# Patient Record
Sex: MALE | Race: WHITE | Employment: FULL TIME | URBAN - NONMETROPOLITAN AREA
[De-identification: names, ages, dates, MRNs, and addresses within clinical notes are randomized per-mention and may not be internally consistent; named-entity substitution may affect disease eponyms.]

---

## 2017-03-04 ENCOUNTER — OFFICE VISIT (OUTPATIENT)
Dept: FAMILY MEDICINE CLINIC | Age: 42
End: 2017-03-04
Payer: COMMERCIAL

## 2017-03-04 VITALS
OXYGEN SATURATION: 97 % | SYSTOLIC BLOOD PRESSURE: 118 MMHG | HEART RATE: 75 BPM | BODY MASS INDEX: 35.75 KG/M2 | DIASTOLIC BLOOD PRESSURE: 80 MMHG | WEIGHT: 286 LBS | TEMPERATURE: 98.2 F

## 2017-03-04 DIAGNOSIS — F41.9 ANXIETY: ICD-10-CM

## 2017-03-04 DIAGNOSIS — G47.19 EXCESSIVE DAYTIME SLEEPINESS: Primary | ICD-10-CM

## 2017-03-04 DIAGNOSIS — E55.9 VITAMIN D DEFICIENCY: ICD-10-CM

## 2017-03-04 DIAGNOSIS — I10 ESSENTIAL HYPERTENSION: ICD-10-CM

## 2017-03-04 DIAGNOSIS — E78.2 MIXED HYPERLIPIDEMIA: ICD-10-CM

## 2017-03-04 DIAGNOSIS — K21.9 GASTROESOPHAGEAL REFLUX DISEASE WITHOUT ESOPHAGITIS: ICD-10-CM

## 2017-03-04 DIAGNOSIS — E03.8 OTHER SPECIFIED HYPOTHYROIDISM: ICD-10-CM

## 2017-03-04 DIAGNOSIS — E66.3 OVERWEIGHT: ICD-10-CM

## 2017-03-04 DIAGNOSIS — Z86.39 HISTORY OF HYPOGLYCEMIA: ICD-10-CM

## 2017-03-04 DIAGNOSIS — R06.83 SNORING: ICD-10-CM

## 2017-03-04 PROCEDURE — 99214 OFFICE O/P EST MOD 30 MIN: CPT | Performed by: NURSE PRACTITIONER

## 2017-03-04 PROCEDURE — 82962 GLUCOSE BLOOD TEST: CPT | Performed by: NURSE PRACTITIONER

## 2017-03-04 RX ORDER — PANTOPRAZOLE SODIUM 40 MG/1
40 TABLET, DELAYED RELEASE ORAL
Qty: 30 TABLET | Refills: 5 | Status: SHIPPED | OUTPATIENT
Start: 2017-03-04 | End: 2017-05-08 | Stop reason: SDUPTHER

## 2017-03-04 RX ORDER — FLUOXETINE 10 MG/1
10 CAPSULE ORAL DAILY
Qty: 30 CAPSULE | Refills: 5 | Status: SHIPPED | OUTPATIENT
Start: 2017-03-04 | End: 2017-05-08 | Stop reason: SDUPTHER

## 2017-03-04 RX ORDER — AMLODIPINE BESYLATE 10 MG/1
10 TABLET ORAL DAILY
Qty: 30 TABLET | Refills: 5 | Status: SHIPPED | OUTPATIENT
Start: 2017-03-04 | End: 2017-05-08 | Stop reason: SDUPTHER

## 2017-03-04 RX ORDER — IRBESARTAN 150 MG/1
150 TABLET ORAL DAILY
Qty: 30 TABLET | Refills: 5 | Status: SHIPPED | OUTPATIENT
Start: 2017-03-04 | End: 2017-05-08 | Stop reason: SDUPTHER

## 2017-03-04 RX ORDER — ATORVASTATIN CALCIUM 40 MG/1
40 TABLET, FILM COATED ORAL NIGHTLY
Qty: 30 TABLET | Refills: 5 | Status: SHIPPED | OUTPATIENT
Start: 2017-03-04 | End: 2017-05-08 | Stop reason: SDUPTHER

## 2017-03-04 RX ORDER — LEVOTHYROXINE SODIUM 0.2 MG/1
200 TABLET ORAL DAILY
Qty: 30 TABLET | Refills: 5 | Status: SHIPPED | OUTPATIENT
Start: 2017-03-04 | End: 2017-05-08 | Stop reason: SDUPTHER

## 2017-03-04 ASSESSMENT — ENCOUNTER SYMPTOMS
VOMITING: 0
COUGH: 0
SORE THROAT: 0
NAUSEA: 0
SHORTNESS OF BREATH: 0
ABDOMINAL PAIN: 0

## 2017-05-08 DIAGNOSIS — I10 ESSENTIAL HYPERTENSION: ICD-10-CM

## 2017-05-08 DIAGNOSIS — E78.2 MIXED HYPERLIPIDEMIA: ICD-10-CM

## 2017-05-08 DIAGNOSIS — E03.8 OTHER SPECIFIED HYPOTHYROIDISM: ICD-10-CM

## 2017-05-08 DIAGNOSIS — F41.9 ANXIETY: ICD-10-CM

## 2017-05-08 DIAGNOSIS — K21.9 GASTROESOPHAGEAL REFLUX DISEASE WITHOUT ESOPHAGITIS: ICD-10-CM

## 2017-05-08 RX ORDER — FLUOXETINE 10 MG/1
10 CAPSULE ORAL DAILY
Qty: 90 CAPSULE | Refills: 0 | Status: SHIPPED | OUTPATIENT
Start: 2017-05-08 | End: 2017-07-20 | Stop reason: SDUPTHER

## 2017-05-08 RX ORDER — ATORVASTATIN CALCIUM 40 MG/1
40 TABLET, FILM COATED ORAL NIGHTLY
Qty: 90 TABLET | Refills: 0 | Status: SHIPPED | OUTPATIENT
Start: 2017-05-08 | End: 2017-07-20 | Stop reason: SDUPTHER

## 2017-05-08 RX ORDER — LEVOTHYROXINE SODIUM 0.2 MG/1
200 TABLET ORAL DAILY
Qty: 90 TABLET | Refills: 0 | Status: SHIPPED | OUTPATIENT
Start: 2017-05-08 | End: 2017-07-20 | Stop reason: SDUPTHER

## 2017-05-08 RX ORDER — IRBESARTAN 150 MG/1
150 TABLET ORAL DAILY
Qty: 90 TABLET | Refills: 0 | Status: SHIPPED | OUTPATIENT
Start: 2017-05-08 | End: 2017-06-04

## 2017-05-08 RX ORDER — PANTOPRAZOLE SODIUM 40 MG/1
40 TABLET, DELAYED RELEASE ORAL
Qty: 90 TABLET | Refills: 0 | Status: SHIPPED | OUTPATIENT
Start: 2017-05-08 | End: 2017-06-09 | Stop reason: SDUPTHER

## 2017-05-08 RX ORDER — AMLODIPINE BESYLATE 10 MG/1
10 TABLET ORAL DAILY
Qty: 90 TABLET | Refills: 0 | Status: SHIPPED | OUTPATIENT
Start: 2017-05-08 | End: 2017-07-20 | Stop reason: SDUPTHER

## 2017-06-04 ENCOUNTER — APPOINTMENT (OUTPATIENT)
Dept: GENERAL RADIOLOGY | Age: 42
End: 2017-06-04
Payer: COMMERCIAL

## 2017-06-04 ENCOUNTER — HOSPITAL ENCOUNTER (EMERGENCY)
Age: 42
Discharge: HOME | End: 2017-06-04
Attending: EMERGENCY MEDICINE
Payer: COMMERCIAL

## 2017-06-04 VITALS
DIASTOLIC BLOOD PRESSURE: 78 MMHG | WEIGHT: 280 LBS | HEIGHT: 75 IN | HEART RATE: 74 BPM | TEMPERATURE: 98.6 F | SYSTOLIC BLOOD PRESSURE: 124 MMHG | BODY MASS INDEX: 34.82 KG/M2 | OXYGEN SATURATION: 98 % | RESPIRATION RATE: 18 BRPM

## 2017-06-04 DIAGNOSIS — R07.9 CHEST PAIN, UNSPECIFIED TYPE: Primary | ICD-10-CM

## 2017-06-04 LAB
ALBUMIN SERPL-MCNC: 4.6 G/DL (ref 3.5–5.2)
ALP BLD-CCNC: 81 U/L (ref 40–130)
ALT SERPL-CCNC: 51 U/L (ref 5–41)
ANION GAP SERPL CALCULATED.3IONS-SCNC: 12 MMOL/L (ref 7–19)
AST SERPL-CCNC: 23 U/L (ref 5–40)
BASOPHILS ABSOLUTE: 0.1 K/UL (ref 0–0.2)
BASOPHILS RELATIVE PERCENT: 1.2 % (ref 0–1)
BILIRUB SERPL-MCNC: 0.8 MG/DL (ref 0.2–1.2)
BUN BLDV-MCNC: 9 MG/DL (ref 6–20)
CALCIUM SERPL-MCNC: 9.3 MG/DL (ref 8.6–10)
CHLORIDE BLD-SCNC: 101 MMOL/L (ref 98–111)
CO2: 25 MMOL/L (ref 22–29)
CREAT SERPL-MCNC: 0.8 MG/DL (ref 0.5–1.2)
EOSINOPHILS ABSOLUTE: 0.3 K/UL (ref 0–0.6)
EOSINOPHILS RELATIVE PERCENT: 3.1 % (ref 0–5)
GFR NON-AFRICAN AMERICAN: >60
GLUCOSE BLD-MCNC: 101 MG/DL (ref 74–109)
HCT VFR BLD CALC: 45.5 % (ref 42–52)
HEMOGLOBIN: 15.6 G/DL (ref 14–18)
INR BLD: 0.93 (ref 0.88–1.18)
LIPASE: 19 U/L (ref 13–60)
LYMPHOCYTES ABSOLUTE: 2.7 K/UL (ref 1.1–4.5)
LYMPHOCYTES RELATIVE PERCENT: 32.1 % (ref 20–40)
MCH RBC QN AUTO: 30.8 PG (ref 27–31)
MCHC RBC AUTO-ENTMCNC: 34.3 G/DL (ref 33–37)
MCV RBC AUTO: 89.7 FL (ref 80–94)
MONOCYTES ABSOLUTE: 1.2 K/UL (ref 0–0.9)
MONOCYTES RELATIVE PERCENT: 14.3 % (ref 0–10)
NEUTROPHILS ABSOLUTE: 4 K/UL (ref 1.5–7.5)
NEUTROPHILS RELATIVE PERCENT: 48.9 % (ref 50–65)
PDW BLD-RTO: 12.9 % (ref 11.5–14.5)
PERFORMED ON: NORMAL
PERFORMED ON: NORMAL
PLATELET # BLD: 357 K/UL (ref 130–400)
PMV BLD AUTO: 9.4 FL (ref 9.4–12.4)
POC TROPONIN I: 0 NG/ML (ref 0–0.08)
POC TROPONIN I: 0 NG/ML (ref 0–0.08)
POTASSIUM SERPL-SCNC: 4 MMOL/L (ref 3.5–5)
PRO-BNP: 50 PG/ML (ref 0–450)
PROTHROMBIN TIME: 12.4 SEC (ref 12–14.6)
RBC # BLD: 5.07 M/UL (ref 4.7–6.1)
SODIUM BLD-SCNC: 138 MMOL/L (ref 136–145)
TOTAL PROTEIN: 7.5 G/DL (ref 6.6–8.7)
WBC # BLD: 8.3 K/UL (ref 4.8–10.8)

## 2017-06-04 PROCEDURE — 99285 EMERGENCY DEPT VISIT HI MDM: CPT

## 2017-06-04 PROCEDURE — 6370000000 HC RX 637 (ALT 250 FOR IP): Performed by: EMERGENCY MEDICINE

## 2017-06-04 PROCEDURE — 99283 EMERGENCY DEPT VISIT LOW MDM: CPT | Performed by: EMERGENCY MEDICINE

## 2017-06-04 PROCEDURE — 85610 PROTHROMBIN TIME: CPT

## 2017-06-04 PROCEDURE — 84484 ASSAY OF TROPONIN QUANT: CPT

## 2017-06-04 PROCEDURE — 80053 COMPREHEN METABOLIC PANEL: CPT

## 2017-06-04 PROCEDURE — 83690 ASSAY OF LIPASE: CPT

## 2017-06-04 PROCEDURE — 71020 XR CHEST STANDARD TWO VW: CPT

## 2017-06-04 PROCEDURE — 85025 COMPLETE CBC W/AUTO DIFF WBC: CPT

## 2017-06-04 PROCEDURE — 93005 ELECTROCARDIOGRAM TRACING: CPT

## 2017-06-04 PROCEDURE — 83880 ASSAY OF NATRIURETIC PEPTIDE: CPT

## 2017-06-04 PROCEDURE — 36415 COLL VENOUS BLD VENIPUNCTURE: CPT

## 2017-06-04 RX ORDER — LISINOPRIL 10 MG/1
10 TABLET ORAL DAILY
COMMUNITY
End: 2017-11-28 | Stop reason: ALTCHOICE

## 2017-06-04 RX ADMIN — ASPIRIN 325 MG: 325 TABLET, DELAYED RELEASE ORAL at 12:25

## 2017-06-04 ASSESSMENT — PAIN DESCRIPTION - LOCATION: LOCATION: CHEST

## 2017-06-04 ASSESSMENT — ENCOUNTER SYMPTOMS
ABDOMINAL PAIN: 0
NAUSEA: 1
BACK PAIN: 0
SHORTNESS OF BREATH: 0
VOMITING: 0

## 2017-06-04 ASSESSMENT — PAIN DESCRIPTION - DESCRIPTORS: DESCRIPTORS: PRESSURE

## 2017-06-04 ASSESSMENT — PAIN SCALES - GENERAL: PAINLEVEL_OUTOF10: 5

## 2017-06-04 ASSESSMENT — PAIN DESCRIPTION - FREQUENCY: FREQUENCY: INTERMITTENT

## 2017-06-04 ASSESSMENT — PAIN DESCRIPTION - PAIN TYPE: TYPE: ACUTE PAIN

## 2017-06-05 ENCOUNTER — TELEPHONE (OUTPATIENT)
Dept: FAMILY MEDICINE CLINIC | Age: 42
End: 2017-06-05

## 2017-06-06 LAB
EKG P AXIS: 15 DEGREES
EKG P AXIS: 50 DEGREES
EKG P-R INTERVAL: 124 MS
EKG P-R INTERVAL: 144 MS
EKG Q-T INTERVAL: 356 MS
EKG Q-T INTERVAL: 396 MS
EKG QRS DURATION: 108 MS
EKG QRS DURATION: 110 MS
EKG QTC CALCULATION (BAZETT): 391 MS
EKG QTC CALCULATION (BAZETT): 403 MS
EKG T AXIS: 10 DEGREES
EKG T AXIS: 16 DEGREES

## 2017-06-09 ENCOUNTER — OFFICE VISIT (OUTPATIENT)
Dept: FAMILY MEDICINE CLINIC | Age: 42
End: 2017-06-09
Payer: COMMERCIAL

## 2017-06-09 VITALS
TEMPERATURE: 99 F | WEIGHT: 288 LBS | DIASTOLIC BLOOD PRESSURE: 68 MMHG | BODY MASS INDEX: 36 KG/M2 | SYSTOLIC BLOOD PRESSURE: 110 MMHG | OXYGEN SATURATION: 98 % | HEART RATE: 81 BPM | RESPIRATION RATE: 16 BRPM

## 2017-06-09 DIAGNOSIS — R07.9 CHEST PAIN, UNSPECIFIED TYPE: Primary | ICD-10-CM

## 2017-06-09 DIAGNOSIS — K21.9 GASTROESOPHAGEAL REFLUX DISEASE WITHOUT ESOPHAGITIS: ICD-10-CM

## 2017-06-09 PROCEDURE — 99214 OFFICE O/P EST MOD 30 MIN: CPT | Performed by: NURSE PRACTITIONER

## 2017-06-09 RX ORDER — PANTOPRAZOLE SODIUM 40 MG/1
TABLET, DELAYED RELEASE ORAL
Qty: 180 TABLET | Refills: 0
Start: 2017-06-09 | End: 2018-09-21

## 2017-06-09 ASSESSMENT — ENCOUNTER SYMPTOMS
SHORTNESS OF BREATH: 0
COUGH: 0

## 2017-06-14 ENCOUNTER — HOSPITAL ENCOUNTER (OUTPATIENT)
Dept: NON INVASIVE DIAGNOSTICS | Age: 42
Discharge: HOME | End: 2017-06-14
Payer: COMMERCIAL

## 2017-06-20 ENCOUNTER — HOSPITAL ENCOUNTER (OUTPATIENT)
Dept: NON INVASIVE DIAGNOSTICS | Age: 42
Discharge: HOME | End: 2017-06-20
Payer: COMMERCIAL

## 2017-06-20 DIAGNOSIS — R07.9 CHEST PAIN, UNSPECIFIED TYPE: ICD-10-CM

## 2017-06-20 LAB
LV EF: 50 %
LVEF MODALITY: NORMAL

## 2017-06-20 PROCEDURE — 93350 STRESS TTE ONLY: CPT

## 2017-07-21 DIAGNOSIS — E03.8 OTHER SPECIFIED HYPOTHYROIDISM: ICD-10-CM

## 2017-07-21 DIAGNOSIS — I10 ESSENTIAL HYPERTENSION: ICD-10-CM

## 2017-07-21 DIAGNOSIS — K21.9 GASTROESOPHAGEAL REFLUX DISEASE WITHOUT ESOPHAGITIS: ICD-10-CM

## 2017-07-21 DIAGNOSIS — E78.2 MIXED HYPERLIPIDEMIA: ICD-10-CM

## 2017-07-21 DIAGNOSIS — F41.9 ANXIETY: ICD-10-CM

## 2017-07-23 RX ORDER — LEVOTHYROXINE SODIUM 0.2 MG/1
TABLET ORAL
Qty: 90 TABLET | Refills: 0 | Status: SHIPPED | OUTPATIENT
Start: 2017-07-23 | End: 2017-11-28 | Stop reason: SDUPTHER

## 2017-07-23 RX ORDER — ATORVASTATIN CALCIUM 40 MG/1
TABLET, FILM COATED ORAL
Qty: 90 TABLET | Refills: 0 | Status: SHIPPED | OUTPATIENT
Start: 2017-07-23 | End: 2017-11-28 | Stop reason: SDUPTHER

## 2017-07-23 RX ORDER — IRBESARTAN 150 MG/1
TABLET ORAL
Qty: 90 TABLET | Refills: 0 | Status: SHIPPED | OUTPATIENT
Start: 2017-07-23 | End: 2017-11-28 | Stop reason: SDUPTHER

## 2017-07-23 RX ORDER — AMLODIPINE BESYLATE 10 MG/1
TABLET ORAL
Qty: 90 TABLET | Refills: 0 | Status: SHIPPED | OUTPATIENT
Start: 2017-07-23 | End: 2017-11-28 | Stop reason: SDUPTHER

## 2017-07-23 RX ORDER — FLUOXETINE 10 MG/1
CAPSULE ORAL
Qty: 90 CAPSULE | Refills: 0 | Status: SHIPPED | OUTPATIENT
Start: 2017-07-23 | End: 2017-11-28 | Stop reason: SDUPTHER

## 2017-07-23 RX ORDER — PANTOPRAZOLE SODIUM 40 MG/1
TABLET, DELAYED RELEASE ORAL
Qty: 90 TABLET | Refills: 0 | Status: SHIPPED | OUTPATIENT
Start: 2017-07-23 | End: 2017-11-28 | Stop reason: SDUPTHER

## 2017-11-28 DIAGNOSIS — F41.9 ANXIETY: ICD-10-CM

## 2017-11-28 DIAGNOSIS — I10 ESSENTIAL HYPERTENSION: ICD-10-CM

## 2017-11-28 DIAGNOSIS — E03.8 OTHER SPECIFIED HYPOTHYROIDISM: ICD-10-CM

## 2017-11-28 DIAGNOSIS — E78.2 MIXED HYPERLIPIDEMIA: ICD-10-CM

## 2017-11-28 DIAGNOSIS — K21.9 GASTROESOPHAGEAL REFLUX DISEASE WITHOUT ESOPHAGITIS: ICD-10-CM

## 2017-11-28 RX ORDER — AMLODIPINE BESYLATE 10 MG/1
10 TABLET ORAL DAILY
Qty: 30 TABLET | Refills: 0 | Status: SHIPPED | OUTPATIENT
Start: 2017-11-28 | End: 2017-12-27 | Stop reason: SDUPTHER

## 2017-11-28 RX ORDER — IRBESARTAN 150 MG/1
150 TABLET ORAL DAILY
Qty: 30 TABLET | Refills: 0 | Status: SHIPPED | OUTPATIENT
Start: 2017-11-28 | End: 2017-12-27 | Stop reason: SDUPTHER

## 2017-11-28 RX ORDER — ATORVASTATIN CALCIUM 40 MG/1
40 TABLET, FILM COATED ORAL NIGHTLY
Qty: 30 TABLET | Refills: 0 | Status: SHIPPED | OUTPATIENT
Start: 2017-11-28 | End: 2017-12-27 | Stop reason: SDUPTHER

## 2017-11-28 RX ORDER — PANTOPRAZOLE SODIUM 40 MG/1
40 TABLET, DELAYED RELEASE ORAL DAILY
Qty: 30 TABLET | Refills: 0 | Status: SHIPPED | OUTPATIENT
Start: 2017-11-28 | End: 2017-12-27 | Stop reason: SDUPTHER

## 2017-11-28 RX ORDER — LEVOTHYROXINE SODIUM 0.2 MG/1
200 TABLET ORAL DAILY
Qty: 30 TABLET | Refills: 0 | Status: SHIPPED | OUTPATIENT
Start: 2017-11-28 | End: 2017-12-27 | Stop reason: SDUPTHER

## 2017-11-28 RX ORDER — FLUOXETINE 10 MG/1
10 CAPSULE ORAL DAILY
Qty: 30 CAPSULE | Refills: 0 | Status: SHIPPED | OUTPATIENT
Start: 2017-11-28 | End: 2017-12-27 | Stop reason: SDUPTHER

## 2017-12-12 RX ORDER — ERGOCALCIFEROL (VITAMIN D2) 1250 MCG
50000 CAPSULE ORAL WEEKLY
Qty: 4 CAPSULE | Refills: 1 | Status: SHIPPED | OUTPATIENT
Start: 2017-12-12 | End: 2018-09-21

## 2017-12-27 ENCOUNTER — OFFICE VISIT (OUTPATIENT)
Dept: FAMILY MEDICINE CLINIC | Age: 42
End: 2017-12-27
Payer: COMMERCIAL

## 2017-12-27 VITALS
OXYGEN SATURATION: 97 % | SYSTOLIC BLOOD PRESSURE: 136 MMHG | BODY MASS INDEX: 37.5 KG/M2 | HEART RATE: 76 BPM | RESPIRATION RATE: 18 BRPM | WEIGHT: 300 LBS | TEMPERATURE: 97.8 F | DIASTOLIC BLOOD PRESSURE: 84 MMHG

## 2017-12-27 DIAGNOSIS — E78.2 MIXED HYPERLIPIDEMIA: ICD-10-CM

## 2017-12-27 DIAGNOSIS — K21.9 GASTROESOPHAGEAL REFLUX DISEASE WITHOUT ESOPHAGITIS: Primary | ICD-10-CM

## 2017-12-27 DIAGNOSIS — E03.8 OTHER SPECIFIED HYPOTHYROIDISM: ICD-10-CM

## 2017-12-27 DIAGNOSIS — F41.9 ANXIETY: ICD-10-CM

## 2017-12-27 DIAGNOSIS — E55.9 VITAMIN D DEFICIENCY: ICD-10-CM

## 2017-12-27 DIAGNOSIS — I10 ESSENTIAL HYPERTENSION: ICD-10-CM

## 2017-12-27 PROCEDURE — 99213 OFFICE O/P EST LOW 20 MIN: CPT | Performed by: NURSE PRACTITIONER

## 2017-12-27 RX ORDER — ESOMEPRAZOLE MAGNESIUM 40 MG/1
40 CAPSULE, DELAYED RELEASE ORAL
Qty: 30 CAPSULE | Refills: 2 | Status: SHIPPED | OUTPATIENT
Start: 2017-12-27 | End: 2018-01-08 | Stop reason: SDUPTHER

## 2017-12-27 RX ORDER — AMLODIPINE BESYLATE 10 MG/1
10 TABLET ORAL DAILY
Qty: 90 TABLET | Refills: 1 | Status: SHIPPED | OUTPATIENT
Start: 2017-12-27 | End: 2018-06-24 | Stop reason: SDUPTHER

## 2017-12-27 RX ORDER — IRBESARTAN 150 MG/1
150 TABLET ORAL DAILY
Qty: 90 TABLET | Refills: 1 | Status: SHIPPED | OUTPATIENT
Start: 2017-12-27 | End: 2018-06-24 | Stop reason: SDUPTHER

## 2017-12-27 RX ORDER — FLUOXETINE 10 MG/1
10 CAPSULE ORAL DAILY
Qty: 90 CAPSULE | Refills: 1 | Status: SHIPPED | OUTPATIENT
Start: 2017-12-27 | End: 2018-06-24 | Stop reason: SDUPTHER

## 2017-12-27 RX ORDER — LEVOTHYROXINE SODIUM 0.2 MG/1
200 TABLET ORAL DAILY
Qty: 90 TABLET | Refills: 1 | Status: SHIPPED | OUTPATIENT
Start: 2017-12-27 | End: 2018-06-24 | Stop reason: SDUPTHER

## 2017-12-27 RX ORDER — ATORVASTATIN CALCIUM 40 MG/1
40 TABLET, FILM COATED ORAL NIGHTLY
Qty: 90 TABLET | Refills: 1 | Status: SHIPPED | OUTPATIENT
Start: 2017-12-27 | End: 2018-06-24 | Stop reason: SDUPTHER

## 2017-12-27 RX ORDER — PANTOPRAZOLE SODIUM 40 MG/1
40 TABLET, DELAYED RELEASE ORAL DAILY
Qty: 90 TABLET | Refills: 1 | Status: CANCELLED | OUTPATIENT
Start: 2017-12-27

## 2017-12-27 NOTE — PROGRESS NOTES
Allergies    Objective:   Physical Exam   Constitutional: He is oriented to person, place, and time. He appears well-developed and well-nourished. No distress. overweight   HENT:   Head: Normocephalic and atraumatic. Right Ear: External ear normal.   Left Ear: External ear normal.   Nose: Nose normal.   Mouth/Throat: Oropharynx is clear and moist. No oropharyngeal exudate. Eyes: Conjunctivae and EOM are normal. Pupils are equal, round, and reactive to light. Neck: Normal range of motion. Neck supple. No tracheal deviation present. No thyromegaly present. Cardiovascular: Normal rate, regular rhythm and normal heart sounds. Pulmonary/Chest: Effort normal and breath sounds normal. No respiratory distress. Abdominal: Soft. Bowel sounds are normal. There is no tenderness. Musculoskeletal: He exhibits no edema. Lymphadenopathy:     He has no cervical adenopathy. Neurological: He is alert and oriented to person, place, and time. Skin: Skin is warm and dry. He is not diaphoretic. Psychiatric: He has a normal mood and affect. His behavior is normal.   Nursing note and vitals reviewed. /84 (Site: Right Arm, Position: Sitting, Cuff Size: Small Adult)   Pulse 76   Temp 97.8 °F (36.6 °C) (Temporal)   Resp 18   Wt 300 lb (136.1 kg)   SpO2 97%   BMI 37.50 kg/m²     Assessment:      1. Gastroesophageal reflux disease without esophagitis  External Referral To Gastroenterology   2. Other specified hypothyroidism  levothyroxine (SYNTHROID) 200 MCG tablet   3. Mixed hyperlipidemia  atorvastatin (LIPITOR) 40 MG tablet   4. Anxiety  FLUoxetine (PROZAC) 10 MG capsule   5. Essential hypertension  amLODIPine (NORVASC) 10 MG tablet    irbesartan (AVAPRO) 150 MG tablet   6. Vitamin D deficiency  Vitamin D 25 Hydroxy           Plan:    Lab from early December discussed/addressed. Change of PPI as above.     Referral to GI as discussed and ordered  Plan as above

## 2017-12-30 ASSESSMENT — ENCOUNTER SYMPTOMS
SHORTNESS OF BREATH: 0
COUGH: 0
ABDOMINAL PAIN: 0
TROUBLE SWALLOWING: 0

## 2018-01-05 RX ORDER — LEVOTHYROXINE SODIUM 112 UG/1
112 TABLET ORAL DAILY
COMMUNITY

## 2018-01-05 RX ORDER — PANTOPRAZOLE SODIUM 40 MG/1
40 TABLET, DELAYED RELEASE ORAL DAILY
COMMUNITY

## 2018-01-05 RX ORDER — ATORVASTATIN CALCIUM 10 MG/1
10 TABLET, FILM COATED ORAL DAILY
COMMUNITY

## 2018-01-08 RX ORDER — ESOMEPRAZOLE MAGNESIUM 40 MG/1
40 CAPSULE, DELAYED RELEASE ORAL
Qty: 30 CAPSULE | Refills: 2 | Status: SHIPPED | OUTPATIENT
Start: 2018-01-08 | End: 2021-06-14 | Stop reason: SDUPTHER

## 2018-01-25 ENCOUNTER — OFFICE VISIT (OUTPATIENT)
Dept: GASTROENTEROLOGY | Facility: CLINIC | Age: 43
End: 2018-01-25

## 2018-01-25 VITALS
DIASTOLIC BLOOD PRESSURE: 84 MMHG | TEMPERATURE: 97.1 F | OXYGEN SATURATION: 98 % | HEART RATE: 82 BPM | HEIGHT: 75 IN | WEIGHT: 294 LBS | SYSTOLIC BLOOD PRESSURE: 144 MMHG | BODY MASS INDEX: 36.56 KG/M2

## 2018-01-25 DIAGNOSIS — K21.9 GASTROESOPHAGEAL REFLUX DISEASE, ESOPHAGITIS PRESENCE NOT SPECIFIED: Primary | ICD-10-CM

## 2018-01-25 DIAGNOSIS — R09.89 GLOBUS SENSATION: ICD-10-CM

## 2018-01-25 PROCEDURE — 99214 OFFICE O/P EST MOD 30 MIN: CPT | Performed by: NURSE PRACTITIONER

## 2018-01-25 RX ORDER — IRBESARTAN 150 MG/1
150 TABLET ORAL NIGHTLY
COMMUNITY

## 2018-01-25 RX ORDER — FLUOXETINE 10 MG/1
10 CAPSULE ORAL DAILY
COMMUNITY

## 2018-01-25 RX ORDER — AMLODIPINE BESYLATE 10 MG/1
10 TABLET ORAL DAILY
COMMUNITY

## 2018-01-25 NOTE — PROGRESS NOTES
"Chief Complaint   Patient presents with   • GI Problem     was having acid reflux was changed to nexium doing better had endo 1-2-13       Subjective     HPI     Increased acid reflux despite use of daily Protonix.  Acid reflux worse in afternoon.  No nausea or dysphagia.  No abdominal pain. Protonix changed to Nexium OTC (takes 40 mg) in Dec 2017.  He reports improvement in symptoms with change of PPI over past 2 weeks.   He continues to have frequent clearing of throat due to globus sensation.  No changes in bowels, no BRBPR or melan stools.   Consumes \"alot\" of caffeine daily.    Endoscopy (Dr Horvath) 2013 esophagitis  CScope (Dr Horvath) 2010 for eval of diarrhea, procedure normal      Past Medical History:   Diagnosis Date   • Anxiety    • Hyperlipidemia    • Hypertension        Past Surgical History:   Procedure Laterality Date   • APPENDECTOMY     • CHOLECYSTECTOMY     • COLONOSCOPY  08/06/2010    normal   • ENDOSCOPY  01/02/2013    mild gastritis/esophagitis       Outpatient Prescriptions Marked as Taking for the 1/25/18 encounter (Office Visit) with JOSEPH Gtz   Medication Sig Dispense Refill   • amLODIPine (NORVASC) 10 MG tablet Take 10 mg by mouth Daily.     • atorvastatin (LIPITOR) 10 MG tablet Take 10 mg by mouth Daily.     • esomeprazole (nexIUM) 20 MG capsule Take 20 mg by mouth 2 (Two) Times a Day.     • FLUoxetine (PROzac) 10 MG capsule Take 10 mg by mouth Daily.     • irbesartan (AVAPRO) 150 MG tablet Take 150 mg by mouth Every Night.     • levothyroxine (SYNTHROID, LEVOTHROID) 112 MCG tablet Take 112 mcg by mouth Daily.         No Known Allergies    Social History     Social History   • Marital status:      Spouse name: N/A   • Number of children: N/A   • Years of education: N/A     Occupational History   • Not on file.     Social History Main Topics   • Smoking status: Former Smoker   • Smokeless tobacco: Never Used   • Alcohol use Yes      Comment: rare   • Drug use: No   • " "Sexual activity: Not on file     Other Topics Concern   • Not on file     Social History Narrative       Family History   Problem Relation Age of Onset   • Elizabeth's esophagus Mother        Review of Systems   Constitutional: Negative for fatigue, fever and unexpected weight change.   HENT: Negative for hearing loss, sore throat and voice change.    Eyes: Negative for visual disturbance.   Respiratory: Negative for cough, shortness of breath and wheezing.    Cardiovascular: Negative for chest pain and palpitations.   Gastrointestinal: Negative for abdominal pain, blood in stool and vomiting.   Endocrine: Negative for polydipsia and polyuria.   Genitourinary: Negative for difficulty urinating, dysuria, hematuria and urgency.   Musculoskeletal: Negative for joint swelling and myalgias.   Skin: Negative for color change, rash and wound.   Neurological: Negative for dizziness, tremors, seizures and syncope.   Hematological: Does not bruise/bleed easily.   Psychiatric/Behavioral: Negative for agitation and confusion. The patient is not nervous/anxious.        Objective     Vitals:    01/25/18 0922   BP: 144/84   Pulse: 82   Temp: 97.1 °F (36.2 °C)   SpO2: 98%   Weight: 133 kg (294 lb)   Height: 190.5 cm (75\")     Body mass index is 36.75 kg/(m^2).    Physical Exam   Constitutional: He is oriented to person, place, and time. He appears well-developed and well-nourished.   HENT:   Head: Normocephalic and atraumatic.   Eyes:   Pink, Nonicteric   Neck:   Global Assessment- supple. No JVD or lymphadenopathy   Cardiovascular: Normal rate, regular rhythm and normal heart sounds.  Exam reveals no gallop and no friction rub.    No murmur heard.  Pulmonary/Chest: Effort normal and breath sounds normal. No respiratory distress. He has no wheezes. He has no rales.   Inspection: Movements-Symmetrical   Abdominal: Soft. Bowel sounds are normal. He exhibits no distension and no mass. There is no tenderness. There is no rebound and no " guarding.   Neurological: He is alert and oriented to person, place, and time.   General Exam-Deemed a reliable historian, able to converse without difficulty and Able to move all extremities without difficulty       Imaging Results (most recent)     None          Assessment/Plan     Be was seen today for gi problem.    Diagnoses and all orders for this visit:    Gastroesophageal reflux disease, esophagitis presence not specified  -     Case Request; Standing  -     Implement Anesthesia Orders Day of Procedure; Standing  -     Obtain Informed Consent; Standing  -     Case Request    Globus sensation      ESOPHAGOGASTRODUODENOSCOPY WITH ANESTHESIA (N/A)    Decrease caffeine intake  Cont OTC Nexium 2 in the morning 30 min prior to breakfast    Patient is to continue all blood pressure and cardiac medications prior to procedure.     Patient's BMI is above normal parameters. Follow-up plan includes:  no follow-up required     The risk of the endoscopy were discussed in detail.  We discussed the risk of perforation (one out of 5363-8167, riskier with dilation), bleeding (one out of 500), and the rare risks of infection, adverse reaction to anesthesia, respiratory failure, cardiac failure including MI and adverse reaction to medications, etc.  We discussed consequences that could occur if a risk were to develop such as the need for hospitalization, blood transfusion, surgical intervention, medications, pain and disability and death.  Alternatives include not doing anything, or pursuing an UGI series which only offers a diagnosis with potential less accuracy compared to egd.  The patient verbalizes understanding and agrees to proceed.      .    Patient Instructions   Gastroesophageal Reflux Disease, Adult    Gastroesophageal reflux disease (GERD) happens when acid from your stomach flows up into the esophagus. When acid comes in contact with the esophagus, the acid causes soreness (inflammation) in the esophagus. Over  time, GERD may create small holes (ulcers) in the lining of the esophagus.  CAUSES    · Increased body weight. This puts pressure on the stomach, making acid rise from the stomach into the esophagus.  · Smoking. This increases acid production in the stomach.  · Drinking alcohol. This causes decreased pressure in the lower esophageal sphincter (valve or ring of muscle between the esophagus and stomach), allowing acid from the stomach into the esophagus.  · Late evening meals and a full stomach. This increases pressure and acid production in the stomach.  · A malformed lower esophageal sphincter.  Sometimes, no cause is found.  SYMPTOMS    · Burning pain in the lower part of the mid-chest behind the breastbone and in the mid-stomach area. This may occur twice a week or more often.  · Trouble swallowing.  · Sore throat.  · Dry cough.  · Asthma-like symptoms including chest tightness, shortness of breath, or wheezing.  DIAGNOSIS    Your caregiver may be able to diagnose GERD based on your symptoms. In some cases, X-rays and other tests may be done to check for complications or to check the condition of your stomach and esophagus.  TREATMENT    Your caregiver may recommend over-the-counter or prescription medicines to help decrease acid production. Ask your caregiver before starting or adding any new medicines.    HOME CARE INSTRUCTIONS    · Change the factors that you can control. Ask your caregiver for guidance concerning weight loss, quitting smoking, and alcohol consumption.  · Avoid foods and drinks that make your symptoms worse, such as:  ¨ Caffeine or alcoholic drinks.  ¨ Chocolate.  ¨ Peppermint or mint flavorings.  ¨ Garlic and onions.  ¨ Spicy foods.  ¨ Citrus fruits, such as oranges, constantino, or limes.  ¨ Tomato-based foods such as sauce, chili, salsa, and pizza.  ¨ Fried and fatty foods.  · Avoid lying down for the 3 hours prior to your bedtime or prior to taking a nap.  · Eat small, frequent meals instead of  large meals.  · Wear loose-fitting clothing. Do not wear anything tight around your waist that causes pressure on your stomach.  · Raise the head of your bed 6 to 8 inches with wood blocks to help you sleep. Extra pillows will not help.  · Only take over-the-counter or prescription medicines for pain, discomfort, or fever as directed by your caregiver.  · Do not take aspirin, ibuprofen, or other nonsteroidal anti-inflammatory drugs (NSAIDs).  SEEK IMMEDIATE MEDICAL CARE IF:    · You have pain in your arms, neck, jaw, teeth, or back.  · Your pain increases or changes in intensity or duration.  · You develop nausea, vomiting, or sweating (diaphoresis).  · You develop shortness of breath, or you faint.  · Your vomit is green, yellow, black, or looks like coffee grounds or blood.  · Your stool is red, bloody, or black.  These symptoms could be signs of other problems, such as heart disease, gastric bleeding, or esophageal bleeding.  MAKE SURE YOU:    · Understand these instructions.  · Will watch your condition.  · Will get help right away if you are not doing well or get worse.     This information is not intended to replace advice given to you by your health care provider. Make sure you discuss any questions you have with your health care provider.     Document Released: 09/27/2006 Document Revised: 01/08/2016 Document Reviewed: 04/13/2016  [employer] Interactive Patient Education ©2016 [employer]

## 2018-01-25 NOTE — PATIENT INSTRUCTIONS
Gastroesophageal Reflux Disease, Adult    Gastroesophageal reflux disease (GERD) happens when acid from your stomach flows up into the esophagus. When acid comes in contact with the esophagus, the acid causes soreness (inflammation) in the esophagus. Over time, GERD may create small holes (ulcers) in the lining of the esophagus.  CAUSES    · Increased body weight. This puts pressure on the stomach, making acid rise from the stomach into the esophagus.  · Smoking. This increases acid production in the stomach.  · Drinking alcohol. This causes decreased pressure in the lower esophageal sphincter (valve or ring of muscle between the esophagus and stomach), allowing acid from the stomach into the esophagus.  · Late evening meals and a full stomach. This increases pressure and acid production in the stomach.  · A malformed lower esophageal sphincter.  Sometimes, no cause is found.  SYMPTOMS    · Burning pain in the lower part of the mid-chest behind the breastbone and in the mid-stomach area. This may occur twice a week or more often.  · Trouble swallowing.  · Sore throat.  · Dry cough.  · Asthma-like symptoms including chest tightness, shortness of breath, or wheezing.  DIAGNOSIS    Your caregiver may be able to diagnose GERD based on your symptoms. In some cases, X-rays and other tests may be done to check for complications or to check the condition of your stomach and esophagus.  TREATMENT    Your caregiver may recommend over-the-counter or prescription medicines to help decrease acid production. Ask your caregiver before starting or adding any new medicines.    HOME CARE INSTRUCTIONS    · Change the factors that you can control. Ask your caregiver for guidance concerning weight loss, quitting smoking, and alcohol consumption.  · Avoid foods and drinks that make your symptoms worse, such as:  ¨ Caffeine or alcoholic drinks.  ¨ Chocolate.  ¨ Peppermint or mint flavorings.  ¨ Garlic and onions.  ¨ Spicy foods.  ¨ Citrus  fruits, such as oranges, constantino, or limes.  ¨ Tomato-based foods such as sauce, chili, salsa, and pizza.  ¨ Fried and fatty foods.  · Avoid lying down for the 3 hours prior to your bedtime or prior to taking a nap.  · Eat small, frequent meals instead of large meals.  · Wear loose-fitting clothing. Do not wear anything tight around your waist that causes pressure on your stomach.  · Raise the head of your bed 6 to 8 inches with wood blocks to help you sleep. Extra pillows will not help.  · Only take over-the-counter or prescription medicines for pain, discomfort, or fever as directed by your caregiver.  · Do not take aspirin, ibuprofen, or other nonsteroidal anti-inflammatory drugs (NSAIDs).  SEEK IMMEDIATE MEDICAL CARE IF:    · You have pain in your arms, neck, jaw, teeth, or back.  · Your pain increases or changes in intensity or duration.  · You develop nausea, vomiting, or sweating (diaphoresis).  · You develop shortness of breath, or you faint.  · Your vomit is green, yellow, black, or looks like coffee grounds or blood.  · Your stool is red, bloody, or black.  These symptoms could be signs of other problems, such as heart disease, gastric bleeding, or esophageal bleeding.  MAKE SURE YOU:    · Understand these instructions.  · Will watch your condition.  · Will get help right away if you are not doing well or get worse.     This information is not intended to replace advice given to you by your health care provider. Make sure you discuss any questions you have with your health care provider.     Document Released: 09/27/2006 Document Revised: 01/08/2016 Document Reviewed: 04/13/2016  [employer] Interactive Patient Education ©2016 [employer]

## 2018-02-13 ENCOUNTER — ANESTHESIA EVENT (OUTPATIENT)
Dept: GASTROENTEROLOGY | Facility: HOSPITAL | Age: 43
End: 2018-02-13

## 2018-02-13 ENCOUNTER — ANESTHESIA (OUTPATIENT)
Dept: GASTROENTEROLOGY | Facility: HOSPITAL | Age: 43
End: 2018-02-13

## 2018-02-13 ENCOUNTER — HOSPITAL ENCOUNTER (OUTPATIENT)
Facility: HOSPITAL | Age: 43
Setting detail: HOSPITAL OUTPATIENT SURGERY
Discharge: HOME | End: 2018-02-13
Attending: INTERNAL MEDICINE | Admitting: INTERNAL MEDICINE

## 2018-02-13 VITALS
HEART RATE: 69 BPM | SYSTOLIC BLOOD PRESSURE: 130 MMHG | BODY MASS INDEX: 36.06 KG/M2 | HEIGHT: 75 IN | DIASTOLIC BLOOD PRESSURE: 77 MMHG | OXYGEN SATURATION: 98 % | TEMPERATURE: 98.1 F | RESPIRATION RATE: 15 BRPM | WEIGHT: 290 LBS

## 2018-02-13 DIAGNOSIS — K21.9 GASTROESOPHAGEAL REFLUX DISEASE, ESOPHAGITIS PRESENCE NOT SPECIFIED: ICD-10-CM

## 2018-02-13 PROCEDURE — 43239 EGD BIOPSY SINGLE/MULTIPLE: CPT | Performed by: INTERNAL MEDICINE

## 2018-02-13 PROCEDURE — 25010000002 PROPOFOL 10 MG/ML EMULSION: Performed by: NURSE ANESTHETIST, CERTIFIED REGISTERED

## 2018-02-13 PROCEDURE — 87081 CULTURE SCREEN ONLY: CPT | Performed by: INTERNAL MEDICINE

## 2018-02-13 RX ORDER — SODIUM CHLORIDE 0.9 % (FLUSH) 0.9 %
3 SYRINGE (ML) INJECTION AS NEEDED
Status: DISCONTINUED | OUTPATIENT
Start: 2018-02-13 | End: 2018-02-13 | Stop reason: HOSPADM

## 2018-02-13 RX ORDER — PROPOFOL 10 MG/ML
VIAL (ML) INTRAVENOUS AS NEEDED
Status: DISCONTINUED | OUTPATIENT
Start: 2018-02-13 | End: 2018-02-13 | Stop reason: SURG

## 2018-02-13 RX ORDER — SODIUM CHLORIDE 9 MG/ML
500 INJECTION, SOLUTION INTRAVENOUS CONTINUOUS PRN
Status: DISCONTINUED | OUTPATIENT
Start: 2018-02-13 | End: 2018-02-13 | Stop reason: HOSPADM

## 2018-02-13 RX ORDER — LIDOCAINE HYDROCHLORIDE 20 MG/ML
INJECTION, SOLUTION INFILTRATION; PERINEURAL AS NEEDED
Status: DISCONTINUED | OUTPATIENT
Start: 2018-02-13 | End: 2018-02-13 | Stop reason: SURG

## 2018-02-13 RX ADMIN — PROPOFOL 120 MG: 10 INJECTION, EMULSION INTRAVENOUS at 10:07

## 2018-02-13 RX ADMIN — SODIUM CHLORIDE 500 ML: 9 INJECTION, SOLUTION INTRAVENOUS at 08:42

## 2018-02-13 RX ADMIN — LIDOCAINE HYDROCHLORIDE 0.5 ML: 10 INJECTION, SOLUTION EPIDURAL; INFILTRATION; INTRACAUDAL; PERINEURAL at 08:42

## 2018-02-13 RX ADMIN — LIDOCAINE HYDROCHLORIDE 200 MG: 20 INJECTION, SOLUTION INFILTRATION; PERINEURAL at 10:07

## 2018-02-13 NOTE — ANESTHESIA PREPROCEDURE EVALUATION
Anesthesia Evaluation     Patient summary reviewed   no history of anesthetic complications:  NPO Solid Status: > 8 hours             Airway   Mallampati: II  TM distance: >3 FB  Neck ROM: full  Dental      Pulmonary    (+) sleep apnea,   Cardiovascular   Exercise tolerance: good (4-7 METS)    (+) hypertension, hyperlipidemia      Neuro/Psych- negative ROS  GI/Hepatic/Renal/Endo    (+) obesity,  hiatal hernia, GERD, hypothyroidism,     Musculoskeletal     Abdominal    Substance History      OB/GYN          Other                        Anesthesia Plan    ASA 2     general     intravenous induction   Anesthetic plan and risks discussed with patient.

## 2018-02-13 NOTE — PLAN OF CARE
Problem: Patient Care Overview (Adult)  Goal: Plan of Care Review  Outcome: Ongoing (interventions implemented as appropriate)   02/13/18 1009   Patient Care Overview   Progress improving   Outcome Evaluation   Outcome Summary/Follow up Plan pt tolerating proccedure well        Problem: GI Endoscopy (Adult)  Goal: Signs and Symptoms of Listed Potential Problems Will be Absent or Manageable (GI Endoscopy)  Outcome: Ongoing (interventions implemented as appropriate)   02/13/18 1009   GI Endoscopy   Problems Assessed (GI Endoscopy) all   Problems Present (GI Endoscopy) none

## 2018-02-13 NOTE — ANESTHESIA POSTPROCEDURE EVALUATION
Patient: Be Rutherford    Procedure Summary     Date Anesthesia Start Anesthesia Stop Room / Location    02/13/18 1003 1015  [facility] /  [facility]       Procedure Diagnosis Surgeon Provider    ESOPHAGOGASTRODUODENOSCOPY WITH ANESTHESIA (N/A Esophagus) Gastroesophageal reflux disease, esophagitis presence not specified  (Gastroesophageal reflux disease, esophagitis presence not specified [K21.9]) DO Fabian Mcknezie CRNA          Anesthesia Type: general  Last vitals  BP   143/78 (02/13/18 0831)   Temp   98.1 °F (36.7 °C) (02/13/18 0831)   Pulse   88 (02/13/18 0831)   Resp   20 (02/13/18 0831)     SpO2   98 % (02/13/18 0831)     Post Anesthesia Care and Evaluation    Patient location during evaluation: PHASE II  Patient participation: complete - patient participated  Level of consciousness: awake and alert  Pain score: 0  Pain management: adequate  Airway patency: patent  Anesthetic complications: No anesthetic complications    Cardiovascular status: acceptable  Respiratory status: acceptable  Hydration status: acceptable

## 2018-02-13 NOTE — H&P (VIEW-ONLY)
"Chief Complaint   Patient presents with   • GI Problem     was having acid reflux was changed to nexium doing better had endo 1-2-13       Subjective     HPI     Increased acid reflux despite use of daily Protonix.  Acid reflux worse in afternoon.  No nausea or dysphagia.  No abdominal pain. Protonix changed to Nexium OTC (takes 40 mg) in Dec 2017.  He reports improvement in symptoms with change of PPI over past 2 weeks.   He continues to have frequent clearing of throat due to globus sensation.  No changes in bowels, no BRBPR or melan stools.   Consumes \"alot\" of caffeine daily.    Endoscopy (Dr Horvath) 2013 esophagitis  CScope (Dr Horvath) 2010 for eval of diarrhea, procedure normal      Past Medical History:   Diagnosis Date   • Anxiety    • Hyperlipidemia    • Hypertension        Past Surgical History:   Procedure Laterality Date   • APPENDECTOMY     • CHOLECYSTECTOMY     • COLONOSCOPY  08/06/2010    normal   • ENDOSCOPY  01/02/2013    mild gastritis/esophagitis       Outpatient Prescriptions Marked as Taking for the 1/25/18 encounter (Office Visit) with JOSEPH Gtz   Medication Sig Dispense Refill   • amLODIPine (NORVASC) 10 MG tablet Take 10 mg by mouth Daily.     • atorvastatin (LIPITOR) 10 MG tablet Take 10 mg by mouth Daily.     • esomeprazole (nexIUM) 20 MG capsule Take 20 mg by mouth 2 (Two) Times a Day.     • FLUoxetine (PROzac) 10 MG capsule Take 10 mg by mouth Daily.     • irbesartan (AVAPRO) 150 MG tablet Take 150 mg by mouth Every Night.     • levothyroxine (SYNTHROID, LEVOTHROID) 112 MCG tablet Take 112 mcg by mouth Daily.         No Known Allergies    Social History     Social History   • Marital status:      Spouse name: N/A   • Number of children: N/A   • Years of education: N/A     Occupational History   • Not on file.     Social History Main Topics   • Smoking status: Former Smoker   • Smokeless tobacco: Never Used   • Alcohol use Yes      Comment: rare   • Drug use: No   • " "Sexual activity: Not on file     Other Topics Concern   • Not on file     Social History Narrative       Family History   Problem Relation Age of Onset   • Elizabeth's esophagus Mother        Review of Systems   Constitutional: Negative for fatigue, fever and unexpected weight change.   HENT: Negative for hearing loss, sore throat and voice change.    Eyes: Negative for visual disturbance.   Respiratory: Negative for cough, shortness of breath and wheezing.    Cardiovascular: Negative for chest pain and palpitations.   Gastrointestinal: Negative for abdominal pain, blood in stool and vomiting.   Endocrine: Negative for polydipsia and polyuria.   Genitourinary: Negative for difficulty urinating, dysuria, hematuria and urgency.   Musculoskeletal: Negative for joint swelling and myalgias.   Skin: Negative for color change, rash and wound.   Neurological: Negative for dizziness, tremors, seizures and syncope.   Hematological: Does not bruise/bleed easily.   Psychiatric/Behavioral: Negative for agitation and confusion. The patient is not nervous/anxious.        Objective     Vitals:    01/25/18 0922   BP: 144/84   Pulse: 82   Temp: 97.1 °F (36.2 °C)   SpO2: 98%   Weight: 133 kg (294 lb)   Height: 190.5 cm (75\")     Body mass index is 36.75 kg/(m^2).    Physical Exam   Constitutional: He is oriented to person, place, and time. He appears well-developed and well-nourished.   HENT:   Head: Normocephalic and atraumatic.   Eyes:   Pink, Nonicteric   Neck:   Global Assessment- supple. No JVD or lymphadenopathy   Cardiovascular: Normal rate, regular rhythm and normal heart sounds.  Exam reveals no gallop and no friction rub.    No murmur heard.  Pulmonary/Chest: Effort normal and breath sounds normal. No respiratory distress. He has no wheezes. He has no rales.   Inspection: Movements-Symmetrical   Abdominal: Soft. Bowel sounds are normal. He exhibits no distension and no mass. There is no tenderness. There is no rebound and no " guarding.   Neurological: He is alert and oriented to person, place, and time.   General Exam-Deemed a reliable historian, able to converse without difficulty and Able to move all extremities without difficulty       Imaging Results (most recent)     None          Assessment/Plan     Be was seen today for gi problem.    Diagnoses and all orders for this visit:    Gastroesophageal reflux disease, esophagitis presence not specified  -     Case Request; Standing  -     Implement Anesthesia Orders Day of Procedure; Standing  -     Obtain Informed Consent; Standing  -     Case Request    Globus sensation      ESOPHAGOGASTRODUODENOSCOPY WITH ANESTHESIA (N/A)    Decrease caffeine intake  Cont OTC Nexium 2 in the morning 30 min prior to breakfast    Patient is to continue all blood pressure and cardiac medications prior to procedure.     Patient's BMI is above normal parameters. Follow-up plan includes:  no follow-up required     The risk of the endoscopy were discussed in detail.  We discussed the risk of perforation (one out of 8402-0601, riskier with dilation), bleeding (one out of 500), and the rare risks of infection, adverse reaction to anesthesia, respiratory failure, cardiac failure including MI and adverse reaction to medications, etc.  We discussed consequences that could occur if a risk were to develop such as the need for hospitalization, blood transfusion, surgical intervention, medications, pain and disability and death.  Alternatives include not doing anything, or pursuing an UGI series which only offers a diagnosis with potential less accuracy compared to egd.  The patient verbalizes understanding and agrees to proceed.      .    Patient Instructions   Gastroesophageal Reflux Disease, Adult    Gastroesophageal reflux disease (GERD) happens when acid from your stomach flows up into the esophagus. When acid comes in contact with the esophagus, the acid causes soreness (inflammation) in the esophagus. Over  time, GERD may create small holes (ulcers) in the lining of the esophagus.  CAUSES    · Increased body weight. This puts pressure on the stomach, making acid rise from the stomach into the esophagus.  · Smoking. This increases acid production in the stomach.  · Drinking alcohol. This causes decreased pressure in the lower esophageal sphincter (valve or ring of muscle between the esophagus and stomach), allowing acid from the stomach into the esophagus.  · Late evening meals and a full stomach. This increases pressure and acid production in the stomach.  · A malformed lower esophageal sphincter.  Sometimes, no cause is found.  SYMPTOMS    · Burning pain in the lower part of the mid-chest behind the breastbone and in the mid-stomach area. This may occur twice a week or more often.  · Trouble swallowing.  · Sore throat.  · Dry cough.  · Asthma-like symptoms including chest tightness, shortness of breath, or wheezing.  DIAGNOSIS    Your caregiver may be able to diagnose GERD based on your symptoms. In some cases, X-rays and other tests may be done to check for complications or to check the condition of your stomach and esophagus.  TREATMENT    Your caregiver may recommend over-the-counter or prescription medicines to help decrease acid production. Ask your caregiver before starting or adding any new medicines.    HOME CARE INSTRUCTIONS    · Change the factors that you can control. Ask your caregiver for guidance concerning weight loss, quitting smoking, and alcohol consumption.  · Avoid foods and drinks that make your symptoms worse, such as:  ¨ Caffeine or alcoholic drinks.  ¨ Chocolate.  ¨ Peppermint or mint flavorings.  ¨ Garlic and onions.  ¨ Spicy foods.  ¨ Citrus fruits, such as oranges, constantino, or limes.  ¨ Tomato-based foods such as sauce, chili, salsa, and pizza.  ¨ Fried and fatty foods.  · Avoid lying down for the 3 hours prior to your bedtime or prior to taking a nap.  · Eat small, frequent meals instead of  large meals.  · Wear loose-fitting clothing. Do not wear anything tight around your waist that causes pressure on your stomach.  · Raise the head of your bed 6 to 8 inches with wood blocks to help you sleep. Extra pillows will not help.  · Only take over-the-counter or prescription medicines for pain, discomfort, or fever as directed by your caregiver.  · Do not take aspirin, ibuprofen, or other nonsteroidal anti-inflammatory drugs (NSAIDs).  SEEK IMMEDIATE MEDICAL CARE IF:    · You have pain in your arms, neck, jaw, teeth, or back.  · Your pain increases or changes in intensity or duration.  · You develop nausea, vomiting, or sweating (diaphoresis).  · You develop shortness of breath, or you faint.  · Your vomit is green, yellow, black, or looks like coffee grounds or blood.  · Your stool is red, bloody, or black.  These symptoms could be signs of other problems, such as heart disease, gastric bleeding, or esophageal bleeding.  MAKE SURE YOU:    · Understand these instructions.  · Will watch your condition.  · Will get help right away if you are not doing well or get worse.     This information is not intended to replace advice given to you by your health care provider. Make sure you discuss any questions you have with your health care provider.     Document Released: 09/27/2006 Document Revised: 01/08/2016 Document Reviewed: 04/13/2016  [employer] Interactive Patient Education ©2016 [employer]

## 2018-02-13 NOTE — PLAN OF CARE
Problem: Patient Care Overview (Adult)  Goal: Adult Individualization and Mutuality  Outcome: Ongoing (interventions implemented as appropriate)   02/13/18 0830   Individualization   Patient Specific Preferences none   Patient Specific Goals none   Patient Specific Interventions none   Mutuality/Individual Preferences   What Anxieties, Fears or Concerns Do You Have About Your Health or Care? none   What Questions Do You Have About Your Health or Care? none   What Information Would Help Us Give You More Personalized Care? none

## 2018-02-14 LAB — UREASE TISS QL: NEGATIVE

## 2018-09-11 ENCOUNTER — TELEPHONE (OUTPATIENT)
Dept: FAMILY MEDICINE CLINIC | Age: 43
End: 2018-09-11

## 2018-09-11 DIAGNOSIS — E55.9 VITAMIN D DEFICIENCY: ICD-10-CM

## 2018-09-11 DIAGNOSIS — E78.2 MIXED HYPERLIPIDEMIA: Primary | ICD-10-CM

## 2018-09-11 DIAGNOSIS — I10 ESSENTIAL HYPERTENSION: ICD-10-CM

## 2018-09-11 DIAGNOSIS — E03.9 ACQUIRED HYPOTHYROIDISM: ICD-10-CM

## 2018-09-11 NOTE — TELEPHONE ENCOUNTER
[address] [facility] Clinical Staff   Caller: Unspecified (Yesterday,  3:28 PM)             Pt would like to inquire if he will need lab's drawn for his up coming apptmt? If so please add to Epic and advise the pt please. Phone # [phone]   Can leave a message   Thank you!

## 2018-09-11 NOTE — TELEPHONE ENCOUNTER
----- Message from Britton Bello sent at 9/10/2018  3:28 PM CDT -----  Pt would like to inquire if he will need lab's drawn for his up coming apptmt? If so please add to Epic and advise the pt please. Phone # [phone]  Can leave a message  Thank you!

## 2018-09-17 DIAGNOSIS — E78.2 MIXED HYPERLIPIDEMIA: ICD-10-CM

## 2018-09-17 DIAGNOSIS — E03.9 ACQUIRED HYPOTHYROIDISM: ICD-10-CM

## 2018-09-17 DIAGNOSIS — E55.9 VITAMIN D DEFICIENCY: ICD-10-CM

## 2018-09-17 DIAGNOSIS — I10 ESSENTIAL HYPERTENSION: ICD-10-CM

## 2018-09-17 LAB
ALBUMIN SERPL-MCNC: 4.4 G/DL (ref 3.5–5.2)
ALP BLD-CCNC: 89 U/L (ref 40–130)
ALT SERPL-CCNC: 30 U/L (ref 5–41)
ANION GAP SERPL CALCULATED.3IONS-SCNC: 12 MMOL/L (ref 7–19)
AST SERPL-CCNC: 19 U/L (ref 5–40)
BASOPHILS ABSOLUTE: 0.1 K/UL (ref 0–0.2)
BASOPHILS RELATIVE PERCENT: 1 % (ref 0–1)
BILIRUB SERPL-MCNC: 0.6 MG/DL (ref 0.2–1.2)
BILIRUBIN URINE: NEGATIVE
BLOOD, URINE: NEGATIVE
BUN BLDV-MCNC: 9 MG/DL (ref 6–20)
CALCIUM SERPL-MCNC: 9.1 MG/DL (ref 8.6–10)
CHLORIDE BLD-SCNC: 103 MMOL/L (ref 98–111)
CHOLESTEROL, TOTAL: 180 MG/DL (ref 160–199)
CLARITY: CLEAR
CO2: 24 MMOL/L (ref 22–29)
COLOR: YELLOW
CREAT SERPL-MCNC: 0.8 MG/DL (ref 0.5–1.2)
EOSINOPHILS ABSOLUTE: 0.5 K/UL (ref 0–0.6)
EOSINOPHILS RELATIVE PERCENT: 4.4 % (ref 0–5)
GFR NON-AFRICAN AMERICAN: >60
GLUCOSE BLD-MCNC: 92 MG/DL (ref 74–109)
GLUCOSE URINE: NEGATIVE MG/DL
HCT VFR BLD CALC: 44.8 % (ref 42–52)
HDLC SERPL-MCNC: 48 MG/DL (ref 55–121)
HEMOGLOBIN: 14.9 G/DL (ref 14–18)
KETONES, URINE: ABNORMAL MG/DL
LDL CHOLESTEROL CALCULATED: 110 MG/DL
LEUKOCYTE ESTERASE, URINE: NEGATIVE
LYMPHOCYTES ABSOLUTE: 1.4 K/UL (ref 1.1–4.5)
LYMPHOCYTES RELATIVE PERCENT: 13.5 % (ref 20–40)
MCH RBC QN AUTO: 30.5 PG (ref 27–31)
MCHC RBC AUTO-ENTMCNC: 33.3 G/DL (ref 33–37)
MCV RBC AUTO: 91.6 FL (ref 80–94)
MONOCYTES ABSOLUTE: 1.2 K/UL (ref 0–0.9)
MONOCYTES RELATIVE PERCENT: 11.2 % (ref 0–10)
NEUTROPHILS ABSOLUTE: 7.2 K/UL (ref 1.5–7.5)
NEUTROPHILS RELATIVE PERCENT: 69.7 % (ref 50–65)
NITRITE, URINE: NEGATIVE
PDW BLD-RTO: 13.2 % (ref 11.5–14.5)
PH UA: 6
PLATELET # BLD: 333 K/UL (ref 130–400)
PMV BLD AUTO: 10.2 FL (ref 9.4–12.4)
POTASSIUM SERPL-SCNC: 4.2 MMOL/L (ref 3.5–5)
PROTEIN UA: NEGATIVE MG/DL
RBC # BLD: 4.89 M/UL (ref 4.7–6.1)
SODIUM BLD-SCNC: 139 MMOL/L (ref 136–145)
SPECIFIC GRAVITY UA: 1.02
T4 FREE: 1.4 NG/DL (ref 0.9–1.7)
TOTAL PROTEIN: 6.8 G/DL (ref 6.6–8.7)
TRIGL SERPL-MCNC: 111 MG/DL (ref 0–149)
TSH SERPL DL<=0.05 MIU/L-ACNC: 4.95 UIU/ML (ref 0.27–4.2)
URINE REFLEX TO CULTURE: ABNORMAL
UROBILINOGEN, URINE: 0.2 E.U./DL
VITAMIN D 25-HYDROXY: 22.9 NG/ML
WBC # BLD: 10.4 K/UL (ref 4.8–10.8)

## 2018-09-21 ENCOUNTER — OFFICE VISIT (OUTPATIENT)
Dept: FAMILY MEDICINE CLINIC | Age: 43
End: 2018-09-21
Payer: COMMERCIAL

## 2018-09-21 ENCOUNTER — HOSPITAL ENCOUNTER (OUTPATIENT)
Dept: GENERAL RADIOLOGY | Age: 43
Discharge: HOME | End: 2018-09-21
Payer: COMMERCIAL

## 2018-09-21 VITALS
RESPIRATION RATE: 20 BRPM | HEIGHT: 75 IN | WEIGHT: 282 LBS | DIASTOLIC BLOOD PRESSURE: 68 MMHG | BODY MASS INDEX: 35.06 KG/M2 | SYSTOLIC BLOOD PRESSURE: 118 MMHG | HEART RATE: 70 BPM | OXYGEN SATURATION: 98 % | TEMPERATURE: 97.5 F

## 2018-09-21 DIAGNOSIS — R06.02 EXERTIONAL SHORTNESS OF BREATH: ICD-10-CM

## 2018-09-21 DIAGNOSIS — I10 ESSENTIAL HYPERTENSION: ICD-10-CM

## 2018-09-21 DIAGNOSIS — E03.8 OTHER SPECIFIED HYPOTHYROIDISM: ICD-10-CM

## 2018-09-21 DIAGNOSIS — M54.50 ACUTE MIDLINE LOW BACK PAIN WITHOUT SCIATICA: Primary | ICD-10-CM

## 2018-09-21 DIAGNOSIS — E55.9 VITAMIN D DEFICIENCY: ICD-10-CM

## 2018-09-21 DIAGNOSIS — M54.50 ACUTE MIDLINE LOW BACK PAIN WITHOUT SCIATICA: ICD-10-CM

## 2018-09-21 DIAGNOSIS — Z13.31 POSITIVE DEPRESSION SCREENING: ICD-10-CM

## 2018-09-21 DIAGNOSIS — F34.1 DYSTHYMIA: ICD-10-CM

## 2018-09-21 PROCEDURE — G0444 DEPRESSION SCREEN ANNUAL: HCPCS | Performed by: NURSE PRACTITIONER

## 2018-09-21 PROCEDURE — 72100 X-RAY EXAM L-S SPINE 2/3 VWS: CPT

## 2018-09-21 PROCEDURE — 71046 X-RAY EXAM CHEST 2 VIEWS: CPT

## 2018-09-21 PROCEDURE — G8431 POS CLIN DEPRES SCRN F/U DOC: HCPCS | Performed by: NURSE PRACTITIONER

## 2018-09-21 PROCEDURE — 94010 BREATHING CAPACITY TEST: CPT | Performed by: NURSE PRACTITIONER

## 2018-09-21 PROCEDURE — 99214 OFFICE O/P EST MOD 30 MIN: CPT | Performed by: NURSE PRACTITIONER

## 2018-09-21 RX ORDER — CHOLECALCIFEROL (VITAMIN D3) 1250 MCG
CAPSULE ORAL
Qty: 12 CAPSULE | Refills: 0 | Status: SHIPPED | OUTPATIENT
Start: 2018-09-21 | End: 2020-06-15 | Stop reason: SDUPTHER

## 2018-09-21 RX ORDER — LEVOTHYROXINE SODIUM 0.2 MG/1
TABLET ORAL
Qty: 90 TABLET | Refills: 1 | Status: SHIPPED | OUTPATIENT
Start: 2018-09-21 | End: 2018-12-29 | Stop reason: SDUPTHER

## 2018-09-21 RX ORDER — IRBESARTAN 150 MG/1
TABLET ORAL
Qty: 90 TABLET | Refills: 1 | Status: SHIPPED | OUTPATIENT
Start: 2018-09-21 | End: 2018-12-29 | Stop reason: SDUPTHER

## 2018-09-21 RX ORDER — LEVOTHYROXINE SODIUM 0.03 MG/1
25 TABLET ORAL DAILY
Qty: 90 TABLET | Refills: 1 | Status: SHIPPED | OUTPATIENT
Start: 2018-09-21 | End: 2018-12-29 | Stop reason: SDUPTHER

## 2018-09-21 RX ORDER — AMLODIPINE BESYLATE 10 MG/1
TABLET ORAL
Qty: 90 TABLET | Refills: 1 | Status: SHIPPED | OUTPATIENT
Start: 2018-09-21 | End: 2018-12-29 | Stop reason: SDUPTHER

## 2018-09-21 RX ORDER — BUPROPION HYDROCHLORIDE 150 MG/1
150 TABLET ORAL EVERY MORNING
Qty: 30 TABLET | Refills: 1 | Status: SHIPPED | OUTPATIENT
Start: 2018-09-21 | End: 2018-12-29 | Stop reason: SDUPTHER

## 2018-09-21 RX ORDER — CYCLOBENZAPRINE HCL 10 MG
10 TABLET ORAL NIGHTLY
Qty: 30 TABLET | Refills: 0 | Status: SHIPPED | OUTPATIENT
Start: 2018-09-21 | End: 2018-10-01

## 2018-09-21 ASSESSMENT — PATIENT HEALTH QUESTIONNAIRE - PHQ9
7. TROUBLE CONCENTRATING ON THINGS, SUCH AS READING THE NEWSPAPER OR WATCHING TELEVISION: 2
1. LITTLE INTEREST OR PLEASURE IN DOING THINGS: 2
3. TROUBLE FALLING OR STAYING ASLEEP: 2
9. THOUGHTS THAT YOU WOULD BE BETTER OFF DEAD, OR OF HURTING YOURSELF: 0
8. MOVING OR SPEAKING SO SLOWLY THAT OTHER PEOPLE COULD HAVE NOTICED. OR THE OPPOSITE, BEING SO FIGETY OR RESTLESS THAT YOU HAVE BEEN MOVING AROUND A LOT MORE THAN USUAL: 0
SUM OF ALL RESPONSES TO PHQ QUESTIONS 1-9: 10
2. FEELING DOWN, DEPRESSED OR HOPELESS: 2
SUM OF ALL RESPONSES TO PHQ9 QUESTIONS 1 & 2: 4
4. FEELING TIRED OR HAVING LITTLE ENERGY: 2
5. POOR APPETITE OR OVEREATING: 0
10. IF YOU CHECKED OFF ANY PROBLEMS, HOW DIFFICULT HAVE THESE PROBLEMS MADE IT FOR YOU TO DO YOUR WORK, TAKE CARE OF THINGS AT HOME, OR GET ALONG WITH OTHER PEOPLE: 2
6. FEELING BAD ABOUT YOURSELF - OR THAT YOU ARE A FAILURE OR HAVE LET YOURSELF OR YOUR FAMILY DOWN: 0
SUM OF ALL RESPONSES TO PHQ QUESTIONS 1-9: 10

## 2018-09-21 ASSESSMENT — ENCOUNTER SYMPTOMS
SHORTNESS OF BREATH: 0
BACK PAIN: 1
DIARRHEA: 0
CONSTIPATION: 0
TROUBLE SWALLOWING: 0

## 2018-09-21 NOTE — PATIENT INSTRUCTIONS
Adding wellbutrin and call report in 2-3wks with report but pt is aware F/u appt is ideal  Lab in 6wks  Side effects of new treatment discussed. Pt will f/u asap if any increase in negativity. Never stop medication without consulting healthcare provider.   Spirometry today  We will hold off on stress echo as pt has had one 2017 and was within normal limits

## 2018-09-21 NOTE — PROGRESS NOTES
feel that he is battling anxiety. Patient has seen counseling in the past. She feels is a lifelong problem for him but he does feel his symptoms are worsening as far as his energy and desire to do things. No reported ideas of self-harm. While here today patient is also requesting refills on any and all medications that are needed. Patient did just have blood testing done approximately 4 days ago. Lab work is as follows and has been discussed in detail with patient:    No visits with results within 1 Day(s) from this visit.    Latest known visit with results is:   Orders Only on 09/17/2018   Component Date Value Ref Range Status    Color, UA 09/17/2018 YELLOW  Straw/Yellow Final    Clarity, UA 09/17/2018 Clear  Clear Final    Glucose, Ur 09/17/2018 Negative  Negative mg/dL Final    Bilirubin Urine 09/17/2018 Negative  Negative Final    Ketones, Urine 09/17/2018 TRACE* Negative mg/dL Final    Specific Gravity, UA 09/17/2018 1.024  1.005 - 1.030 Final    Blood, Urine 09/17/2018 Negative  Negative Final    pH, UA 09/17/2018 6.0  5.0 - 8.0 Final    Protein, UA 09/17/2018 Negative  Negative mg/dL Final    Urobilinogen, Urine 09/17/2018 0.2  <2.0 E.U./dL Final    Nitrite, Urine 09/17/2018 Negative  Negative Final    Leukocyte Esterase, Urine 09/17/2018 Negative  Negative Final    Urine Reflex to Culture 09/17/2018 Not Indicated   Final    WBC 09/17/2018 10.4  4.8 - 10.8 K/uL Final    RBC 09/17/2018 4.89  4.70 - 6.10 M/uL Final    Hemoglobin 09/17/2018 14.9  14.0 - 18.0 g/dL Final    Hematocrit 09/17/2018 44.8  42.0 - 52.0 % Final    MCV 09/17/2018 91.6  80.0 - 94.0 fL Final    MCH 09/17/2018 30.5  27.0 - 31.0 pg Final    MCHC 09/17/2018 33.3  33.0 - 37.0 g/dL Final    RDW 09/17/2018 13.2  11.5 - 14.5 % Final    Platelets 68/84/4186 333  130 - 400 K/uL Final    MPV 09/17/2018 10.2  9.4 - 12.4 fL Final    Neutrophils % 09/17/2018 69.7* 50.0 - 65.0 % Final    Lymphocytes % 09/17/2018 13.5* 20.0 - DISEASE      LDL Calculated 09/17/2018 110  <100 mg/dL Final    Comment: <100 MG/DL=OPITIMAL    100-129 MG/DL=DESIRABLE    130-159 MG/DL BORDERLINE=INCREASED RISK OF ATHEROSCLEROTIC  CARDIOVASCULAR DISEASE    > OR = 160 MG/DL=ASSOCIATED WITH AN INCREASE RISK OF  ATHEROSCLEROTIC CARDIOVASCULAR DISEASE      T4 Free 09/17/2018 1.4  0.9 - 1.7 ng/dL Final    TSH 09/17/2018 4.950* 0.270 - 4.200 uIU/mL Final    Vit D, 25-Hydroxy 09/17/2018 22.9* >=30 ng/mL Final    Comment: <=20 ng/mL. ........... [facility] Deficient  21-29 ng/mL. ......... [facility] Insufficient  >=30 ng/mL. ........ [facility] Sufficient             Past Medical History:   Diagnosis Date    Hyperlipidemia     Hypertension     Hypothyroidism     Unspecified sleep apnea      Prior to Visit Medications    Medication Sig Taking?  Authorizing Provider   irbesartan (AVAPRO) 150 MG tablet TAKE 1 TABLET DAILY Yes DENISHA Ashton   amLODIPine (NORVASC) 10 MG tablet TAKE 1 TABLET DAILY Yes DENISHA Ashton   levothyroxine (SYNTHROID) 200 MCG tablet TAKE 1 TABLET DAILY Yes DENISHA Ashton   levothyroxine (SYNTHROID) 25 MCG tablet Take 1 tablet by mouth Daily Yes DENISHA Ashton   Cholecalciferol (VITAMIN D3) 26617 units CAPS 1 po weekly x 12wks then repeat lab Yes DENISHA Ashton   buPROPion (WELLBUTRIN XL) 150 MG extended release tablet Take 1 tablet by mouth every morning Yes DENISHA Ashton   cyclobenzaprine (FLEXERIL) 10 MG tablet Take 1 tablet by mouth nightly for 10 days Yes DENISHA Ashton   mometasone-formoterol (DULERA) 100-5 MCG/ACT inhaler Inhale 2 puffs into the lungs 2 times daily Yes DENISHA Ashton   FLUoxetine (PROZAC) 10 MG capsule TAKE 1 CAPSULE DAILY Yes DENISHA Ashton   atorvastatin (LIPITOR) 40 MG tablet TAKE 1 TABLET NIGHTLY Yes DENISHA Ashton   esomeprazole (NEXIUM) 40 MG delayed release capsule Take 1 capsule by mouth every morning (before breakfast) Yes DENISHA Ashton     No Known Allergies  Past Surgical History:   Procedure Laterality Date   

## 2018-09-26 ENCOUNTER — TELEPHONE (OUTPATIENT)
Dept: FAMILY MEDICINE CLINIC | Age: 43
End: 2018-09-26

## 2018-09-26 DIAGNOSIS — R06.02 SHORTNESS OF BREATH: Primary | ICD-10-CM

## 2018-09-26 NOTE — TELEPHONE ENCOUNTER
I did give pt sample. Please let him know it has been denied for script. If sx are improved with tx, we will try an alternate.

## 2018-09-27 NOTE — TELEPHONE ENCOUNTER
Patient says that [facility] is helping some. Patient said that it was discussed in office visit the possibility of him having asthma and would like to be referred to Dr. Laverne Silva. If asthma is suspected Dulera can possibly be appealed.

## 2018-10-01 DIAGNOSIS — E78.2 MIXED HYPERLIPIDEMIA: ICD-10-CM

## 2018-10-01 DIAGNOSIS — F41.9 ANXIETY: ICD-10-CM

## 2018-10-01 RX ORDER — ATORVASTATIN CALCIUM 40 MG/1
TABLET, FILM COATED ORAL
Qty: 90 TABLET | Refills: 0 | Status: SHIPPED | OUTPATIENT
Start: 2018-10-01 | End: 2018-12-29 | Stop reason: SDUPTHER

## 2018-10-01 RX ORDER — FLUOXETINE 10 MG/1
CAPSULE ORAL
Qty: 90 CAPSULE | Refills: 0 | Status: SHIPPED | OUTPATIENT
Start: 2018-10-01 | End: 2018-12-29 | Stop reason: SDUPTHER

## 2018-10-15 ENCOUNTER — TELEPHONE (OUTPATIENT)
Dept: FAMILY MEDICINE CLINIC | Age: 43
End: 2018-10-15

## 2018-10-31 RX ORDER — FLUOXETINE 10 MG/1
TABLET, FILM COATED ORAL
Qty: 15 TABLET | Refills: 0 | Status: SHIPPED | OUTPATIENT
Start: 2018-10-31 | End: 2019-10-04 | Stop reason: SDUPTHER

## 2018-12-28 DIAGNOSIS — E03.8 OTHER SPECIFIED HYPOTHYROIDISM: ICD-10-CM

## 2018-12-28 DIAGNOSIS — E55.9 VITAMIN D DEFICIENCY: ICD-10-CM

## 2018-12-28 LAB
T4 FREE: 1.6 NG/DL (ref 0.9–1.7)
TSH SERPL DL<=0.05 MIU/L-ACNC: 3.78 UIU/ML (ref 0.27–4.2)
VITAMIN D 25-HYDROXY: 24.8 NG/ML

## 2018-12-29 ENCOUNTER — OFFICE VISIT (OUTPATIENT)
Dept: FAMILY MEDICINE CLINIC | Age: 43
End: 2018-12-29
Payer: COMMERCIAL

## 2018-12-29 VITALS
HEART RATE: 84 BPM | WEIGHT: 266 LBS | RESPIRATION RATE: 20 BRPM | DIASTOLIC BLOOD PRESSURE: 62 MMHG | OXYGEN SATURATION: 99 % | SYSTOLIC BLOOD PRESSURE: 122 MMHG | TEMPERATURE: 98.5 F | HEIGHT: 75 IN | BODY MASS INDEX: 33.07 KG/M2

## 2018-12-29 DIAGNOSIS — F41.9 ANXIETY: ICD-10-CM

## 2018-12-29 DIAGNOSIS — E78.2 MIXED HYPERLIPIDEMIA: ICD-10-CM

## 2018-12-29 DIAGNOSIS — I10 ESSENTIAL HYPERTENSION: Primary | ICD-10-CM

## 2018-12-29 DIAGNOSIS — F34.1 DYSTHYMIA: ICD-10-CM

## 2018-12-29 DIAGNOSIS — E03.8 OTHER SPECIFIED HYPOTHYROIDISM: ICD-10-CM

## 2018-12-29 PROCEDURE — 99214 OFFICE O/P EST MOD 30 MIN: CPT | Performed by: NURSE PRACTITIONER

## 2018-12-29 RX ORDER — AMLODIPINE BESYLATE 5 MG/1
TABLET ORAL
Qty: 90 TABLET | Refills: 1 | Status: SHIPPED | OUTPATIENT
Start: 2018-12-29 | End: 2019-10-08 | Stop reason: SDUPTHER

## 2018-12-29 RX ORDER — BUPROPION HYDROCHLORIDE 150 MG/1
150 TABLET ORAL EVERY MORNING
Qty: 90 TABLET | Refills: 1 | Status: SHIPPED | OUTPATIENT
Start: 2018-12-29 | End: 2019-02-12 | Stop reason: SDUPTHER

## 2018-12-29 RX ORDER — ATORVASTATIN CALCIUM 40 MG/1
TABLET, FILM COATED ORAL
Qty: 90 TABLET | Refills: 1 | Status: SHIPPED | OUTPATIENT
Start: 2018-12-29 | End: 2019-10-08 | Stop reason: SDUPTHER

## 2018-12-29 RX ORDER — FLUOXETINE 10 MG/1
TABLET, FILM COATED ORAL
Qty: 15 TABLET | Refills: 0 | Status: CANCELLED | OUTPATIENT
Start: 2018-12-29

## 2018-12-29 RX ORDER — FLUOXETINE 10 MG/1
CAPSULE ORAL
Qty: 90 CAPSULE | Refills: 1 | Status: SHIPPED | OUTPATIENT
Start: 2018-12-29 | End: 2019-10-08 | Stop reason: SDUPTHER

## 2018-12-29 RX ORDER — LEVOTHYROXINE SODIUM 0.2 MG/1
TABLET ORAL
Qty: 90 TABLET | Refills: 1 | Status: SHIPPED | OUTPATIENT
Start: 2018-12-29 | End: 2019-02-12 | Stop reason: SDUPTHER

## 2018-12-29 RX ORDER — IRBESARTAN 150 MG/1
TABLET ORAL
Qty: 90 TABLET | Refills: 1 | Status: SHIPPED | OUTPATIENT
Start: 2018-12-29 | End: 2019-02-13 | Stop reason: RX

## 2018-12-29 RX ORDER — ALBUTEROL SULFATE 90 UG/1
2 AEROSOL, METERED RESPIRATORY (INHALATION) EVERY 6 HOURS PRN
COMMUNITY
End: 2021-06-14 | Stop reason: SDUPTHER

## 2018-12-29 RX ORDER — LEVOTHYROXINE SODIUM 0.03 MG/1
25 TABLET ORAL DAILY
Qty: 90 TABLET | Refills: 1 | Status: SHIPPED | OUTPATIENT
Start: 2018-12-29 | End: 2019-02-12 | Stop reason: SDUPTHER

## 2018-12-29 ASSESSMENT — ENCOUNTER SYMPTOMS
TROUBLE SWALLOWING: 0
SHORTNESS OF BREATH: 0

## 2018-12-29 NOTE — PROGRESS NOTES
Maternal Grandmother     Coronary Art Dis Father      Social History     Social History    Marital status:      Spouse name: N/A    Number of children: N/A    Years of education: N/A     Occupational History    Not on file. Social History Main Topics    Smoking status: Former Smoker     Quit date: 6/6/1999    Smokeless tobacco: Never Used    Alcohol use No    Drug use: No    Sexual activity: Not on file     Other Topics Concern    Not on file     Social History Narrative    No narrative on file       Review of Systems   Constitutional: Negative for unexpected weight change. HENT: Negative for trouble swallowing. Respiratory: Negative for shortness of breath. Cardiovascular: Negative for chest pain. Psychiatric/Behavioral: The patient is nervous/anxious. OBJECTIVE:    Physical Exam   Constitutional: He is oriented to person, place, and time. He appears well-developed and well-nourished. HENT:   Head: Normocephalic and atraumatic. Right Ear: External ear normal.   Left Ear: External ear normal.   Mouth/Throat: Oropharynx is clear and moist. No oropharyngeal exudate. Eyes: Pupils are equal, round, and reactive to light. Conjunctivae and EOM are normal.   Neck: Neck supple. No tracheal deviation present. Cardiovascular: Normal rate, regular rhythm and normal heart sounds. Pulmonary/Chest: Effort normal and breath sounds normal.   Abdominal: Soft. Bowel sounds are normal. He exhibits no distension. Musculoskeletal: He exhibits no edema (no swelling of BLE). Neurological: He is alert and oriented to person, place, and time. Skin: Skin is warm and dry. Psychiatric: He has a normal mood and affect. His speech is normal and behavior is normal. Judgment and thought content normal. His mood appears not anxious. His affect is not angry. Cognition and memory are normal. He does not exhibit a depressed mood. Nursing note and vitals reviewed.      /62 (Site: Left

## 2019-02-12 ENCOUNTER — TELEPHONE (OUTPATIENT)
Dept: FAMILY MEDICINE CLINIC | Age: 44
End: 2019-02-12

## 2019-02-12 DIAGNOSIS — F34.1 DYSTHYMIA: ICD-10-CM

## 2019-02-12 DIAGNOSIS — E03.8 OTHER SPECIFIED HYPOTHYROIDISM: ICD-10-CM

## 2019-02-12 RX ORDER — LEVOTHYROXINE SODIUM 0.2 MG/1
TABLET ORAL
Qty: 30 TABLET | Refills: 0 | Status: SHIPPED | OUTPATIENT
Start: 2019-02-12 | End: 2019-03-07 | Stop reason: SDUPTHER

## 2019-02-12 RX ORDER — BUPROPION HYDROCHLORIDE 150 MG/1
150 TABLET ORAL EVERY MORNING
Qty: 30 TABLET | Refills: 0 | Status: SHIPPED | OUTPATIENT
Start: 2019-02-12 | End: 2019-03-07 | Stop reason: SDUPTHER

## 2019-02-12 RX ORDER — LEVOTHYROXINE SODIUM 0.03 MG/1
25 TABLET ORAL DAILY
Qty: 30 TABLET | Refills: 0 | Status: SHIPPED | OUTPATIENT
Start: 2019-02-12 | End: 2019-03-07 | Stop reason: SDUPTHER

## 2019-02-13 DIAGNOSIS — I10 HYPERTENSION, UNSPECIFIED TYPE: Primary | ICD-10-CM

## 2019-02-13 RX ORDER — OLMESARTAN MEDOXOMIL 20 MG/1
20 TABLET ORAL DAILY
Qty: 30 TABLET | Refills: 5 | OUTPATIENT
Start: 2019-02-13 | End: 2019-10-08 | Stop reason: SDUPTHER

## 2019-02-13 RX ORDER — OLMESARTAN MEDOXOMIL 20 MG/1
20 TABLET ORAL DAILY
Qty: 30 TABLET | Refills: 5 | Status: SHIPPED | OUTPATIENT
Start: 2019-02-13 | End: 2019-02-13 | Stop reason: SDUPTHER

## 2019-03-06 LAB
AVERAGE GLUCOSE: NORMAL
CHOLESTEROL, TOTAL: 152 MG/DL
CHOLESTEROL/HDL RATIO: 3.2
HBA1C MFR BLD: 5.4 %
HDLC SERPL-MCNC: 48 MG/DL (ref 35–70)
LDL CHOLESTEROL CALCULATED: 93 MG/DL (ref 0–160)
TRIGL SERPL-MCNC: 53 MG/DL
VLDLC SERPL CALC-MCNC: NORMAL MG/DL

## 2019-03-07 ENCOUNTER — NURSE ONLY (OUTPATIENT)
Dept: FAMILY MEDICINE CLINIC | Age: 44
End: 2019-03-07

## 2019-03-07 VITALS — DIASTOLIC BLOOD PRESSURE: 74 MMHG | SYSTOLIC BLOOD PRESSURE: 120 MMHG

## 2019-03-07 DIAGNOSIS — F34.1 DYSTHYMIA: ICD-10-CM

## 2019-03-07 DIAGNOSIS — I10 ESSENTIAL HYPERTENSION: Primary | ICD-10-CM

## 2019-03-07 DIAGNOSIS — E03.8 OTHER SPECIFIED HYPOTHYROIDISM: ICD-10-CM

## 2019-03-07 RX ORDER — BUPROPION HYDROCHLORIDE 150 MG/1
150 TABLET ORAL EVERY MORNING
Qty: 30 TABLET | Refills: 1 | Status: SHIPPED | OUTPATIENT
Start: 2019-03-07 | End: 2019-10-08 | Stop reason: SDUPTHER

## 2019-03-07 RX ORDER — LEVOTHYROXINE SODIUM 0.03 MG/1
25 TABLET ORAL DAILY
Qty: 30 TABLET | Refills: 1 | Status: SHIPPED | OUTPATIENT
Start: 2019-03-07 | End: 2019-10-08 | Stop reason: SDUPTHER

## 2019-03-07 RX ORDER — LEVOTHYROXINE SODIUM 0.2 MG/1
TABLET ORAL
Qty: 30 TABLET | Refills: 1 | Status: SHIPPED | OUTPATIENT
Start: 2019-03-07 | End: 2019-10-08 | Stop reason: SDUPTHER

## 2019-05-02 RX ORDER — OSELTAMIVIR PHOSPHATE 75 MG/1
75 CAPSULE ORAL DAILY
Qty: 10 CAPSULE | Refills: 0 | Status: SHIPPED | OUTPATIENT
Start: 2019-05-02 | End: 2019-05-12

## 2019-10-02 DIAGNOSIS — E78.2 MIXED HYPERLIPIDEMIA: ICD-10-CM

## 2019-10-02 LAB
ALBUMIN SERPL-MCNC: 4.2 G/DL (ref 3.5–5.2)
ALP BLD-CCNC: 64 U/L (ref 40–130)
ALT SERPL-CCNC: 19 U/L (ref 5–41)
ANION GAP SERPL CALCULATED.3IONS-SCNC: 13 MMOL/L (ref 7–19)
AST SERPL-CCNC: 21 U/L (ref 5–40)
BILIRUB SERPL-MCNC: 0.9 MG/DL (ref 0.2–1.2)
BUN BLDV-MCNC: 10 MG/DL (ref 6–20)
CALCIUM SERPL-MCNC: 9 MG/DL (ref 8.6–10)
CHLORIDE BLD-SCNC: 108 MMOL/L (ref 98–111)
CHOLESTEROL, TOTAL: 152 MG/DL (ref 160–199)
CO2: 24 MMOL/L (ref 22–29)
CREAT SERPL-MCNC: 0.8 MG/DL (ref 0.5–1.2)
GFR NON-AFRICAN AMERICAN: >60
GLUCOSE BLD-MCNC: 88 MG/DL (ref 74–109)
HDLC SERPL-MCNC: 49 MG/DL (ref 55–121)
LDL CHOLESTEROL CALCULATED: 91 MG/DL
POTASSIUM SERPL-SCNC: 4.1 MMOL/L (ref 3.5–5)
SODIUM BLD-SCNC: 145 MMOL/L (ref 136–145)
TOTAL PROTEIN: 6.7 G/DL (ref 6.6–8.7)
TRIGL SERPL-MCNC: 59 MG/DL (ref 0–149)

## 2019-10-08 DIAGNOSIS — I10 HYPERTENSION, UNSPECIFIED TYPE: ICD-10-CM

## 2019-10-08 DIAGNOSIS — E78.2 MIXED HYPERLIPIDEMIA: ICD-10-CM

## 2019-10-08 DIAGNOSIS — F41.9 ANXIETY: ICD-10-CM

## 2019-10-08 DIAGNOSIS — E03.8 OTHER SPECIFIED HYPOTHYROIDISM: ICD-10-CM

## 2019-10-08 DIAGNOSIS — F34.1 DYSTHYMIA: ICD-10-CM

## 2019-10-08 DIAGNOSIS — I10 ESSENTIAL HYPERTENSION: ICD-10-CM

## 2019-10-08 RX ORDER — ATORVASTATIN CALCIUM 40 MG/1
TABLET, FILM COATED ORAL
Qty: 90 TABLET | Refills: 0 | Status: SHIPPED | OUTPATIENT
Start: 2019-10-08 | End: 2020-03-17

## 2019-10-08 RX ORDER — OLMESARTAN MEDOXOMIL 20 MG/1
20 TABLET ORAL DAILY
Qty: 90 TABLET | Refills: 0 | Status: SHIPPED | OUTPATIENT
Start: 2019-10-08 | End: 2020-02-17

## 2019-10-08 RX ORDER — BUPROPION HYDROCHLORIDE 150 MG/1
150 TABLET ORAL EVERY MORNING
Qty: 90 TABLET | Refills: 0 | Status: SHIPPED | OUTPATIENT
Start: 2019-10-08 | End: 2020-02-17

## 2019-10-08 RX ORDER — LEVOTHYROXINE SODIUM 0.03 MG/1
25 TABLET ORAL DAILY
Qty: 90 TABLET | Refills: 0 | Status: SHIPPED | OUTPATIENT
Start: 2019-10-08 | End: 2020-02-17

## 2019-10-08 RX ORDER — AMLODIPINE BESYLATE 5 MG/1
TABLET ORAL
Qty: 90 TABLET | Refills: 0 | Status: SHIPPED | OUTPATIENT
Start: 2019-10-08 | End: 2020-02-17

## 2019-10-08 RX ORDER — FLUOXETINE 10 MG/1
CAPSULE ORAL
Qty: 90 CAPSULE | Refills: 0 | Status: SHIPPED | OUTPATIENT
Start: 2019-10-08 | End: 2020-02-17

## 2019-10-08 RX ORDER — LEVOTHYROXINE SODIUM 0.2 MG/1
TABLET ORAL
Qty: 90 TABLET | Refills: 0 | Status: SHIPPED | OUTPATIENT
Start: 2019-10-08 | End: 2020-02-17

## 2020-03-04 ENCOUNTER — OFFICE VISIT (OUTPATIENT)
Dept: FAMILY MEDICINE CLINIC | Age: 45
End: 2020-03-04
Payer: COMMERCIAL

## 2020-03-04 ENCOUNTER — HOSPITAL ENCOUNTER (OUTPATIENT)
Dept: GENERAL RADIOLOGY | Age: 45
Discharge: HOME | End: 2020-03-04
Payer: COMMERCIAL

## 2020-03-04 VITALS
HEIGHT: 75 IN | BODY MASS INDEX: 33.69 KG/M2 | SYSTOLIC BLOOD PRESSURE: 122 MMHG | OXYGEN SATURATION: 99 % | TEMPERATURE: 96.7 F | HEART RATE: 68 BPM | WEIGHT: 271 LBS | RESPIRATION RATE: 20 BRPM | DIASTOLIC BLOOD PRESSURE: 72 MMHG

## 2020-03-04 DIAGNOSIS — E03.9 ACQUIRED HYPOTHYROIDISM: ICD-10-CM

## 2020-03-04 DIAGNOSIS — N50.812 LEFT TESTICULAR PAIN: ICD-10-CM

## 2020-03-04 DIAGNOSIS — E78.2 MIXED HYPERLIPIDEMIA: ICD-10-CM

## 2020-03-04 DIAGNOSIS — E55.9 VITAMIN D DEFICIENCY: ICD-10-CM

## 2020-03-04 DIAGNOSIS — I10 ESSENTIAL HYPERTENSION: ICD-10-CM

## 2020-03-04 DIAGNOSIS — R10.32 INTERMITTENT LEFT LOWER QUADRANT ABDOMINAL PAIN: ICD-10-CM

## 2020-03-04 LAB
ALBUMIN SERPL-MCNC: 4.4 G/DL (ref 3.5–5.2)
ALP BLD-CCNC: 62 U/L (ref 40–130)
ALT SERPL-CCNC: 25 U/L (ref 5–41)
ANION GAP SERPL CALCULATED.3IONS-SCNC: 13 MMOL/L (ref 7–19)
AST SERPL-CCNC: 21 U/L (ref 5–40)
BASOPHILS ABSOLUTE: 0.1 K/UL (ref 0–0.2)
BASOPHILS RELATIVE PERCENT: 1.3 % (ref 0–1)
BILIRUB SERPL-MCNC: 0.7 MG/DL (ref 0.2–1.2)
BILIRUBIN URINE: NEGATIVE
BLOOD, URINE: NEGATIVE
BUN BLDV-MCNC: 9 MG/DL (ref 6–20)
CALCIUM SERPL-MCNC: 9.3 MG/DL (ref 8.6–10)
CHLORIDE BLD-SCNC: 104 MMOL/L (ref 98–111)
CHOLESTEROL, TOTAL: 190 MG/DL (ref 160–199)
CLARITY: CLEAR
CO2: 23 MMOL/L (ref 22–29)
COLOR: YELLOW
CREAT SERPL-MCNC: 0.8 MG/DL (ref 0.5–1.2)
EOSINOPHILS ABSOLUTE: 0.3 K/UL (ref 0–0.6)
EOSINOPHILS RELATIVE PERCENT: 3.6 % (ref 0–5)
GFR NON-AFRICAN AMERICAN: >60
GLUCOSE BLD-MCNC: 91 MG/DL (ref 74–109)
GLUCOSE URINE: NEGATIVE MG/DL
HCT VFR BLD CALC: 46.3 % (ref 42–52)
HDLC SERPL-MCNC: 49 MG/DL (ref 55–121)
HEMOGLOBIN: 15.6 G/DL (ref 14–18)
IMMATURE GRANULOCYTES #: 0 K/UL
KETONES, URINE: NEGATIVE MG/DL
LDL CHOLESTEROL CALCULATED: 125 MG/DL
LEUKOCYTE ESTERASE, URINE: NEGATIVE
LYMPHOCYTES ABSOLUTE: 1.9 K/UL (ref 1.1–4.5)
LYMPHOCYTES RELATIVE PERCENT: 26.9 % (ref 20–40)
MCH RBC QN AUTO: 31.1 PG (ref 27–31)
MCHC RBC AUTO-ENTMCNC: 33.7 G/DL (ref 33–37)
MCV RBC AUTO: 92.4 FL (ref 80–94)
MONOCYTES ABSOLUTE: 0.8 K/UL (ref 0–0.9)
MONOCYTES RELATIVE PERCENT: 10.8 % (ref 0–10)
NEUTROPHILS ABSOLUTE: 4 K/UL (ref 1.5–7.5)
NEUTROPHILS RELATIVE PERCENT: 57.1 % (ref 50–65)
NITRITE, URINE: NEGATIVE
PDW BLD-RTO: 13.1 % (ref 11.5–14.5)
PH UA: 6 (ref 5–8)
PLATELET # BLD: 307 K/UL (ref 130–400)
PMV BLD AUTO: 10.3 FL (ref 9.4–12.4)
POTASSIUM REFLEX MAGNESIUM: 4.4 MMOL/L (ref 3.5–5)
PROTEIN UA: NEGATIVE MG/DL
RBC # BLD: 5.01 M/UL (ref 4.7–6.1)
SODIUM BLD-SCNC: 140 MMOL/L (ref 136–145)
SPECIFIC GRAVITY UA: 1.02 (ref 1–1.03)
T4 FREE: 1.63 NG/DL (ref 0.93–1.7)
TOTAL PROTEIN: 6.9 G/DL (ref 6.6–8.7)
TRIGL SERPL-MCNC: 82 MG/DL (ref 0–149)
TSH SERPL DL<=0.05 MIU/L-ACNC: 4.95 UIU/ML (ref 0.27–4.2)
URINE REFLEX TO CULTURE: NORMAL
UROBILINOGEN, URINE: 0.2 E.U./DL
VITAMIN D 25-HYDROXY: 23.7 NG/ML
WBC # BLD: 7 K/UL (ref 4.8–10.8)

## 2020-03-04 PROCEDURE — 76870 US EXAM SCROTUM: CPT

## 2020-03-04 PROCEDURE — 99214 OFFICE O/P EST MOD 30 MIN: CPT | Performed by: NURSE PRACTITIONER

## 2020-03-04 ASSESSMENT — PATIENT HEALTH QUESTIONNAIRE - PHQ9
1. LITTLE INTEREST OR PLEASURE IN DOING THINGS: 1
SUM OF ALL RESPONSES TO PHQ QUESTIONS 1-9: 2
SUM OF ALL RESPONSES TO PHQ9 QUESTIONS 1 & 2: 2
SUM OF ALL RESPONSES TO PHQ QUESTIONS 1-9: 2
2. FEELING DOWN, DEPRESSED OR HOPELESS: 1

## 2020-03-04 NOTE — PROGRESS NOTES
SUBJECTIVE:    Patient ID: Dorothy Garland is a51 y.o. male. Dorothy Garland is here today for Follow-up (Patient presents for follow up on left lower stomach; patient has been treated in the past for epiditimitis. Patient wants to make sure he doesn't have a mass. ) and Discuss Medications (patient states that he feels like he's drowning and medications not working.)  . HPI:   HPI     Pt states that he has had several years of intermittent h/o epiditimitis. He states that a few weeks ago he was going through customs at airport and had to be \"checked\" because of left anterior hip radiopaque area. Pt states this happened coming and going at 2 different airports. Pt is alarmed as he reports this is \"same area\" that he has felt intermittent pain in the past.   No fever, no change in urination, no changes in BM     While here today, pt is also requesting to have medications refilled and states that he is fasting to have routine lab drawn. Past Medical History:   Diagnosis Date    Hyperlipidemia     Hypertension     Hypothyroidism     Unspecified sleep apnea      Prior to Visit Medications    Medication Sig Taking? Authorizing Provider   levothyroxine (SYNTHROID) 200 MCG tablet Take 1 tablet by mouth Daily NEEDS APPT PRIOR TO FURTHER REFILLS!!! Yes DENISHA Ashton   olmesartan (BENICAR) 20 MG tablet Take 1 tablet by mouth daily NEEDS OFFICE VISIT PRIOR TO FURTHER REFILLS!!! Yes DENISHA Ashton   amLODIPine (NORVASC) 5 MG tablet Take 1 tablet by mouth daily NEEDS APPT PRIOR TO FURTHER REFILLS! Yes DENISHA [place]ston   buPROPion (WELLBUTRIN XL) 150 MG extended release tablet Take 1 tablet by mouth every morning NEEDS APPT PRIOR TO FURTHER REFILLS!!! Do not crush or break.  Yes DENISHA Ashton   FLUoxetine (PROZAC) 10 MG capsule Take 1 capsule by mouth daily NEEDS APPT PRIOR TO FURTHER REFILLS Yes DENISHA Ashton   levothyroxine (SYNTHROID) 25 MCG tablet Take 1 tablet by mouth Daily NEEDS APPT PRIOR TO Capillary refill takes less than 2 seconds. Neurological:      General: No focal deficit present. Mental Status: He is alert and oriented to person, place, and time. Psychiatric:         Mood and Affect: Mood is not anxious or depressed. Affect is not angry. Speech: Speech normal.         Behavior: Behavior normal.         Thought Content: Thought content normal.         Judgment: Judgment normal.         /72 (Site: Left Upper Arm, Position: Sitting, Cuff Size: Large Adult)   Pulse 68   Temp 96.7 °F (35.9 °C) (Temporal)   Resp 20   Ht 6' 3\" (1.905 m)   Wt 271 lb (122.9 kg)   SpO2 99%   BMI 33.87 kg/m²      ASSESSMENT:      ICD-10-CM    1. Left testicular pain N50.812 US SCROTUM AND TESTICLES     CT ABDOMEN PELVIS W WO CONTRAST Additional Contrast? Radiologist Recommendation     Urinalysis Reflex to Culture     CBC Auto Differential   2. Intermittent left lower quadrant abdominal pain R10.32 US SCROTUM AND TESTICLES     CT ABDOMEN PELVIS W WO CONTRAST Additional Contrast? Radiologist Recommendation     Urinalysis Reflex to Culture     CBC Auto Differential   3. Essential hypertension I10 CBC Auto Differential     Comprehensive Metabolic Panel w/ Reflex to MG   4. Mixed hyperlipidemia E78.2 CBC Auto Differential     Comprehensive Metabolic Panel w/ Reflex to MG     Lipid Panel   5. Acquired hypothyroidism E03.9 TSH without Reflex     T4, Free   6. Vitamin D deficiency E55.9 Vitamin D 25 Hydroxy       PLAN:    [place]: Follow-up (Patient presents for follow up on left lower stomach; patient has been treated in the past for epiditimitis. Patient wants to make sure he doesn't have a mass. ) and Discuss Medications (patient states that he feels like he's drowning and medications not working.)  Fasting lab today  U/s today if possible  CT ordered  PRN GI for consideration of Cscope. Diagnosis and orders for this visit are above.       Please note that this chart was generated using dragon dictationsoftware. Although every effort was made to ensure the accuracy of this automated transcription, some errors in transcription may have occurred.

## 2020-03-11 ENCOUNTER — HOSPITAL ENCOUNTER (OUTPATIENT)
Dept: GENERAL RADIOLOGY | Age: 45
Discharge: HOME | End: 2020-03-11
Payer: COMMERCIAL

## 2020-03-11 PROCEDURE — 74178 CT ABD&PLV WO CNTR FLWD CNTR: CPT

## 2020-03-11 PROCEDURE — 2500000003 HC RX 250 WO HCPCS: Performed by: NURSE PRACTITIONER

## 2020-03-11 PROCEDURE — 6360000004 HC RX CONTRAST MEDICATION: Performed by: NURSE PRACTITIONER

## 2020-03-11 RX ADMIN — BARIUM SULFATE 450 ML: 21 SUSPENSION ORAL at 09:13

## 2020-03-11 RX ADMIN — IOPAMIDOL 85 ML: 755 INJECTION, SOLUTION INTRAVENOUS at 09:13

## 2020-03-12 ASSESSMENT — ENCOUNTER SYMPTOMS
CONSTIPATION: 0
ABDOMINAL PAIN: 1
TROUBLE SWALLOWING: 0
ABDOMINAL DISTENTION: 0
DIARRHEA: 0

## 2020-03-13 ENCOUNTER — TELEPHONE (OUTPATIENT)
Dept: FAMILY MEDICINE CLINIC | Age: 45
End: 2020-03-13

## 2020-03-13 NOTE — RESULT ENCOUNTER NOTE
Ct is showing no acute finding in area of concern. There is incidental finding of small nodule of left adrenal gland. Now we need to do MRI of abdomen for further info. Prostate is noted to have enlargement and having irregular enhancement. I would like pt to have MRI and then we will decide if urology is needed at that time. PSA level is needed. Tiny umbilical hernia also noted--if any pain=we will seek surgical eval.    Incidental finding also of severe degenration of L5-S1 of spine. F/u pending results as needed. Entering tests now.

## 2020-03-17 RX ORDER — ATORVASTATIN CALCIUM 40 MG/1
TABLET, FILM COATED ORAL
Qty: 90 TABLET | Refills: 0 | Status: SHIPPED | OUTPATIENT
Start: 2020-03-17 | End: 2020-03-19 | Stop reason: SDUPTHER

## 2020-03-18 DIAGNOSIS — Z12.5 PROSTATE CANCER SCREENING: ICD-10-CM

## 2020-03-18 LAB — PROSTATE SPECIFIC ANTIGEN: 1.32 NG/ML (ref 0–4)

## 2020-03-19 RX ORDER — BUPROPION HYDROCHLORIDE 150 MG/1
150 TABLET ORAL EVERY MORNING
Qty: 30 TABLET | Refills: 3 | Status: SHIPPED | OUTPATIENT
Start: 2020-03-19 | End: 2020-08-27

## 2020-03-19 RX ORDER — OLMESARTAN MEDOXOMIL 20 MG/1
20 TABLET ORAL DAILY
Qty: 30 TABLET | Refills: 3 | Status: SHIPPED | OUTPATIENT
Start: 2020-03-19 | End: 2020-08-27

## 2020-03-19 RX ORDER — FLUOXETINE 10 MG/1
10 CAPSULE ORAL DAILY
Qty: 30 CAPSULE | Refills: 3 | Status: SHIPPED | OUTPATIENT
Start: 2020-03-19 | End: 2020-08-27

## 2020-03-19 RX ORDER — LEVOTHYROXINE SODIUM 0.2 MG/1
200 TABLET ORAL DAILY
Qty: 30 TABLET | Refills: 3 | Status: SHIPPED | OUTPATIENT
Start: 2020-03-19 | End: 2020-08-27

## 2020-03-19 RX ORDER — AMLODIPINE BESYLATE 5 MG/1
5 TABLET ORAL DAILY
Qty: 30 TABLET | Refills: 3 | Status: SHIPPED | OUTPATIENT
Start: 2020-03-19 | End: 2020-08-27

## 2020-03-19 RX ORDER — ATORVASTATIN CALCIUM 40 MG/1
TABLET, FILM COATED ORAL
Qty: 30 TABLET | Refills: 3 | Status: SHIPPED | OUTPATIENT
Start: 2020-03-19 | End: 2021-01-06

## 2020-03-19 RX ORDER — LEVOTHYROXINE SODIUM 0.05 MG/1
50 TABLET ORAL DAILY
Qty: 30 TABLET | Refills: 3 | Status: SHIPPED | OUTPATIENT
Start: 2020-03-19 | End: 2020-08-27

## 2020-03-25 ENCOUNTER — OFFICE VISIT (OUTPATIENT)
Dept: UROLOGY | Age: 45
End: 2020-03-25
Payer: COMMERCIAL

## 2020-03-25 VITALS
DIASTOLIC BLOOD PRESSURE: 84 MMHG | HEART RATE: 86 BPM | HEIGHT: 75 IN | TEMPERATURE: 97.5 F | BODY MASS INDEX: 34.19 KG/M2 | WEIGHT: 275 LBS | SYSTOLIC BLOOD PRESSURE: 160 MMHG

## 2020-03-25 LAB
APPEARANCE FLUID: CLEAR
BILIRUBIN, POC: NORMAL
BLOOD URINE, POC: NORMAL
CLARITY, POC: CLEAR
COLOR, POC: YELLOW
GLUCOSE URINE, POC: NORMAL
KETONES, POC: NORMAL
LEUKOCYTE EST, POC: NORMAL
NITRITE, POC: NORMAL
PH, POC: 6
PROTEIN, POC: NORMAL
SPECIFIC GRAVITY, POC: 1.02
UROBILINOGEN, POC: 0.2

## 2020-03-25 PROCEDURE — 81002 URINALYSIS NONAUTO W/O SCOPE: CPT | Performed by: PHYSICIAN ASSISTANT

## 2020-03-25 PROCEDURE — 99202 OFFICE O/P NEW SF 15 MIN: CPT | Performed by: PHYSICIAN ASSISTANT

## 2020-03-25 ASSESSMENT — ENCOUNTER SYMPTOMS
BACK PAIN: 0
COUGH: 0
SHORTNESS OF BREATH: 0
EYE DISCHARGE: 0
EYE REDNESS: 0
ABDOMINAL PAIN: 0
WHEEZING: 0
DIARRHEA: 0
CONSTIPATION: 0

## 2020-03-25 NOTE — PROGRESS NOTES
(PROZAC) 10 MG capsule Take 1 capsule by mouth daily NEEDS APPT PRIOR TO FURTHER REFILLS 30 capsule 3    atorvastatin (LIPITOR) 40 MG tablet Take 1 tablet by mouth nightly 30 tablet 3    levothyroxine (SYNTHROID) 200 MCG tablet Take 1 tablet by mouth Daily NEEDS APPT PRIOR TO FURTHER REFILLS!!! 30 tablet 3    levothyroxine (SYNTHROID) 50 MCG tablet Take 1 tablet by mouth daily 30 tablet 3    albuterol sulfate HFA (VENTOLIN HFA) 108 (90 Base) MCG/ACT inhaler Inhale 2 puffs into the lungs every 6 hours as needed for Wheezing      Cholecalciferol (VITAMIN D3) 25777 units CAPS 1 po weekly x 12wks then repeat lab 12 capsule 0    esomeprazole (NEXIUM) 40 MG delayed release capsule Take 1 capsule by mouth every morning (before breakfast) 30 capsule 2     No current facility-administered medications for this visit.         No Known Allergies    Social History     Socioeconomic History    Marital status:      Spouse name: None    Number of children: None    Years of education: None    Highest education level: None   Occupational History    None   Social Needs    Financial resource strain: None    Food insecurity     Worry: None     Inability: None    Transportation needs     Medical: None     Non-medical: None   Tobacco Use    Smoking status: Former Smoker     Last attempt to quit: 1999     Years since quittin.8    Smokeless tobacco: Never Used   Substance and Sexual Activity    Alcohol use: No    Drug use: No    Sexual activity: None   Lifestyle    Physical activity     Days per week: None     Minutes per session: None    Stress: None   Relationships    Social connections     Talks on phone: None     Gets together: None     Attends Caodaism service: None     Active member of club or organization: None     Attends meetings of clubs or organizations: None     Relationship status: None    Intimate partner violence     Fear of current or ex partner: None     Emotionally abused: None probably represent an   adrenal adenoma. If clinically warranted, further follow-up with MR   imaging of the abdomen may be obtained. Signed by Dr Rose Ingram on 3/11/2020 10:34 AM     I personally reviewed the images of the CT scan and agree with the radiologist findings and description. 1. Left testicular pain  This is chronic and intermittent in nature it is described as dull ache rather than acute pain. I suspect this is neuropathic pain from a musculoskeletal cause. Scrotal ultrasound revealed no obvious sources of pain. - POCT Urinalysis no Micro  - PSA, Diagnostic; Future    2. Benign prostatic hyperplasia with urinary hesitancy  Patient does have an enlarged prostate with digital rectal exam as well as a family history. He also has symptoms indicative of BPH however I offered him tamsulosin for now he would like to hold off he is not that bothered her concerned about the voiding symptoms at this time. I would like to repeat digital rectal exam and PSA in 4 months. I explained that the CT scan with and without contrast is not a real sensitive test for prostate cancer. His PSA was 1.32.  - PSA, Diagnostic; Future    3. Adenoma of left adrenal gland  The nodule is nonenhancing which favors a adrenal adenoma patient has MRI scheduled through his PCP which he will follow-up as scheduled for that. Orders Placed This Encounter   Procedures    PSA, Diagnostic     Standing Status:   Future     Standing Expiration Date:   3/25/2021    POCT Urinalysis no Micro     No orders of the defined types were placed in this encounter. Plan:  Follow-up in 4 months with PSA.

## 2020-05-06 ENCOUNTER — TELEPHONE (OUTPATIENT)
Dept: FAMILY MEDICINE CLINIC | Age: 45
End: 2020-05-06

## 2020-05-27 ENCOUNTER — HOSPITAL ENCOUNTER (OUTPATIENT)
Dept: MRI IMAGING | Age: 45
Discharge: HOME | End: 2020-05-27
Payer: COMMERCIAL

## 2020-05-27 PROCEDURE — 74183 MRI ABD W/O CNTR FLWD CNTR: CPT

## 2020-05-27 PROCEDURE — 6360000004 HC RX CONTRAST MEDICATION: Performed by: NURSE PRACTITIONER

## 2020-05-27 PROCEDURE — A9577 INJ MULTIHANCE: HCPCS | Performed by: NURSE PRACTITIONER

## 2020-05-27 RX ADMIN — GADOBENATE DIMEGLUMINE 20 ML: 529 INJECTION, SOLUTION INTRAVENOUS at 18:36

## 2020-05-28 RX ORDER — ERGOCALCIFEROL 1.25 MG/1
50000 CAPSULE ORAL WEEKLY
Qty: 4 CAPSULE | Refills: 3 | Status: SHIPPED | OUTPATIENT
Start: 2020-05-28 | End: 2020-10-07

## 2020-06-02 ENCOUNTER — HOSPITAL ENCOUNTER (OUTPATIENT)
Dept: GENERAL RADIOLOGY | Age: 45
Discharge: HOME | End: 2020-06-02
Payer: COMMERCIAL

## 2020-06-02 PROCEDURE — 76536 US EXAM OF HEAD AND NECK: CPT

## 2020-06-15 ENCOUNTER — TELEMEDICINE (OUTPATIENT)
Dept: FAMILY MEDICINE CLINIC | Age: 45
End: 2020-06-15
Payer: COMMERCIAL

## 2020-06-15 PROCEDURE — 99213 OFFICE O/P EST LOW 20 MIN: CPT | Performed by: NURSE PRACTITIONER

## 2020-06-15 ASSESSMENT — ENCOUNTER SYMPTOMS: TROUBLE SWALLOWING: 0

## 2020-06-15 NOTE — PROGRESS NOTES
6/15/2020    TELEHEALTH EVALUATION -- Audio/Visual (During IVXRS-61 public health emergency)    Chief Complaint   Patient presents with    Other     test result discussion           Juli Ilene (:  1975) has requested an audio/video evaluation for the following concern(s):  HPI:  Pt is participating in video visit to discuss recent tests. Pt has seen urology in regard to prostate findings on CT. Pt will f/u with urology. Pt does have finding of adenoma. There is no indication of this being an atypical adenoma. There is also a finding of thyroid nodules. Pt is recalling having seen Dr. Jalen Weiss for eval of thyroid in years passed. Those records are not available at this time. Narrative   EXAMINATION:  MRI ABDOMEN WITHOUT AND WITH CONTRAST  2020 7:31 AM   HISTORY: Indeterminate left adrenal lesion. COMPARISON: CT on 3/11/2020. TECHNIQUE: Multiplanar imaging was performed in a high-field magnet   without and with contrast.   LIVER AND SPLEEN: The liver and spleen are normal in size. No focal   liver abnormality is seen. There is a subcapsular enhancing lesion in   the spleen measuring 1 cm, likely a tiny hemangioma. There has been   prior cholecystectomy. There is no biliary ductal dilatation. PANCREAS: There is no pancreatic mass or inflammatory change. ADRENAL GLANDS: The right adrenal gland is normal. There is a 1.8 cm   left adrenal adenoma with signal dropout on the out of phase imaging   sequence. KIDNEYS: There are small renal cysts on the right side. The largest   measures 9 mm. A total of 5 small lesions are identified. Some are too   small to fully characterize. OTHER: No bowel abnormality is appreciated. No lymphadenopathy is   seen.       Impression   1. There is a 1.8 cm left adrenal adenoma. 2. Small renal cysts on the right side. Some of the lesions are too   small to fully characterize. The largest measures only 9 mm.    3. There is a 1 cm probable splenic

## 2020-06-20 ENCOUNTER — APPOINTMENT (OUTPATIENT)
Dept: GENERAL RADIOLOGY | Age: 45
End: 2020-06-20
Payer: COMMERCIAL

## 2020-06-20 ENCOUNTER — HOSPITAL ENCOUNTER (EMERGENCY)
Age: 45
Discharge: HOME | End: 2020-06-20
Payer: COMMERCIAL

## 2020-06-20 VITALS
HEIGHT: 75 IN | TEMPERATURE: 97.8 F | DIASTOLIC BLOOD PRESSURE: 67 MMHG | OXYGEN SATURATION: 94 % | BODY MASS INDEX: 34.19 KG/M2 | WEIGHT: 275 LBS | HEART RATE: 75 BPM | SYSTOLIC BLOOD PRESSURE: 126 MMHG | RESPIRATION RATE: 20 BRPM

## 2020-06-20 LAB
ALBUMIN SERPL-MCNC: 4.4 G/DL (ref 3.5–5.2)
ALP BLD-CCNC: 74 U/L (ref 40–130)
ALT SERPL-CCNC: 35 U/L (ref 5–41)
ANION GAP SERPL CALCULATED.3IONS-SCNC: 11 MMOL/L (ref 7–19)
AST SERPL-CCNC: 23 U/L (ref 5–40)
BASOPHILS ABSOLUTE: 0.1 K/UL (ref 0–0.2)
BASOPHILS RELATIVE PERCENT: 1 % (ref 0–1)
BILIRUB SERPL-MCNC: 0.4 MG/DL (ref 0.2–1.2)
BUN BLDV-MCNC: 7 MG/DL (ref 6–20)
CALCIUM SERPL-MCNC: 9 MG/DL (ref 8.6–10)
CHLORIDE BLD-SCNC: 102 MMOL/L (ref 98–111)
CO2: 24 MMOL/L (ref 22–29)
CREAT SERPL-MCNC: 0.7 MG/DL (ref 0.5–1.2)
EOSINOPHILS ABSOLUTE: 0.3 K/UL (ref 0–0.6)
EOSINOPHILS RELATIVE PERCENT: 3.3 % (ref 0–5)
GFR NON-AFRICAN AMERICAN: >60
GLUCOSE BLD-MCNC: 134 MG/DL (ref 74–109)
HCT VFR BLD CALC: 43.7 % (ref 42–52)
HEMOGLOBIN: 15.1 G/DL (ref 14–18)
IMMATURE GRANULOCYTES #: 0 K/UL
LYMPHOCYTES ABSOLUTE: 2.7 K/UL (ref 1.1–4.5)
LYMPHOCYTES RELATIVE PERCENT: 28.4 % (ref 20–40)
MCH RBC QN AUTO: 31.6 PG (ref 27–31)
MCHC RBC AUTO-ENTMCNC: 34.6 G/DL (ref 33–37)
MCV RBC AUTO: 91.4 FL (ref 80–94)
MONOCYTES ABSOLUTE: 1.2 K/UL (ref 0–0.9)
MONOCYTES RELATIVE PERCENT: 12.3 % (ref 0–10)
NEUTROPHILS ABSOLUTE: 5.1 K/UL (ref 1.5–7.5)
NEUTROPHILS RELATIVE PERCENT: 54.6 % (ref 50–65)
PDW BLD-RTO: 12.9 % (ref 11.5–14.5)
PLATELET # BLD: 327 K/UL (ref 130–400)
PMV BLD AUTO: 9.6 FL (ref 9.4–12.4)
POTASSIUM REFLEX MAGNESIUM: 3.7 MMOL/L (ref 3.5–5)
RBC # BLD: 4.78 M/UL (ref 4.7–6.1)
SODIUM BLD-SCNC: 137 MMOL/L (ref 136–145)
TOTAL PROTEIN: 6.6 G/DL (ref 6.6–8.7)
TROPONIN: <0.01 NG/ML (ref 0–0.03)
TROPONIN: <0.01 NG/ML (ref 0–0.03)
WBC # BLD: 9.4 K/UL (ref 4.8–10.8)

## 2020-06-20 PROCEDURE — 93005 ELECTROCARDIOGRAM TRACING: CPT | Performed by: NURSE PRACTITIONER

## 2020-06-20 PROCEDURE — 71045 X-RAY EXAM CHEST 1 VIEW: CPT

## 2020-06-20 PROCEDURE — 6370000000 HC RX 637 (ALT 250 FOR IP): Performed by: NURSE PRACTITIONER

## 2020-06-20 PROCEDURE — 80053 COMPREHEN METABOLIC PANEL: CPT

## 2020-06-20 PROCEDURE — 36415 COLL VENOUS BLD VENIPUNCTURE: CPT

## 2020-06-20 PROCEDURE — 85025 COMPLETE CBC W/AUTO DIFF WBC: CPT

## 2020-06-20 PROCEDURE — 99999 PR OFFICE/OUTPT VISIT,PROCEDURE ONLY: CPT | Performed by: NURSE PRACTITIONER

## 2020-06-20 PROCEDURE — 84484 ASSAY OF TROPONIN QUANT: CPT

## 2020-06-20 PROCEDURE — 99285 EMERGENCY DEPT VISIT HI MDM: CPT

## 2020-06-20 RX ORDER — NITROGLYCERIN 0.4 MG/1
0.4 TABLET SUBLINGUAL EVERY 5 MIN PRN
Status: DISCONTINUED | OUTPATIENT
Start: 2020-06-20 | End: 2020-06-20 | Stop reason: HOSPADM

## 2020-06-20 RX ORDER — ASPIRIN 81 MG/1
324 TABLET, CHEWABLE ORAL ONCE
Status: COMPLETED | OUTPATIENT
Start: 2020-06-20 | End: 2020-06-20

## 2020-06-20 RX ADMIN — ASPIRIN 324 MG: 81 TABLET, CHEWABLE ORAL at 17:41

## 2020-06-20 RX ADMIN — NITROGLYCERIN 0.4 MG: 0.4 TABLET, ORALLY DISINTEGRATING SUBLINGUAL at 17:42

## 2020-06-20 ASSESSMENT — ENCOUNTER SYMPTOMS
CONSTIPATION: 0
VOMITING: 0
SHORTNESS OF BREATH: 1
NAUSEA: 0
COUGH: 0
SORE THROAT: 0
TROUBLE SWALLOWING: 0
ABDOMINAL PAIN: 0
DIARRHEA: 0
RHINORRHEA: 0

## 2020-06-20 ASSESSMENT — HEART SCORE: ECG: 0

## 2020-06-20 NOTE — ED PROVIDER NOTES
[facility]  eMERGENCY dEPARTMENT eNCOUnter      Pt Name: Radu Martin  MRN: [id number]  [place]am 1975  Date of evaluation: 6/20/2020  Provider: DENISHA Marcos    CHIEF COMPLAINT       Chief Complaint   Patient presents with    Chest Pain     L upper cp radiating down L arm with sob, nausea, and dizziness. HISTORY OF PRESENT ILLNESS   (Location/Symptom, Timing/Onset,Context/Setting, Quality, Duration, Modifying Factors, Severity)  Note limiting factors. Radu Martin is a 50 y.o. male who presents to the emergency department with complaints of left sided chest pain that radiates into left neck, shoulder and arm. Pt states he wasn't really doing anything when started. Started hurting as an ache in the shoulder and progressed into neck and chest. He decided to come in for evaluation. He states he has been sweaty, mildly SOA, had some nausea with the pain. He reportedly had normal stress testing a few years ago. HE has not taken anything for his symptoms. Nothing makes the pain worse or better. HPI    NursingNotes were reviewed. REVIEW OF SYSTEMS    (2-9 systems for level 4, 10 or more for level 5)     Review of Systems   Constitutional: Negative for activity change, appetite change, fatigue, fever and unexpected weight change. HENT: Negative for ear pain, rhinorrhea, sore throat and trouble swallowing. Eyes: Negative for visual disturbance. Respiratory: Positive for shortness of breath. Negative for cough. Cardiovascular: Positive for chest pain. Negative for palpitations and leg swelling. Gastrointestinal: Negative for abdominal pain, constipation, diarrhea, nausea and vomiting. Genitourinary: Negative for flank pain. Musculoskeletal: Negative for arthralgias, myalgias, neck pain and neck stiffness. Neurological: Negative for headaches. Psychiatric/Behavioral: Negative for decreased concentration and sleep disturbance.  The patient is not SOCIAL HISTORY       Social History     Socioeconomic History    Marital status:      Spouse name: None    Number of children: None    Years of education: None    Highest education level: None   Occupational History    None   Social Needs    Financial resource strain: None    Food insecurity     Worry: None     Inability: None    Transportation needs     Medical: None     Non-medical: None   Tobacco Use    Smoking status: Former Smoker     Last attempt to quit: 1999     Years since quittin.0    Smokeless tobacco: Never Used   Substance and Sexual Activity    Alcohol use: No    Drug use: No    Sexual activity: None   Lifestyle    Physical activity     Days per week: None     Minutes per session: None    Stress: None   Relationships    Social connections     Talks on phone: None     Gets together: None     Attends Rastafari service: None     Active member of club or organization: None     Attends meetings of clubs or organizations: None     Relationship status: None    Intimate partner violence     Fear of current or ex partner: None     Emotionally abused: None     Physically abused: None     Forced sexual activity: None   Other Topics Concern    None   Social History Narrative    None       SCREENINGS      Heart Score for chest pain patients  History: Slightly Suspicious  ECG: Normal  Patient Age: > 39 and < 65 years  *Risk factors for Atherosclerotic disease: Hypertension, Obesity  Risk Factors: > 3 Risk factors or history of atherosclerotic disease*  Troponin: < 1X normal limit  Heart Score Total: 3      PHYSICAL EXAM    (up to 7 for level 4, 8 or more for level 5)     ED Triage Vitals [20 1719]   BP Temp Temp src Pulse Resp SpO2 Height Weight   (!) 170/84 98 °F (36.7 °C) -- 98 18 94 % 6' 3\" (1.905 m) 275 lb (124.7 kg)       Physical Exam  Vitals signs reviewed. Constitutional:       Appearance: Normal appearance. HENT:      Head: Normocephalic and atraumatic.

## 2020-06-23 LAB
EKG P AXIS: 46 DEGREES
EKG P AXIS: 56 DEGREES
EKG P-R INTERVAL: 148 MS
EKG P-R INTERVAL: 162 MS
EKG Q-T INTERVAL: 348 MS
EKG Q-T INTERVAL: 394 MS
EKG QRS DURATION: 108 MS
EKG QRS DURATION: 108 MS
EKG QTC CALCULATION (BAZETT): 395 MS
EKG QTC CALCULATION (BAZETT): 401 MS
EKG T AXIS: 18 DEGREES
EKG T AXIS: 6 DEGREES

## 2020-06-23 PROCEDURE — 93010 ELECTROCARDIOGRAM REPORT: CPT | Performed by: INTERNAL MEDICINE

## 2020-06-24 ENCOUNTER — VIRTUAL VISIT (OUTPATIENT)
Dept: FAMILY MEDICINE CLINIC | Age: 45
End: 2020-06-24
Payer: COMMERCIAL

## 2020-06-24 PROCEDURE — 99213 OFFICE O/P EST LOW 20 MIN: CPT | Performed by: NURSE PRACTITIONER

## 2020-06-27 ASSESSMENT — ENCOUNTER SYMPTOMS
NAUSEA: 0
COUGH: 0
SHORTNESS OF BREATH: 0
VOMITING: 0

## 2020-07-01 ENCOUNTER — PATIENT MESSAGE (OUTPATIENT)
Dept: FAMILY MEDICINE CLINIC | Age: 45
End: 2020-07-01

## 2020-07-01 NOTE — TELEPHONE ENCOUNTER
From: Ese Ma  To: DENISHA Vergara  Sent: 7/1/2020 4:18 PM CDT  Subject: Non-Urgent Medical Question    Kj Farmer just called and gave me my appointment information for the endocrinologist I will be seeing in [facility] on the 28th. I didn't think to ask her, but should I get any blood work done before going down there? I think it's been a couple months since we increased my medicine. Didn't know if that would be helpful or not. Thanks, hope you are well.

## 2020-07-24 ENCOUNTER — HOSPITAL ENCOUNTER (OUTPATIENT)
Dept: NON INVASIVE DIAGNOSTICS | Age: 45
Discharge: HOME | End: 2020-07-24
Payer: COMMERCIAL

## 2020-07-24 DIAGNOSIS — E55.9 VITAMIN D DEFICIENCY: ICD-10-CM

## 2020-07-24 DIAGNOSIS — N50.812 LEFT TESTICULAR PAIN: ICD-10-CM

## 2020-07-24 DIAGNOSIS — N40.1 BENIGN PROSTATIC HYPERPLASIA WITH URINARY HESITANCY: ICD-10-CM

## 2020-07-24 DIAGNOSIS — D35.00 ADRENAL ADENOMA, UNSPECIFIED LATERALITY: ICD-10-CM

## 2020-07-24 DIAGNOSIS — R39.11 BENIGN PROSTATIC HYPERPLASIA WITH URINARY HESITANCY: ICD-10-CM

## 2020-07-24 DIAGNOSIS — E03.9 ACQUIRED HYPOTHYROIDISM: ICD-10-CM

## 2020-07-24 LAB
CORTISOL TOTAL: 6.3 UG/DL
LV EF: 50 %
LVEF MODALITY: NORMAL
PROSTATE SPECIFIC ANTIGEN: 1.53 NG/ML (ref 0–4)
T4 FREE: 1.75 NG/DL (ref 0.93–1.7)
TSH SERPL DL<=0.05 MIU/L-ACNC: 3.55 UIU/ML (ref 0.27–4.2)
VITAMIN D 25-HYDROXY: 29.5 NG/ML

## 2020-07-24 PROCEDURE — C8928 TTE W OR W/O FOL W/CON,STRES: HCPCS

## 2020-07-24 PROCEDURE — 6360000004 HC RX CONTRAST MEDICATION: Performed by: NURSE PRACTITIONER

## 2020-07-24 RX ORDER — SODIUM CHLORIDE 0.9 % (FLUSH) 0.9 %
10 SYRINGE (ML) INJECTION PRN
Status: DISCONTINUED | OUTPATIENT
Start: 2020-07-24 | End: 2020-07-25 | Stop reason: HOSPADM

## 2020-07-24 RX ADMIN — PERFLUTREN 1.65 MG: 6.52 INJECTION, SUSPENSION INTRAVENOUS at 09:00

## 2020-07-26 LAB — THYROID PEROXIDASE (TPO) ABS: 175.6 IU/ML (ref 0–9)

## 2020-08-27 RX ORDER — BUPROPION HYDROCHLORIDE 150 MG/1
TABLET ORAL
Qty: 30 TABLET | Refills: 0 | Status: SHIPPED | OUTPATIENT
Start: 2020-08-27 | End: 2020-10-07

## 2020-08-27 RX ORDER — AMLODIPINE BESYLATE 5 MG/1
TABLET ORAL
Qty: 30 TABLET | Refills: 0 | Status: SHIPPED | OUTPATIENT
Start: 2020-08-27 | End: 2020-10-07

## 2020-08-27 RX ORDER — FLUOXETINE 10 MG/1
CAPSULE ORAL
Qty: 30 CAPSULE | Refills: 0 | Status: SHIPPED | OUTPATIENT
Start: 2020-08-27 | End: 2020-10-07

## 2020-08-27 RX ORDER — LEVOTHYROXINE SODIUM 200 UG/1
TABLET ORAL
Qty: 30 TABLET | Refills: 0 | Status: SHIPPED | OUTPATIENT
Start: 2020-08-27 | End: 2020-10-07

## 2020-08-27 RX ORDER — LEVOTHYROXINE SODIUM 50 UG/1
TABLET ORAL
Qty: 30 TABLET | Refills: 0 | Status: SHIPPED | OUTPATIENT
Start: 2020-08-27 | End: 2020-10-07

## 2020-08-27 RX ORDER — OLMESARTAN MEDOXOMIL 20 MG/1
TABLET ORAL
Qty: 30 TABLET | Refills: 0 | Status: SHIPPED | OUTPATIENT
Start: 2020-08-27 | End: 2020-10-07

## 2020-10-07 RX ORDER — AMLODIPINE BESYLATE 5 MG/1
TABLET ORAL
Qty: 30 TABLET | Refills: 0 | Status: SHIPPED | OUTPATIENT
Start: 2020-10-07 | End: 2020-11-18

## 2020-10-07 RX ORDER — OLMESARTAN MEDOXOMIL 20 MG/1
TABLET ORAL
Qty: 30 TABLET | Refills: 0 | Status: SHIPPED | OUTPATIENT
Start: 2020-10-07 | End: 2020-11-18

## 2020-10-07 RX ORDER — FLUOXETINE 10 MG/1
CAPSULE ORAL
Qty: 30 CAPSULE | Refills: 0 | Status: SHIPPED | OUTPATIENT
Start: 2020-10-07 | End: 2020-11-18

## 2020-10-07 RX ORDER — LEVOTHYROXINE SODIUM 200 UG/1
TABLET ORAL
Qty: 30 TABLET | Refills: 0 | Status: SHIPPED | OUTPATIENT
Start: 2020-10-07 | End: 2020-11-18

## 2020-10-07 RX ORDER — BUPROPION HYDROCHLORIDE 150 MG/1
TABLET ORAL
Qty: 30 TABLET | Refills: 0 | Status: SHIPPED | OUTPATIENT
Start: 2020-10-07 | End: 2020-11-18

## 2020-10-07 RX ORDER — LEVOTHYROXINE SODIUM 50 UG/1
TABLET ORAL
Qty: 30 TABLET | Refills: 0 | Status: SHIPPED | OUTPATIENT
Start: 2020-10-07 | End: 2020-11-18

## 2020-10-07 RX ORDER — ERGOCALCIFEROL 1.25 MG/1
50000 CAPSULE ORAL WEEKLY
Qty: 4 CAPSULE | Refills: 0 | Status: SHIPPED | OUTPATIENT
Start: 2020-10-07 | End: 2020-11-18

## 2020-10-16 ENCOUNTER — OFFICE VISIT (OUTPATIENT)
Dept: UROLOGY | Age: 45
End: 2020-10-16
Payer: COMMERCIAL

## 2020-10-16 VITALS — WEIGHT: 286 LBS | HEIGHT: 75 IN | TEMPERATURE: 98 F | BODY MASS INDEX: 35.56 KG/M2

## 2020-10-16 PROBLEM — R35.0 BENIGN PROSTATIC HYPERPLASIA WITH URINARY FREQUENCY: Status: ACTIVE | Noted: 2020-10-16

## 2020-10-16 PROBLEM — N40.1 BENIGN PROSTATIC HYPERPLASIA WITH URINARY FREQUENCY: Status: ACTIVE | Noted: 2020-10-16

## 2020-10-16 LAB
BILIRUBIN, POC: NORMAL
BLOOD URINE, POC: NORMAL
CLARITY, POC: CLEAR
COLOR, POC: YELLOW
GLUCOSE URINE, POC: NORMAL
KETONES, POC: NORMAL
LEUKOCYTE EST, POC: NORMAL
NITRITE, POC: NORMAL
PH, POC: 5.5
PROTEIN, POC: NORMAL
SPECIFIC GRAVITY, POC: 1.02
UROBILINOGEN, POC: 0.2

## 2020-10-16 PROCEDURE — 81003 URINALYSIS AUTO W/O SCOPE: CPT | Performed by: PHYSICIAN ASSISTANT

## 2020-10-16 PROCEDURE — 99213 OFFICE O/P EST LOW 20 MIN: CPT | Performed by: PHYSICIAN ASSISTANT

## 2020-10-16 ASSESSMENT — ENCOUNTER SYMPTOMS
RESPIRATORY NEGATIVE: 1
EYES NEGATIVE: 1
GASTROINTESTINAL NEGATIVE: 1

## 2020-10-16 NOTE — PROGRESS NOTES
Andrew Arzate is a 50 y.o. male who presents today   Chief Complaint   Patient presents with    Follow-up     I am here for my 4 month follow up. I had my PSA done back in July 2020. HPI:  Patient is a 77-year-old gentleman here for 4-month follow-up he got a PSA July 2020. Patient has history of BPH some mild symptoms but he is not on any urologic medications at this time. His most recent PSA in July 2020 was 1.53 PSA done 03/18/2020 was 1.32.  CT scan done 03/11/2020 revealed prostate enlargement with calcifications and irregular enhancement. The rectal exam has been benign. He does not have any gross hematuria or any worsening symptoms.   No family history of prostate cancer      Past Medical History:   Diagnosis Date    Hyperlipidemia     Hypertension     Hypothyroidism     Kidney stone     Multiple thyroid nodules 06/2020    Unspecified sleep apnea        Past Surgical History:   Procedure Laterality Date    APPENDECTOMY      CHOLECYSTECTOMY  2015    LITHOTRIPSY      SHOULDER DEBRIDEMENT Right 10/2013    WRIST SURGERY Left        Current Outpatient Medications   Medication Sig Dispense Refill    amLODIPine (NORVASC) 5 MG tablet Take 1 tablet by mouth once daily 30 tablet 0    EUTHYROX 200 MCG tablet Take 1 tablet by mouth once daily 30 tablet 0    FLUoxetine (PROZAC) 10 MG capsule Take 1 capsule by mouth once daily 30 capsule 0    EUTHYROX 50 MCG tablet Take 1 tablet by mouth once daily 30 tablet 0    buPROPion (WELLBUTRIN XL) 150 MG extended release tablet TAKE 1 TABLET BY MOUTH ONCE DAILY IN THE MORNING *DO  NOT  CRUSH  OR  BREAK* 30 tablet 0    olmesartan (BENICAR) 20 MG tablet Take 1 tablet by mouth once daily 30 tablet 0    vitamin D (ERGOCALCIFEROL) 1.25 MG (21527 UT) CAPS capsule Take 1 capsule by mouth once a week 4 capsule 0    atorvastatin (LIPITOR) 40 MG tablet Take 1 tablet by mouth nightly 30 tablet 3    albuterol sulfate HFA (VENTOLIN HFA) 108 (90 Base) MCG/ACT inhaler Inhale 2 puffs into the lungs every 6 hours as needed for Wheezing      esomeprazole (NEXIUM) 40 MG delayed release capsule Take 1 capsule by mouth every morning (before breakfast) 30 capsule 2     No current facility-administered medications for this visit. No Known Allergies    Social History     Socioeconomic History    Marital status:      Spouse name: None    Number of children: None    Years of education: None    Highest education level: None   Occupational History    None   Social Needs    Financial resource strain: None    Food insecurity     Worry: None     Inability: None    Transportation needs     Medical: None     Non-medical: None   Tobacco Use    Smoking status: Former Smoker     Last attempt to quit: 1999     Years since quittin.3    Smokeless tobacco: Never Used   Substance and Sexual Activity    Alcohol use: No    Drug use: No    Sexual activity: None   Lifestyle    Physical activity     Days per week: None     Minutes per session: None    Stress: None   Relationships    Social connections     Talks on phone: None     Gets together: None     Attends Hoahaoism service: None     Active member of club or organization: None     Attends meetings of clubs or organizations: None     Relationship status: None    Intimate partner violence     Fear of current or ex partner: None     Emotionally abused: None     Physically abused: None     Forced sexual activity: None   Other Topics Concern    None   Social History Narrative    None       Family History   Problem Relation Age of Onset    Asthma Mother     Diabetes Maternal Grandmother     Asthma Maternal Grandmother     Coronary Art Dis Father        REVIEW OF SYSTEMS:  Review of Systems   Constitutional: Negative. HENT: Negative. Eyes: Negative. Respiratory: Negative. Cardiovascular: Negative. Gastrointestinal: Negative. Genitourinary: Negative. Musculoskeletal: Negative.     Skin: Negative. PHYSICAL EXAM:  Temp 98 °F (36.7 °C) (Temporal)   Ht 6' 3\" (1.905 m)   Wt 286 lb (129.7 kg)   BMI 35.75 kg/m²   Physical Exam  Vitals signs reviewed. Constitutional:       Appearance: Normal appearance. HENT:      Head: Normocephalic and atraumatic. Right Ear: External ear normal.      Left Ear: External ear normal.      Nose: No congestion. Eyes:      Conjunctiva/sclera: Conjunctivae normal.   Neck:      Comments: I observed no obvious neck masses  Pulmonary:      Effort: Pulmonary effort is normal.   Genitourinary:     Prostate: Enlarged. Not tender and no nodules present. Rectum: Normal.   Musculoskeletal:      Comments: Patient ambulates without difficulty   Skin:     Coloration: Skin is not pale. Neurological:      General: No focal deficit present. Mental Status: He is alert and oriented to person, place, and time. Psychiatric:         Mood and Affect: Mood normal.             DATA:  U/A:    Lab Results   Component Value Date    NITRITE neg 10/16/2020    COLORU yellow 10/16/2020    COLORU YELLOW 03/04/2020    PROTEINU neg 10/16/2020    PROTEINU Negative 03/04/2020    PHUR 5.5 10/16/2020    PHUR 6.0 03/04/2020    CLARITYU clear 10/16/2020    CLARITYU Clear 03/04/2020    SPECGRAV 1.025 10/16/2020    SPECGRAV 1.019 03/04/2020    LEUKOCYTESUR neg 10/16/2020    LEUKOCYTESUR Negative 03/04/2020    UROBILINOGEN 0.2 03/04/2020    BILIRUBINUR neg 10/16/2020    BLOODU neg 10/16/2020    BLOODU Negative 03/04/2020    GLUCOSEU neg 10/16/2020    GLUCOSEU Negative 03/04/2020                 1. Benign prostatic hyperplasia with urinary frequency  Does have a large prostate per digital rectal exam however he has minimal voiding complaints or symptoms he is not on any urologic medications. 2. Increased prostate specific antigen (PSA) velocity  Continue to monitor his PSA digital rectal exam was benign today it was benign last visit.   Follow-up 4 months with PSA to see  Mari Hathaway  - PSA, Diagnostic; Future      Orders Placed This Encounter   Procedures    PSA, Diagnostic     Standing Status:   Future     Standing Expiration Date:   10/16/2021    POCT Urinalysis No Micro (Auto)     No orders of the defined types were placed in this encounter. Plan:  Follow-up 4 months with PSA prior.

## 2021-01-04 DIAGNOSIS — E78.2 MIXED HYPERLIPIDEMIA: ICD-10-CM

## 2021-01-06 RX ORDER — ATORVASTATIN CALCIUM 40 MG/1
TABLET, FILM COATED ORAL
Qty: 90 TABLET | Refills: 0 | Status: SHIPPED | OUTPATIENT
Start: 2021-01-06 | End: 2021-05-07

## 2021-02-18 ENCOUNTER — TELEPHONE (OUTPATIENT)
Dept: FAMILY MEDICINE CLINIC | Age: 46
End: 2021-02-18

## 2021-02-18 DIAGNOSIS — F41.9 ANXIETY: ICD-10-CM

## 2021-02-18 DIAGNOSIS — I10 ESSENTIAL HYPERTENSION: ICD-10-CM

## 2021-02-18 DIAGNOSIS — E03.8 OTHER SPECIFIED HYPOTHYROIDISM: ICD-10-CM

## 2021-02-18 DIAGNOSIS — E78.2 MIXED HYPERLIPIDEMIA: Primary | ICD-10-CM

## 2021-02-18 DIAGNOSIS — I10 HYPERTENSION, UNSPECIFIED TYPE: ICD-10-CM

## 2021-02-18 DIAGNOSIS — F34.1 DYSTHYMIA: ICD-10-CM

## 2021-02-18 DIAGNOSIS — E55.9 VITAMIN D DEFICIENCY: ICD-10-CM

## 2021-02-18 RX ORDER — LEVOTHYROXINE SODIUM 50 UG/1
TABLET ORAL
Qty: 90 TABLET | Refills: 0 | Status: SHIPPED | OUTPATIENT
Start: 2021-02-18 | End: 2021-06-14 | Stop reason: SDUPTHER

## 2021-02-18 RX ORDER — ERGOCALCIFEROL 1.25 MG/1
50000 CAPSULE ORAL WEEKLY
Qty: 12 CAPSULE | Refills: 0 | Status: SHIPPED | OUTPATIENT
Start: 2021-02-18 | End: 2021-06-14 | Stop reason: SDUPTHER

## 2021-02-18 RX ORDER — LEVOTHYROXINE SODIUM 200 UG/1
TABLET ORAL
Qty: 90 TABLET | Refills: 0 | Status: SHIPPED | OUTPATIENT
Start: 2021-02-18 | End: 2021-06-14 | Stop reason: SDUPTHER

## 2021-02-18 RX ORDER — FLUOXETINE 10 MG/1
CAPSULE ORAL
Qty: 90 CAPSULE | Refills: 0 | Status: SHIPPED | OUTPATIENT
Start: 2021-02-18 | End: 2021-06-14 | Stop reason: SDUPTHER

## 2021-02-18 RX ORDER — AMLODIPINE BESYLATE 5 MG/1
TABLET ORAL
Qty: 90 TABLET | Refills: 0 | Status: SHIPPED | OUTPATIENT
Start: 2021-02-18 | End: 2021-06-14 | Stop reason: SDUPTHER

## 2021-02-18 RX ORDER — BUPROPION HYDROCHLORIDE 150 MG/1
TABLET ORAL
Qty: 90 TABLET | Refills: 0 | Status: SHIPPED | OUTPATIENT
Start: 2021-02-18 | End: 2021-06-14 | Stop reason: SDUPTHER

## 2021-02-18 RX ORDER — OLMESARTAN MEDOXOMIL 20 MG/1
TABLET ORAL
Qty: 90 TABLET | Refills: 0 | Status: SHIPPED | OUTPATIENT
Start: 2021-02-18 | End: 2021-06-14 | Stop reason: SDUPTHER

## 2021-02-18 NOTE — TELEPHONE ENCOUNTER
I have approved meds, but I have also entered fasting lab. If he is continuing to see endocrinology, he may wish to tell lab techs not to draw the thyroid portion of lab.

## 2021-02-22 DIAGNOSIS — R97.20 INCREASED PROSTATE SPECIFIC ANTIGEN (PSA) VELOCITY: ICD-10-CM

## 2021-02-22 LAB — PROSTATE SPECIFIC ANTIGEN: 2.16 NG/ML (ref 0–4)

## 2021-02-23 ENCOUNTER — TELEPHONE (OUTPATIENT)
Dept: UROLOGY | Age: 46
End: 2021-02-23

## 2021-02-23 ENCOUNTER — TELEPHONE (OUTPATIENT)
Dept: OBGYN CLINIC | Age: 46
End: 2021-02-23

## 2021-02-23 NOTE — TELEPHONE ENCOUNTER
Left VM with patient letting him know Dr. Archie Schofield was going to be out of the office on the day of his appointment and that I moved it to 2:30 with DENISHA Ventura and to call to reschedule if this does not work.

## 2021-03-29 ENCOUNTER — OFFICE VISIT (OUTPATIENT)
Dept: UROLOGY | Age: 46
End: 2021-03-29
Payer: COMMERCIAL

## 2021-03-29 VITALS
WEIGHT: 298.2 LBS | DIASTOLIC BLOOD PRESSURE: 84 MMHG | HEIGHT: 75 IN | BODY MASS INDEX: 37.08 KG/M2 | SYSTOLIC BLOOD PRESSURE: 148 MMHG

## 2021-03-29 DIAGNOSIS — R39.12 BENIGN PROSTATIC HYPERPLASIA WITH WEAK URINARY STREAM: Primary | ICD-10-CM

## 2021-03-29 DIAGNOSIS — N40.1 BENIGN PROSTATIC HYPERPLASIA WITH WEAK URINARY STREAM: Primary | ICD-10-CM

## 2021-03-29 LAB
APPEARANCE FLUID: CLEAR
BILIRUBIN, POC: NORMAL
BLOOD URINE, POC: NORMAL
CLARITY, POC: CLEAR
COLOR, POC: YELLOW
GLUCOSE URINE, POC: NORMAL
KETONES, POC: NORMAL
LEUKOCYTE EST, POC: NORMAL
NITRITE, POC: NORMAL
PH, POC: 5.5
PROTEIN, POC: NORMAL
SPECIFIC GRAVITY, POC: 1.02
UROBILINOGEN, POC: 0.2

## 2021-03-29 PROCEDURE — 81002 URINALYSIS NONAUTO W/O SCOPE: CPT | Performed by: UROLOGY

## 2021-03-29 PROCEDURE — 99214 OFFICE O/P EST MOD 30 MIN: CPT | Performed by: UROLOGY

## 2021-03-29 ASSESSMENT — ENCOUNTER SYMPTOMS
EYE REDNESS: 0
VOMITING: 0
NAUSEA: 0
EYE DISCHARGE: 0
DIARRHEA: 0
ABDOMINAL DISTENTION: 0
SHORTNESS OF BREATH: 0
COUGH: 0
BACK PAIN: 0
CONSTIPATION: 0
ABDOMINAL PAIN: 0
TROUBLE SWALLOWING: 0

## 2021-03-29 NOTE — PROGRESS NOTES
Marleny Ortega is a 50 y.o. male who presents today   Chief Complaint   Patient presents with    Follow-up     I am here today for a follow up for BPH     Elevated PSA:  Patient is here today for history of an elevated PSA. His last several PSA values are as follows:  Lab Results   Component Value Date    PSA 2.16 02/22/2021    PSA 1.53 07/24/2020    PSA 1.32 03/18/2020     Overall the PSA level is: increased from last year but still below his age-adjusted cut of 2.5  Recent history of urinary tract infection/prostatitis? no  Previous prostate biopsy? no  Lower urinary tract symptoms: urgency, frequency, decreased urinary stream, nocturia, 1 times per night and intermittency, straining. He rates his symptoms as severe.   His AUA symptom score is 20      Past Medical History:   Diagnosis Date    Hypercholesterolemia     Hyperlipidemia     Hypertension     Hypothyroidism     Kidney stone     Multiple thyroid nodules 06/2020    Unspecified sleep apnea        Past Surgical History:   Procedure Laterality Date    APPENDECTOMY      CHOLECYSTECTOMY  2015    LITHOTRIPSY      SHOULDER DEBRIDEMENT Right 10/2013    WRIST SURGERY Left        Current Outpatient Medications   Medication Sig Dispense Refill    olmesartan (BENICAR) 20 MG tablet Take 1 tablet by mouth once daily 90 tablet 0    EUTHYROX 200 MCG tablet Take 1 tablet by mouth once daily 90 tablet 0    vitamin D (ERGOCALCIFEROL) 1.25 MG (38461 UT) CAPS capsule Take 1 capsule by mouth once a week 12 capsule 0    FLUoxetine (PROZAC) 10 MG capsule Take 1 capsule by mouth once daily 90 capsule 0    EUTHYROX 50 MCG tablet Take 1 tablet by mouth once daily 90 tablet 0    amLODIPine (NORVASC) 5 MG tablet Take 1 tablet by mouth once daily 90 tablet 0    buPROPion (WELLBUTRIN XL) 150 MG extended release tablet Take 1 tablet by mouth once daily 90 tablet 0    atorvastatin (LIPITOR) 40 MG tablet Take 1 tablet by mouth nightly 90 tablet 0    albuterol sulfate HFA (VENTOLIN HFA) 108 (90 Base) MCG/ACT inhaler Inhale 2 puffs into the lungs every 6 hours as needed for Wheezing      esomeprazole (NEXIUM) 40 MG delayed release capsule Take 1 capsule by mouth every morning (before breakfast) 30 capsule 2     No current facility-administered medications for this visit. No Known Allergies    Social History     Socioeconomic History    Marital status:      Spouse name: None    Number of children: None    Years of education: None    Highest education level: None   Occupational History    None   Social Needs    Financial resource strain: None    Food insecurity     Worry: None     Inability: None    Transportation needs     Medical: None     Non-medical: None   Tobacco Use    Smoking status: Former Smoker     Quit date: 1999     Years since quittin.8    Smokeless tobacco: Never Used   Substance and Sexual Activity    Alcohol use: No    Drug use: No    Sexual activity: None   Lifestyle    Physical activity     Days per week: None     Minutes per session: None    Stress: None   Relationships    Social connections     Talks on phone: None     Gets together: None     Attends [employer] service: None     Active member of club or organization: None     Attends meetings of clubs or organizations: None     Relationship status: None    Intimate partner violence     Fear of current or ex partner: None     Emotionally abused: None     Physically abused: None     Forced sexual activity: None   Other Topics Concern    None   Social History Narrative    None       Family History   Problem Relation Age of Onset    Asthma Mother     Diabetes Maternal Grandmother     Asthma Maternal Grandmother     Coronary Art Dis Father        REVIEW OF SYSTEMS:  Review of Systems   Constitutional: Negative for chills, fatigue and fever. HENT: Negative for congestion, ear pain and trouble swallowing. Eyes: Negative for discharge and redness.    Respiratory: Negative for cough and shortness of breath. Cardiovascular: Negative for chest pain. Gastrointestinal: Negative for abdominal distention, abdominal pain, constipation, diarrhea, nausea and vomiting. Endocrine: Negative for cold intolerance and heat intolerance. Genitourinary: Negative for difficulty urinating, dysuria, flank pain, frequency, hematuria and urgency. Musculoskeletal: Negative for back pain and gait problem. Skin: Negative for pallor and wound. Allergic/Immunologic: Negative for environmental allergies and immunocompromised state. Neurological: Negative for dizziness and weakness. Hematological: Negative for adenopathy. Does not bruise/bleed easily. Psychiatric/Behavioral: Negative for agitation and confusion. PHYSICAL EXAM:  BP (!) 148/84   Ht 6' 3\" (1.905 m)   Wt 298 lb 3.2 oz (135.3 kg)   BMI 37.27 kg/m²   Physical Exam  Constitutional:       General: He is not in acute distress. Appearance: Normal appearance. He is well-developed. HENT:      Head: Normocephalic and atraumatic. Nose: Nose normal.   Eyes:      General: No scleral icterus. Conjunctiva/sclera: Conjunctivae normal.      Pupils: Pupils are equal, round, and reactive to light. Neck:      Musculoskeletal: Normal range of motion and neck supple. Trachea: No tracheal deviation. Cardiovascular:      Rate and Rhythm: Normal rate and regular rhythm. Pulmonary:      Effort: Pulmonary effort is normal. No respiratory distress. Breath sounds: No stridor. Abdominal:      General: There is no distension. Palpations: Abdomen is soft. There is no mass. Tenderness: There is no abdominal tenderness. Musculoskeletal: Normal range of motion. General: No tenderness. Lymphadenopathy:      Cervical: No cervical adenopathy. Skin:     General: Skin is warm and dry. Findings: No erythema.    Neurological:      Mental Status: He is alert and oriented to person, place, and time.   Psychiatric:         Behavior: Behavior normal.         Judgment: Judgment normal.             DATA:  CMP:    Lab Results   Component Value Date     06/20/2020    K 3.7 06/20/2020     06/20/2020    CO2 24 06/20/2020    BUN 7 06/20/2020    CREATININE 0.7 06/20/2020    LABGLOM >60 06/20/2020    GLUCOSE 134 06/20/2020    PROT 6.6 06/20/2020    LABALBU 4.4 06/20/2020    CALCIUM 9.0 06/20/2020    BILITOT 0.4 06/20/2020    ALKPHOS 74 06/20/2020    AST 23 06/20/2020    ALT 35 06/20/2020     Results for orders placed or performed in visit on 03/29/21   POCT Urinalysis no Micro   Result Value Ref Range    Color, UA YELLOW     Clarity, UA CLEAR     Glucose, UA POC NEG     Bilirubin, UA NEG     Ketones, UA NEG     Spec Grav, UA 1.025     Blood, UA POC NEG     pH, UA 5.5     Protein, UA POC NEG     Urobilinogen, UA 0.2     Leukocytes, UA NEG     Nitrite, UA NEG     Appearance, Fluid Clear Clear, Slightly Cloudy     Lab Results   Component Value Date    PSA 2.16 02/22/2021    PSA 1.53 07/24/2020    PSA 1.32 03/18/2020         1. Benign prostatic hyperplasia with weak urinary stream  His PSA remains below his age-adjusted cut has slowly creeping up we will continue to monitor this closely. He has fairly severe lower urinary tract symptoms for 50year-old. We discussed beginning medical therapy he says he did want take any medicines now. We will have him return at his next visit with a flow study prior if this shows slow flow he may need cystoscopy to make sure he does not have obstruction or stricture  - POCT Urinalysis no Micro  - PSA, Total and Free; Future  - FL URINE FLOW MEASUREMENT;  Future      Orders Placed This Encounter   Procedures    PSA, Total and Free     Prior to next visit in 6 months     Standing Status:   Future     Standing Expiration Date:   3/29/2022    POCT Urinalysis no Micro    FL URINE FLOW MEASUREMENT     Standing Status:   Future     Standing Expiration Date:   3/29/2022

## 2021-05-07 DIAGNOSIS — E78.2 MIXED HYPERLIPIDEMIA: ICD-10-CM

## 2021-05-07 RX ORDER — ATORVASTATIN CALCIUM 40 MG/1
TABLET, FILM COATED ORAL
Qty: 90 TABLET | Refills: 0 | Status: SHIPPED | OUTPATIENT
Start: 2021-05-07 | End: 2021-06-14 | Stop reason: SDUPTHER

## 2021-05-20 DIAGNOSIS — E78.2 MIXED HYPERLIPIDEMIA: ICD-10-CM

## 2021-05-20 DIAGNOSIS — E03.8 OTHER SPECIFIED HYPOTHYROIDISM: ICD-10-CM

## 2021-05-20 DIAGNOSIS — F41.9 ANXIETY: ICD-10-CM

## 2021-05-20 DIAGNOSIS — E55.9 VITAMIN D DEFICIENCY: ICD-10-CM

## 2021-05-20 DIAGNOSIS — I10 ESSENTIAL HYPERTENSION: ICD-10-CM

## 2021-05-20 LAB
ALBUMIN SERPL-MCNC: 4.2 G/DL (ref 3.5–5.2)
ALP BLD-CCNC: 86 U/L (ref 40–130)
ALT SERPL-CCNC: 42 U/L (ref 5–41)
ANION GAP SERPL CALCULATED.3IONS-SCNC: 12 MMOL/L (ref 7–19)
AST SERPL-CCNC: 23 U/L (ref 5–40)
BASOPHILS ABSOLUTE: 0.1 K/UL (ref 0–0.2)
BASOPHILS RELATIVE PERCENT: 0.9 % (ref 0–1)
BILIRUB SERPL-MCNC: <0.2 MG/DL (ref 0.2–1.2)
BILIRUBIN URINE: NEGATIVE
BLOOD, URINE: NEGATIVE
BUN BLDV-MCNC: 9 MG/DL (ref 6–20)
CALCIUM SERPL-MCNC: 9 MG/DL (ref 8.6–10)
CHLORIDE BLD-SCNC: 104 MMOL/L (ref 98–111)
CHOLESTEROL, TOTAL: 152 MG/DL (ref 160–199)
CLARITY: ABNORMAL
CO2: 22 MMOL/L (ref 22–29)
COLOR: ABNORMAL
CORTISOL TOTAL: 0.7 UG/DL
CREAT SERPL-MCNC: 0.7 MG/DL (ref 0.5–1.2)
EOSINOPHILS ABSOLUTE: 0.1 K/UL (ref 0–0.6)
EOSINOPHILS RELATIVE PERCENT: 1.5 % (ref 0–5)
GFR AFRICAN AMERICAN: >59
GFR NON-AFRICAN AMERICAN: >60
GLUCOSE BLD-MCNC: 135 MG/DL (ref 74–109)
GLUCOSE URINE: NEGATIVE MG/DL
HCT VFR BLD CALC: 43 % (ref 42–52)
HDLC SERPL-MCNC: 37 MG/DL (ref 55–121)
HEMOGLOBIN: 14.7 G/DL (ref 14–18)
IMMATURE GRANULOCYTES #: 0 K/UL
KETONES, URINE: ABNORMAL MG/DL
LDL CHOLESTEROL CALCULATED: 103 MG/DL
LEUKOCYTE ESTERASE, URINE: NEGATIVE
LYMPHOCYTES ABSOLUTE: 1.7 K/UL (ref 1.1–4.5)
LYMPHOCYTES RELATIVE PERCENT: 20.2 % (ref 20–40)
MCH RBC QN AUTO: 31.1 PG (ref 27–31)
MCHC RBC AUTO-ENTMCNC: 34.2 G/DL (ref 33–37)
MCV RBC AUTO: 90.9 FL (ref 80–94)
MONOCYTES ABSOLUTE: 0.7 K/UL (ref 0–0.9)
MONOCYTES RELATIVE PERCENT: 8.5 % (ref 0–10)
NEUTROPHILS ABSOLUTE: 5.9 K/UL (ref 1.5–7.5)
NEUTROPHILS RELATIVE PERCENT: 68.4 % (ref 50–65)
NITRITE, URINE: NEGATIVE
PDW BLD-RTO: 12.9 % (ref 11.5–14.5)
PH UA: 6 (ref 5–8)
PLATELET # BLD: 311 K/UL (ref 130–400)
PMV BLD AUTO: 10.3 FL (ref 9.4–12.4)
POTASSIUM REFLEX MAGNESIUM: 4.4 MMOL/L (ref 3.5–5)
PROTEIN UA: NEGATIVE MG/DL
RBC # BLD: 4.73 M/UL (ref 4.7–6.1)
SODIUM BLD-SCNC: 138 MMOL/L (ref 136–145)
SPECIFIC GRAVITY UA: 1.03 (ref 1–1.03)
TOTAL PROTEIN: 6.7 G/DL (ref 6.6–8.7)
TRIGL SERPL-MCNC: 61 MG/DL (ref 0–149)
TSH REFLEX FT4: 1.16 UIU/ML (ref 0.35–5.5)
UROBILINOGEN, URINE: 1 E.U./DL
VITAMIN D 25-HYDROXY: 32.2 NG/ML
WBC # BLD: 8.6 K/UL (ref 4.8–10.8)

## 2021-05-21 LAB — HBA1C MFR BLD: 5.6 % (ref 4–6)

## 2021-06-03 ENCOUNTER — TELEPHONE (OUTPATIENT)
Dept: FAMILY MEDICINE CLINIC | Age: 46
End: 2021-06-03

## 2021-06-03 NOTE — TELEPHONE ENCOUNTER
Called and left v/m for Pt x1. Pt is scheduled with DENISHA Bland on 06/11/2021. Addie Mcmullen will be out of clinic this day and Pt will need to reschedule for next avail.

## 2021-06-09 ENCOUNTER — HOSPITAL ENCOUNTER (OUTPATIENT)
Dept: MRI IMAGING | Age: 46
Discharge: HOME | End: 2021-06-09
Payer: COMMERCIAL

## 2021-06-09 DIAGNOSIS — D35.00 ADRENAL ADENOMA, UNSPECIFIED LATERALITY: ICD-10-CM

## 2021-06-09 PROCEDURE — A9577 INJ MULTIHANCE: HCPCS | Performed by: NURSE PRACTITIONER

## 2021-06-09 PROCEDURE — 74183 MRI ABD W/O CNTR FLWD CNTR: CPT

## 2021-06-09 PROCEDURE — 6360000004 HC RX CONTRAST MEDICATION: Performed by: NURSE PRACTITIONER

## 2021-06-09 RX ADMIN — GADOBENATE DIMEGLUMINE 20 ML: 529 INJECTION, SOLUTION INTRAVENOUS at 07:46

## 2021-06-10 DIAGNOSIS — R73.01 IMPAIRED FASTING GLUCOSE: Primary | ICD-10-CM

## 2021-06-14 ENCOUNTER — OFFICE VISIT (OUTPATIENT)
Dept: FAMILY MEDICINE CLINIC | Age: 46
End: 2021-06-14
Payer: COMMERCIAL

## 2021-06-14 VITALS
HEIGHT: 75 IN | DIASTOLIC BLOOD PRESSURE: 78 MMHG | HEART RATE: 74 BPM | TEMPERATURE: 97.5 F | SYSTOLIC BLOOD PRESSURE: 120 MMHG | OXYGEN SATURATION: 98 % | WEIGHT: 288 LBS | BODY MASS INDEX: 35.81 KG/M2

## 2021-06-14 DIAGNOSIS — E03.8 OTHER SPECIFIED HYPOTHYROIDISM: ICD-10-CM

## 2021-06-14 DIAGNOSIS — F41.9 ANXIETY: ICD-10-CM

## 2021-06-14 DIAGNOSIS — R73.01 IFG (IMPAIRED FASTING GLUCOSE): ICD-10-CM

## 2021-06-14 DIAGNOSIS — I10 ESSENTIAL HYPERTENSION: ICD-10-CM

## 2021-06-14 DIAGNOSIS — E78.2 MIXED HYPERLIPIDEMIA: ICD-10-CM

## 2021-06-14 DIAGNOSIS — I10 HYPERTENSION, UNSPECIFIED TYPE: Primary | ICD-10-CM

## 2021-06-14 DIAGNOSIS — E55.9 VITAMIN D DEFICIENCY: ICD-10-CM

## 2021-06-14 DIAGNOSIS — F34.1 DYSTHYMIA: ICD-10-CM

## 2021-06-14 PROCEDURE — 99214 OFFICE O/P EST MOD 30 MIN: CPT | Performed by: NURSE PRACTITIONER

## 2021-06-14 RX ORDER — LEVOTHYROXINE SODIUM 0.2 MG/1
TABLET ORAL
Qty: 90 TABLET | Refills: 1 | Status: SHIPPED | OUTPATIENT
Start: 2021-06-14 | End: 2021-08-23 | Stop reason: SDUPTHER

## 2021-06-14 RX ORDER — BUPROPION HYDROCHLORIDE 300 MG/1
TABLET ORAL
Qty: 90 TABLET | Refills: 1 | Status: SHIPPED | OUTPATIENT
Start: 2021-06-14 | End: 2021-11-23

## 2021-06-14 RX ORDER — ALBUTEROL SULFATE 90 UG/1
2 AEROSOL, METERED RESPIRATORY (INHALATION) EVERY 6 HOURS PRN
Qty: 1 INHALER | Refills: 5 | Status: SHIPPED | OUTPATIENT
Start: 2021-06-14

## 2021-06-14 RX ORDER — ATORVASTATIN CALCIUM 40 MG/1
TABLET, FILM COATED ORAL
Qty: 90 TABLET | Refills: 1 | Status: SHIPPED | OUTPATIENT
Start: 2021-06-14 | End: 2021-11-23

## 2021-06-14 RX ORDER — AMLODIPINE BESYLATE 5 MG/1
TABLET ORAL
Qty: 90 TABLET | Refills: 1 | Status: SHIPPED | OUTPATIENT
Start: 2021-06-14 | End: 2021-11-23

## 2021-06-14 RX ORDER — FLUOXETINE 10 MG/1
CAPSULE ORAL
Qty: 90 CAPSULE | Refills: 1 | Status: SHIPPED | OUTPATIENT
Start: 2021-06-14 | End: 2021-11-23

## 2021-06-14 RX ORDER — OLMESARTAN MEDOXOMIL 20 MG/1
TABLET ORAL
Qty: 90 TABLET | Refills: 1 | Status: SHIPPED | OUTPATIENT
Start: 2021-06-14 | End: 2021-11-23

## 2021-06-14 RX ORDER — ESOMEPRAZOLE MAGNESIUM 40 MG/1
40 CAPSULE, DELAYED RELEASE ORAL
Qty: 30 CAPSULE | Refills: 2 | Status: SHIPPED | OUTPATIENT
Start: 2021-06-14 | End: 2021-08-23

## 2021-06-14 RX ORDER — ERGOCALCIFEROL 1.25 MG/1
50000 CAPSULE ORAL WEEKLY
Qty: 12 CAPSULE | Refills: 1 | Status: SHIPPED | OUTPATIENT
Start: 2021-06-14 | End: 2021-11-23

## 2021-06-14 RX ORDER — LEVOTHYROXINE SODIUM 0.05 MG/1
TABLET ORAL
Qty: 90 TABLET | Refills: 1 | Status: SHIPPED | OUTPATIENT
Start: 2021-06-14 | End: 2021-08-23 | Stop reason: SDUPTHER

## 2021-06-14 SDOH — ECONOMIC STABILITY: FOOD INSECURITY: WITHIN THE PAST 12 MONTHS, THE FOOD YOU BOUGHT JUST DIDN'T LAST AND YOU DIDN'T HAVE MONEY TO GET MORE.: NEVER TRUE

## 2021-06-14 SDOH — ECONOMIC STABILITY: FOOD INSECURITY: WITHIN THE PAST 12 MONTHS, YOU WORRIED THAT YOUR FOOD WOULD RUN OUT BEFORE YOU GOT MONEY TO BUY MORE.: NEVER TRUE

## 2021-06-14 ASSESSMENT — PATIENT HEALTH QUESTIONNAIRE - PHQ9
SUM OF ALL RESPONSES TO PHQ QUESTIONS 1-9: 0
SUM OF ALL RESPONSES TO PHQ9 QUESTIONS 1 & 2: 0
1. LITTLE INTEREST OR PLEASURE IN DOING THINGS: 0
2. FEELING DOWN, DEPRESSED OR HOPELESS: 0

## 2021-06-14 ASSESSMENT — SOCIAL DETERMINANTS OF HEALTH (SDOH): HOW HARD IS IT FOR YOU TO PAY FOR THE VERY BASICS LIKE FOOD, HOUSING, MEDICAL CARE, AND HEATING?: NOT HARD AT ALL

## 2021-06-14 NOTE — PROGRESS NOTES
SUBJECTIVE:    Patient ID: Trang Myles is a50 y.o. male. Trang Myles is here today for Discuss Labs  . HPI:   HPI     Patient is here today to follow-up on most recent lab results and for medication refills. Patient does have past medical history of hyperlipidemia. He is adhering to statin treatment. There has been no report of intolerance of this medication. 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 Patient also has past medical history of hypertension and is normotensive today onset of symptoms has been well over 5 years. He is normotensive today and has not reported any chest pain or shortness of breath. Patient has not reported any lower extremity swelling either. Patient does have vitamin D deficiency and is finally within the normal range of vitamin D. This is the highest he has been in quite some time. He is adhering to treatment regimen. Patient also has hypothyroidism and is adhering to daily treatment plan. Patient also sees endocrinology in relation to her adrenal gland abnormality. Patient has now had MRI abdomen with and without contrast on June 9, 2021. The MRI is showing no change to the 1.6 cm left adrenal gland adenoma and continual liver steatosis noted. Patient is aware of this today at this visit and will be following up with endocrinology. Patient has had thyroid nodules found in the past and is also seeing an endocrinologist regarding this. Patient has battled depression for quite some time. He also has some underlying anxiety. At the current time he is taking Wellbutrin as well as fluoxetine. He does feel that depression symptoms could be better. He and I have discussed treatment options and have decided together that an increase of Wellbutrin would be the wiser choice for him at this time.   He has no ideas of self-harm that have been reported. Upon review of lab with patient, it is noted that patient's blood sugar is slightly elevated and A1c is normal.  Future A1c has been ordered for patient along with other lab that will be followed through. No visits with results within 1 Day(s) from this visit. Latest known visit with results is:   Orders Only on 05/20/2021   Component Date Value Ref Range Status    Vit D, 25-Hydroxy 05/20/2021 32.2  >=30 ng/mL Final    Comment: <=20 ng/mL. ........... [place] Deficient  21-29 ng/mL. ......... [place] Insufficient  >=30 ng/mL. ........ [place] Sufficient      TSH Reflex FT4 05/20/2021 1.16  0.35 - 5.50 uIU/mL Final    Cholesterol, Total 05/20/2021 152* 160 - 199 mg/dL Final    Comment: <160 MG/DL=OPTIMAL    160-199 MG/DL= DESIRABLE    200-239 MG/DL=BORDERLINE-INCREASED RISK OF ATHEROSCLEROTIC  CARDIOVASCULAR DISEASE    > OR = 240 MG/DL-ASSOCIATED WITH AN INCREASED RISK OF  ATHROSCLEROTIC CARDIOVASCULAR DISEASE      Triglycerides 05/20/2021 61  0 - 149 mg/dL Final    HDL 05/20/2021 37* 55 - 121 mg/dL Final    Comment: VALUES>60 MG/DL ARE ASSOCIATED WITH A DECREASED RISK OF  ATHEROSCLEROTIC CARDIOVASCULAR DISEASE      LDL Calculated 05/20/2021 103  <100 mg/dL Final    Comment: <100 MG/DL=OPITIMAL    100-129 MG/DL=DESIRABLE    130-159 MG/DL BORDERLINE=INCREASED RISK OF ATHEROSCLEROTIC  CARDIOVASCULAR DISEASE    > OR = 160 MG/DL=ASSOCIATED WITH AN INCREASE RISK OF  ATHEROSCLEROTIC CARDIOVASCULAR DISEASE      Sodium 05/20/2021 138  136 - 145 mmol/L Final    Potassium reflex Magnesium 05/20/2021 4.4  3.5 - 5.0 mmol/L Final    Chloride 05/20/2021 104  98 - 111 mmol/L Final    CO2 05/20/2021 22  22 - 29 mmol/L Final    Anion Gap 05/20/2021 12  7 - 19 mmol/L Final    Glucose 05/20/2021 135* 74 - 109 mg/dL Final    BUN 05/20/2021 9  6 - 20 mg/dL Final    CREATININE 05/20/2021 0.7  0.5 - 1.2 mg/dL Final    GFR Non- 05/20/2021 >60  >60 Final    Comment:  This calculation may be inaccurate for patients under the age of 25 years. For ages 25 and older, a GFR >60 mL/min/1.73m2 (not corrected for weight) is  valid for stable renal function.  GFR  05/20/2021 >59  >59 Final    Comment: Chronic Kidney Disease: less than 60 ml/min/1.73 sq.m. Kidney Failure: less than 15 ml/min/1.73 sq.m. Results valid for patients 18 years and older.       Calcium 05/20/2021 9.0  8.6 - 10.0 mg/dL Final    Total Protein 05/20/2021 6.7  6.6 - 8.7 g/dL Final    Albumin 05/20/2021 4.2  3.5 - 5.2 g/dL Final    Total Bilirubin 05/20/2021 <0.2  0.2 - 1.2 mg/dL Final    Alkaline Phosphatase 05/20/2021 86  40 - 130 U/L Final    ALT 05/20/2021 42* 5 - 41 U/L Final    AST 05/20/2021 23  5 - 40 U/L Final    WBC 05/20/2021 8.6  4.8 - 10.8 K/uL Final    RBC 05/20/2021 4.73  4.70 - 6.10 M/uL Final    Hemoglobin 05/20/2021 14.7  14.0 - 18.0 g/dL Final    Hematocrit 05/20/2021 43.0  42.0 - 52.0 % Final    MCV 05/20/2021 90.9  80.0 - 94.0 fL Final    MCH 05/20/2021 31.1* 27.0 - 31.0 pg Final    MCHC 05/20/2021 34.2  33.0 - 37.0 g/dL Final    RDW 05/20/2021 12.9  11.5 - 14.5 % Final    Platelets 82/29/5321 311  130 - 400 K/uL Final    MPV 05/20/2021 10.3  9.4 - 12.4 fL Final    Neutrophils % 05/20/2021 68.4* 50.0 - 65.0 % Final    Lymphocytes % 05/20/2021 20.2  20.0 - 40.0 % Final    Monocytes % 05/20/2021 8.5  0.0 - 10.0 % Final    Eosinophils % 05/20/2021 1.5  0.0 - 5.0 % Final    Basophils % 05/20/2021 0.9  0.0 - 1.0 % Final    Neutrophils Absolute 05/20/2021 5.9  1.5 - 7.5 K/uL Final    Immature Granulocytes # 05/20/2021 0.0  K/uL Final    Lymphocytes Absolute 05/20/2021 1.7  1.1 - 4.5 K/uL Final    Monocytes Absolute 05/20/2021 0.70  0.00 - 0.90 K/uL Final    Eosinophils Absolute 05/20/2021 0.10  0.00 - 0.60 K/uL Final    Basophils Absolute 05/20/2021 0.10  0.00 - 0.20 K/uL Final    Color, UA 05/20/2021 DARK YELLOW* Straw/Yellow Final    Clarity, UA 05/20/2021 TURBID* Clear Final    Glucose, Ur 05/20/2021 Negative  Negative mg/dL Final    Bilirubin Urine 05/20/2021 Negative  Negative Final    Ketones, Urine 05/20/2021 TRACE* Negative mg/dL Final    Specific Gravity, UA 05/20/2021 1.028  1.005 - 1.030 Final    Blood, Urine 05/20/2021 Negative  Negative Final    pH, UA 05/20/2021 6.0  5.0 - 8.0 Final    Protein, UA 05/20/2021 Negative  Negative mg/dL Final    Urobilinogen, Urine 05/20/2021 1.0  <2.0 E.U./dL Final    Nitrite, Urine 05/20/2021 Negative  Negative Final    Leukocyte Esterase, Urine 05/20/2021 Negative  Negative Final    Cortisol 05/20/2021 0.7  ug/dL Final    Comment:                  Default Normal Ranges                      7-9am 4.3-22.4                      3-5pm 3.1-16.7      Cortisol levels are generally at their highest from 0500 to 1000  and at their lowest from 2000 to 0400. The PM level is usually  one-half to one-third the AM level. Patients receiving prednisolone or prednisone therapy may show  artificially elevated cortisol levels due to method interference.  Hemoglobin A1C 05/20/2021 5.6  4.0 - 6.0 % Final    Comment: HbA1c levels >6% are an indication of hyperglycemia during the preceding 2  to 3 months or longer. HbA1c levels may reach 20% or higher in poorly controlled diabetes. Therapeutic action is suggested at levels above 8%. Diabetes patients with HbA1c levels below 7% meet the goal of the American  Diabetes Association. HbA1c levels below the established reference range may indicate recent  episodes of hypoglycemia, the presence of Hb variants, or shortened lifetime  of erythrocytes.                  Past Medical History:   Diagnosis Date    Adenoma of left adrenal gland     Hyperlipidemia     Hypertension     Hypothyroidism     Kidney stone     Multiple thyroid nodules 06/2020    Unspecified sleep apnea      Prior to Visit Medications    Medication Sig Taking?  Authorizing Provider   albuterol sulfate HFA (VENTOLIN HFA) 108 (90 Base) MCG/ACT inhaler Inhale 2 puffs into the lungs every 6 hours as needed for Wheezing Yes DENISHA Ashton   levothyroxine (EUTHYROX) 50 MCG tablet Take 1 tablet by mouth once daily Yes DENISHA Ashton   levothyroxine (EUTHYROX) 200 MCG tablet Take 1 tablet by mouth once daily Yes DENISHA Ashton   atorvastatin (LIPITOR) 40 MG tablet 1 po nightly for cholesterol Yes DENISHA Ashton   olmesartan (BENICAR) 20 MG tablet Take 1 tablet by mouth once daily Yes DENISHA Ashton   vitamin D (ERGOCALCIFEROL) 1.25 MG (59316 UT) CAPS capsule Take 1 capsule by mouth once a week Yes DENISHA Ashton   FLUoxetine (PROZAC) 10 MG capsule Take 1 capsule by mouth once daily Yes DENISHA Ashton   amLODIPine (NORVASC) 5 MG tablet Take 1 tablet by mouth once daily Yes DENISHA Ashton   buPROPion (WELLBUTRIN XL) 300 MG extended release tablet Take 1 tablet by mouth once daily Yes DENISHA Ashton   esomeprazole (NEXIUM) 40 MG delayed release capsule Take 1 capsule by mouth every morning (before breakfast) Yes DENISHA Ashton     No Known Allergies  Past Surgical History:   Procedure Laterality Date    APPENDECTOMY      CHOLECYSTECTOMY  2015    LITHOTRIPSY      SHOULDER DEBRIDEMENT Right 10/2013    WRIST SURGERY Left      Family History   Problem Relation Age of Onset    Asthma Mother     Diabetes Maternal Grandmother     Asthma Maternal Grandmother     Coronary Art Dis Father      Social History     Socioeconomic History    Marital status:      Spouse name: Not on file    Number of children: Not on file    Years of education: Not on file    Highest education level: Not on file   Occupational History    Not on file   Tobacco Use    Smoking status: Former Smoker     Quit date: 1999     Years since quittin.0    Smokeless tobacco: Never Used   Substance and Sexual Activity    Alcohol use: No    Drug use: No    Sexual activity: Not on file   Other Topics Concern    Not on file   Social History Narrative    Not on file     Social Determinants of Health     Financial Resource Strain: Low Risk     Difficulty of Paying Living Expenses: Not hard at all   Food Insecurity: No Food Insecurity    Worried About Running Out of Food in the Last Year: Never true    [address] in the Last Year: Never true   Transportation Needs:     Lack of Transportation (Medical):  Lack of Transportation (Non-Medical):    Physical Activity:     Days of Exercise per Week:     Minutes of Exercise per Session:    Stress:     Feeling of Stress :    Social Connections:     Frequency of Communication with Friends and Family:     Frequency of Social Gatherings with Friends and Family:     Attends Church Services:     Active Member of Clubs or Organizations:     Attends Club or Organization Meetings:     Marital Status:    Intimate Partner Violence:     Fear of Current or Ex-Partner:     Emotionally Abused:     Physically Abused:     Sexually Abused:        Review of Systems   Constitutional: Positive for fatigue. Negative for unexpected weight change. HENT: Negative. Respiratory: Negative. Cardiovascular: Negative. Genitourinary: Negative. Neurological: Negative. Psychiatric/Behavioral: Positive for sleep disturbance (r/t work). The patient is nervous/anxious. Down       OBJECTIVE:    Physical Exam  Vitals and nursing note reviewed. Constitutional:       General: He is not in acute distress. Appearance: Normal appearance. He is well-developed. He is obese. He is not ill-appearing or toxic-appearing. HENT:      Head: Normocephalic and atraumatic. Eyes:      Extraocular Movements: Extraocular movements intact. Conjunctiva/sclera: Conjunctivae normal.      Pupils: Pupils are equal, round, and reactive to light. Neck:      Trachea: No tracheal deviation. Cardiovascular:      Rate and Rhythm: Normal rate and regular rhythm.       Heart sounds: Normal heart sounds. Pulmonary:      Effort: Pulmonary effort is normal.      Breath sounds: Normal breath sounds. Abdominal:      General: Bowel sounds are normal.      Palpations: Abdomen is soft. Musculoskeletal:      Cervical back: Normal range of motion and neck supple. No rigidity. Right lower leg: No edema. Left lower leg: No edema. Lymphadenopathy:      Cervical: No cervical adenopathy. Skin:     General: Skin is warm and dry. Capillary Refill: Capillary refill takes less than 2 seconds. Neurological:      General: No focal deficit present. Mental Status: He is alert and oriented to person, place, and time. Mental status is at baseline. Psychiatric:         Mood and Affect: Mood normal. Mood is not anxious or depressed. Affect is not angry. Speech: Speech normal.         Behavior: Behavior normal.         Thought Content: Thought content normal.         Judgment: Judgment normal.         /78 (Site: Left Upper Arm, Position: Sitting, Cuff Size: Large Adult)   Pulse 74   Temp 97.5 °F (36.4 °C) (Temporal)   Ht 6' 3\" (1.905 m)   Wt 288 lb (130.6 kg)   SpO2 98%   BMI 36.00 kg/m²      ASSESSMENT:      ICD-10-CM    1. Hypertension, unspecified type  I10 olmesartan (BENICAR) 20 MG tablet   2. Other specified hypothyroidism  E03.8 TSH WITH REFLEX TO FT4     levothyroxine (EUTHYROX) 200 MCG tablet   3. Mixed hyperlipidemia  E78.2 atorvastatin (LIPITOR) 40 MG tablet   4. Vitamin D deficiency  E55.9 Vitamin D 25 Hydroxy     vitamin D (ERGOCALCIFEROL) 1.25 MG (10725 UT) CAPS capsule   5. Anxiety  F41.9 FLUoxetine (PROZAC) 10 MG capsule   6. Essential hypertension  I10 amLODIPine (NORVASC) 5 MG tablet   7. Dysthymia  F34.1 buPROPion (WELLBUTRIN XL) 300 MG extended release tablet   8.  IFG (impaired fasting glucose)  R73.01 Hemoglobin A1C     Comprehensive Metabolic Panel       PLAN:    Violet Bello: Discuss Labs  copy disk of MRI abd for endo  Lab in 3-6mo  Med changes as above.  Side effects of new treatment discussed. Pt will f/u asap if any increase in negativity. Never stop medication without consulting healthcare provider. f/u call in approx 2wks r/t med change. Diagnosis and orders for this visit are above. Please note that this chart was generated using dragon dictationsoftware. Although every effort was made to ensure the accuracy of this automated transcription, some errors in transcription may have occurred.

## 2021-06-21 ASSESSMENT — ENCOUNTER SYMPTOMS: RESPIRATORY NEGATIVE: 1

## 2021-08-23 DIAGNOSIS — E03.8 OTHER SPECIFIED HYPOTHYROIDISM: ICD-10-CM

## 2021-08-23 RX ORDER — ESOMEPRAZOLE MAGNESIUM 40 MG/1
CAPSULE, DELAYED RELEASE ORAL
Qty: 90 CAPSULE | Refills: 1 | Status: SHIPPED | OUTPATIENT
Start: 2021-08-23 | End: 2022-02-18

## 2021-08-23 RX ORDER — LEVOTHYROXINE SODIUM 0.2 MG/1
TABLET ORAL
Qty: 90 TABLET | Refills: 1 | Status: SHIPPED | OUTPATIENT
Start: 2021-08-23 | End: 2022-02-18

## 2021-08-23 RX ORDER — LEVOTHYROXINE SODIUM 0.05 MG/1
TABLET ORAL
Qty: 90 TABLET | Refills: 1 | Status: SHIPPED | OUTPATIENT
Start: 2021-08-23 | End: 2022-02-18

## 2021-09-22 ENCOUNTER — NURSE ONLY (OUTPATIENT)
Dept: UROLOGY | Age: 46
End: 2021-09-22
Payer: COMMERCIAL

## 2021-09-22 DIAGNOSIS — N40.1 BENIGN PROSTATIC HYPERPLASIA WITH WEAK URINARY STREAM: Primary | ICD-10-CM

## 2021-09-22 DIAGNOSIS — R39.12 BENIGN PROSTATIC HYPERPLASIA WITH WEAK URINARY STREAM: Primary | ICD-10-CM

## 2021-09-22 PROCEDURE — 51798 US URINE CAPACITY MEASURE: CPT | Performed by: NURSE PRACTITIONER

## 2021-09-22 PROCEDURE — 51741 ELECTRO-UROFLOWMETRY FIRST: CPT | Performed by: NURSE PRACTITIONER

## 2021-09-22 NOTE — PROGRESS NOTES
Uroflow Study   Indications:Benign prostatic hyperplasia with weak urinary stream  Voided Volume: 373 ml  Time to Peak Flow: 6.1 sec  Peak Flow: 26. 0mL/sec  Average Flow Rate: 10.9 mL/sec  Voiding time: 45.7 /sec  Flow time: 30.7/sec  Interpretation: Study to be reviewed by Dr. Allison Warren     Postvoid Residual:  Post-void Residual by bladder scanner:  95ml    Patient to keep scheduled follow up with Dr Allison Warren on 11/1/21.

## 2021-10-29 DIAGNOSIS — N40.1 BENIGN PROSTATIC HYPERPLASIA WITH WEAK URINARY STREAM: ICD-10-CM

## 2021-10-29 DIAGNOSIS — R39.12 BENIGN PROSTATIC HYPERPLASIA WITH WEAK URINARY STREAM: ICD-10-CM

## 2021-11-01 ENCOUNTER — OFFICE VISIT (OUTPATIENT)
Dept: UROLOGY | Age: 46
End: 2021-11-01
Payer: COMMERCIAL

## 2021-11-01 VITALS — HEIGHT: 75 IN | WEIGHT: 294 LBS | BODY MASS INDEX: 36.56 KG/M2

## 2021-11-01 DIAGNOSIS — R97.20 INCREASED PROSTATE SPECIFIC ANTIGEN (PSA) VELOCITY: ICD-10-CM

## 2021-11-01 DIAGNOSIS — N40.1 BENIGN PROSTATIC HYPERPLASIA WITH WEAK URINARY STREAM: Primary | ICD-10-CM

## 2021-11-01 DIAGNOSIS — R39.12 BENIGN PROSTATIC HYPERPLASIA WITH WEAK URINARY STREAM: Primary | ICD-10-CM

## 2021-11-01 LAB
APPEARANCE FLUID: CLEAR
BILIRUBIN, POC: NORMAL
BLOOD URINE, POC: NORMAL
CLARITY, POC: CLEAR
COLOR, POC: YELLOW
GLUCOSE URINE, POC: NORMAL
KETONES, POC: NORMAL
LEUKOCYTE EST, POC: NORMAL
NITRITE, POC: NORMAL
PH, POC: 7
PROSTATE SPECIFIC ANTIGEN FREE: 0.3 NG/ML
PROSTATE SPECIFIC ANTIGEN PERCENT FREE: 20 %
PROSTATE SPECIFIC ANTIGEN: 1.5 NG/ML (ref 0–4)
PROTEIN, POC: NORMAL
SPECIFIC GRAVITY, POC: 1.02
UROBILINOGEN, POC: 0.2

## 2021-11-01 PROCEDURE — 81002 URINALYSIS NONAUTO W/O SCOPE: CPT | Performed by: UROLOGY

## 2021-11-01 PROCEDURE — 51798 US URINE CAPACITY MEASURE: CPT | Performed by: UROLOGY

## 2021-11-01 PROCEDURE — 99214 OFFICE O/P EST MOD 30 MIN: CPT | Performed by: UROLOGY

## 2021-11-01 RX ORDER — TAMSULOSIN HYDROCHLORIDE 0.4 MG/1
0.4 CAPSULE ORAL DAILY
Qty: 90 CAPSULE | Refills: 3 | Status: SHIPPED | OUTPATIENT
Start: 2021-11-01 | End: 2022-11-01

## 2021-11-01 ASSESSMENT — ENCOUNTER SYMPTOMS
WHEEZING: 0
EYE REDNESS: 0
VOMITING: 0
EYE DISCHARGE: 0
BACK PAIN: 0
CHEST TIGHTNESS: 0
FACIAL SWELLING: 0
SORE THROAT: 0
NAUSEA: 0

## 2021-11-01 NOTE — PROGRESS NOTES
Leonardo Client is a 48 y.o. male who presents today   Chief Complaint   Patient presents with    Follow-up     I am here for a 6 month FU. I had my PSA and uroflow study done prior. BPH / LUTS:  Patient is here today for lower urinary tract symptoms which started approximately 6 month(s) ago. Recently the urinary symptoms are: are worsening  Current medical Rx for BPH/LUTS: none  Previous treatments tried for LUTS: none  Previous surgical intervention: none  Urinary retention: No  Any associated gross hematuria? no  History of recurrent UTIs: no  His AUA Symptom Score is, 20/35   Patient states symptoms are of severe severity. Patient flow rate comes in today for follow-up after accomplishing this. Elevated PSA:  Patient is here today for history of an elevated PSA. His last several PSA values are as follows:  Lab Results   Component Value Date    PSA 2.16 02/22/2021    PSA 1.53 07/24/2020    PSA 1.32 03/18/2020     Overall the PSA level is: increased  Recent history of urinary tract infection/prostatitis? no  Previous prostate biopsy? no  His PSA has been increasing.   He had a PSA done today for this visit is pending his PSA has been below his age-adjusted cut of 2.5    Past Medical History:   Diagnosis Date    Adenoma of left adrenal gland     Hyperlipidemia     Hypertension     Hypothyroidism     Kidney stone     Multiple thyroid nodules 06/2020    Unspecified sleep apnea        Past Surgical History:   Procedure Laterality Date    APPENDECTOMY      CHOLECYSTECTOMY  2015    LITHOTRIPSY      SHOULDER DEBRIDEMENT Right 10/2013    WRIST SURGERY Left        Current Outpatient Medications   Medication Sig Dispense Refill    esomeprazole (NEXIUM) 40 MG delayed release capsule TAKE 1 CAPSULE EVERY MORNING BEFORE BREAKFAST 90 capsule 1    levothyroxine (EUTHYROX) 200 MCG tablet Take 1 tablet by mouth once daily 90 tablet 1    levothyroxine (EUTHYROX) 50 MCG tablet Take 1 tablet by mouth once daily 90 tablet 1    PROAIR  (90 Base) MCG/ACT inhaler Inhale 2 puffs into the lungs every 6 hours as needed for Wheezing 1 Inhaler 3    albuterol sulfate HFA (VENTOLIN HFA) 108 (90 Base) MCG/ACT inhaler Inhale 2 puffs into the lungs every 6 hours as needed for Wheezing 1 Inhaler 5    atorvastatin (LIPITOR) 40 MG tablet 1 po nightly for cholesterol 90 tablet 1    olmesartan (BENICAR) 20 MG tablet Take 1 tablet by mouth once daily 90 tablet 1    vitamin D (ERGOCALCIFEROL) 1.25 MG (67435 UT) CAPS capsule Take 1 capsule by mouth once a week 12 capsule 1    FLUoxetine (PROZAC) 10 MG capsule Take 1 capsule by mouth once daily 90 capsule 1    amLODIPine (NORVASC) 5 MG tablet Take 1 tablet by mouth once daily 90 tablet 1    buPROPion (WELLBUTRIN XL) 300 MG extended release tablet Take 1 tablet by mouth once daily 90 tablet 1     No current facility-administered medications for this visit. No Known Allergies    Social History     Socioeconomic History    Marital status:      Spouse name: None    Number of children: None    Years of education: None    Highest education level: None   Occupational History    None   Tobacco Use    Smoking status: Former Smoker     Quit date: 1999     Years since quittin.4    Smokeless tobacco: Never Used   Substance and Sexual Activity    Alcohol use: No    Drug use: No    Sexual activity: None   Other Topics Concern    None   Social History Narrative    None     Social Determinants of Health     Financial Resource Strain: Low Risk     Difficulty of Paying Living Expenses: Not hard at all   Food Insecurity: No Food Insecurity    Worried About Running Out of Food in the Last Year: Never true    Paula of Food in the Last Year: Never true   Transportation Needs:     Lack of Transportation (Medical):      Lack of Transportation (Non-Medical):    Physical Activity:     Days of Exercise per Week:     Minutes of Exercise per Session:    Stress:  Feeling of Stress :    Social Connections:     Frequency of Communication with Friends and Family:     Frequency of Social Gatherings with Friends and Family:     Attends Mor[employer] Services:     Active Member of Clubs or Organizations:     Attends Club or Organization Meetings:     Marital Status:    Intimate Partner Violence:     Fear of Current or Ex-Partner:     Emotionally Abused:     Physically Abused:     Sexually Abused:        Family History   Problem Relation Age of Onset    Asthma Mother     Diabetes Maternal Grandmother     Asthma Maternal Grandmother     Coronary Art Dis Father        REVIEW OF SYSTEMS:  Review of Systems   Constitutional: Negative for chills and fever. HENT: Negative for facial swelling and sore throat. Eyes: Negative for discharge and redness. Respiratory: Negative for chest tightness and wheezing. Cardiovascular: Negative for chest pain and palpitations. Gastrointestinal: Negative for nausea and vomiting. Endocrine: Negative for polyphagia and polyuria. Genitourinary: Negative for decreased urine volume, difficulty urinating, discharge, dysuria, enuresis, flank pain, frequency, genital sores, hematuria, penile pain, penile swelling, scrotal swelling, testicular pain and urgency. Musculoskeletal: Negative for back pain and neck stiffness. Skin: Negative for rash and wound. Neurological: Negative for dizziness and headaches. Hematological: Negative for adenopathy. Does not bruise/bleed easily. Psychiatric/Behavioral: Negative for confusion and hallucinations.          PHYSICAL EXAM:  Ht 6' 3\" (1.905 m)   Wt 294 lb (133.4 kg)   BMI 36.75 kg/m²   Physical Exam        DATA:  CMP:    Lab Results   Component Value Date     05/20/2021    K 4.4 05/20/2021     05/20/2021    CO2 22 05/20/2021    BUN 9 05/20/2021    CREATININE 0.7 05/20/2021    GFRAA >59 05/20/2021    LABGLOM >60 05/20/2021    GLUCOSE 135 05/20/2021    PROT 6.7 05/20/2021 LABALBU 4.2 05/20/2021    CALCIUM 9.0 05/20/2021    BILITOT <0.2 05/20/2021    ALKPHOS 86 05/20/2021    AST 23 05/20/2021    ALT 42 05/20/2021     Results for orders placed or performed in visit on 11/01/21   POCT Urinalysis no Micro   Result Value Ref Range    Color, UA yellow     Clarity, UA clear     Glucose, UA POC neg     Bilirubin, UA neg     Ketones, UA neg     Spec Grav, UA 1.020     Blood, UA POC neg     pH, UA 7.0     Protein, UA POC neg     Urobilinogen, UA 0.2     Leukocytes, UA neg     Nitrite, UA neg     Appearance, Fluid Clear Clear, Slightly Cloudy     Lab Results   Component Value Date    PSA 2.16 02/22/2021    PSA 1.53 07/24/2020    PSA 1.32 03/18/2020     No results found for: PSAFREEPCT    Flow rate started normal flow pattern was sawtooth intermittent he reports some straining but he did empty and did not seem obstructive      1. Benign prostatic hyperplasia with weak urinary stream  Subjectively his symptoms are severe we will go ahead and start him on alpha-blocker if he does not have significant provement he should have cystoscopy. - POCT Urinalysis no Micro  - HI Measure, post-void residual, US, non-imaging  - PSA, Total and Free; Future  - tamsulosin (FLOMAX) 0.4 MG capsule; Take 1 capsule by mouth daily  Dispense: 90 capsule; Refill: 3    2. Increased prostate specific antigen (PSA) velocity  PSA at the next follow-up      Orders Placed This Encounter   Procedures    PSA, Total and Free     Prior to next visit in 3 mos     Standing Status:   Future     Standing Expiration Date:   11/1/2022    POCT Urinalysis no Micro    HI Measure, post-void residual, US, non-imaging     21ml        Return in about 3 months (around 2/1/2022) for PSA prior to vext visit.     All information inputted into the note by the MA to include chief complaint, past medical history, past surgical history, medications, allergies, social and family history and review of systems has been reviewed and updated as needed by me. EMR Dragon/transcription disclaimer: Much of this documentt is electronic  transcription/translation of spoken language to printed text. The  electronic translation of spoken language may be erroneous, or at times,  nonsensical words or phrases may be inadvertently transcribed.  Although I  have reviewed the document for such errors, some may still exist.

## 2021-11-19 ENCOUNTER — OFFICE VISIT (OUTPATIENT)
Dept: FAMILY MEDICINE CLINIC | Age: 46
End: 2021-11-19
Payer: COMMERCIAL

## 2021-11-19 ENCOUNTER — HOSPITAL ENCOUNTER (OUTPATIENT)
Dept: GENERAL RADIOLOGY | Age: 46
Discharge: HOME | End: 2021-11-19
Payer: COMMERCIAL

## 2021-11-19 VITALS
TEMPERATURE: 97.1 F | SYSTOLIC BLOOD PRESSURE: 122 MMHG | RESPIRATION RATE: 16 BRPM | BODY MASS INDEX: 37 KG/M2 | HEART RATE: 78 BPM | WEIGHT: 296 LBS | OXYGEN SATURATION: 99 % | DIASTOLIC BLOOD PRESSURE: 62 MMHG

## 2021-11-19 DIAGNOSIS — M25.512 CHRONIC LEFT SHOULDER PAIN: Primary | ICD-10-CM

## 2021-11-19 DIAGNOSIS — G47.30 SLEEP APNEA, UNSPECIFIED TYPE: ICD-10-CM

## 2021-11-19 DIAGNOSIS — G89.29 CHRONIC LEFT SHOULDER PAIN: ICD-10-CM

## 2021-11-19 DIAGNOSIS — R63.8 UNABLE TO LOSE WEIGHT: ICD-10-CM

## 2021-11-19 DIAGNOSIS — M25.512 CHRONIC LEFT SHOULDER PAIN: ICD-10-CM

## 2021-11-19 DIAGNOSIS — G89.29 CHRONIC LEFT SHOULDER PAIN: Primary | ICD-10-CM

## 2021-11-19 PROCEDURE — 73030 X-RAY EXAM OF SHOULDER: CPT

## 2021-11-19 PROCEDURE — 99214 OFFICE O/P EST MOD 30 MIN: CPT | Performed by: NURSE PRACTITIONER

## 2021-11-19 ASSESSMENT — PATIENT HEALTH QUESTIONNAIRE - PHQ9
SUM OF ALL RESPONSES TO PHQ QUESTIONS 1-9: 0
1. LITTLE INTEREST OR PLEASURE IN DOING THINGS: 0
2. FEELING DOWN, DEPRESSED OR HOPELESS: 0
SUM OF ALL RESPONSES TO PHQ QUESTIONS 1-9: 0
SUM OF ALL RESPONSES TO PHQ9 QUESTIONS 1 & 2: 0
SUM OF ALL RESPONSES TO PHQ QUESTIONS 1-9: 0

## 2021-11-19 ASSESSMENT — ENCOUNTER SYMPTOMS
RESPIRATORY NEGATIVE: 1
TROUBLE SWALLOWING: 0

## 2021-11-19 NOTE — PROGRESS NOTES
SUBJECTIVE:    Patient ID: Berenice Rudd is a50 y.o. male. Berenice Rudd is here today for Shoulder Pain (left shoulder pain x  3-4months. ) and Sleep Apnea (wants to schedule sleep study)  . HPI:   HPI     Patient is here today to discuss several issues. He states that his left shoulder pain has been present for approximately 3 to 4 months. He does recall an incident where he was helping move his spouse in her wheelchair and heard a pop and then later heard a pop to the left shoulder again. Pain has been present since that time. Pain has waxed and waned since the onset but has been ongoing. Patient does report limited motion of arm and cannot raise his arm to get into refrigerator. Treatmentnone. Patient also was diagnosed with sleep apnea several years ago. He did run into an insurance issue with CPAP payment. He has been without any treatment and has had not had any follow-up oxygen studies. He is willing to now move forward with sleep study. Patient also states that urology has just started him on Flomax which she started this morning. He does state that blood pressure is approximately 20-30 points lower than it was noted at his work physical yesterday. We will recheck blood pressure. He is not reporting any weakness with today's blood pressure of 104/68. And has had right shoulder surgery in the past with Dr. Danica ortega at [address] Patient also voices frustration with his weight. He has often reported battling his weight and will kick into a cycle of healthy eating and exercise but seems to have had more difficulty getting back on this track. He is asking about Saxenda. He and I have spoken of this medication today and I have attached information on checkout paper. He will review this prior to deciding if this is the plan that he wishes to move forward with. He does have hypothyroidism which seems to complicate his weight concerns.     Past Medical History:   Diagnosis Date    Adenoma of left adrenal gland     Hyperlipidemia     Hypertension     Hypothyroidism     Kidney stone     Multiple thyroid nodules 06/2020    Unspecified sleep apnea      Prior to Visit Medications    Medication Sig Taking?  Authorizing Provider   tamsulosin (FLOMAX) 0.4 MG capsule Take 1 capsule by mouth daily Yes Lauro West MD   esomeprazole ([address]) 40 MG delayed release capsule TAKE 1 CAPSULE EVERY MORNING BEFORE BREAKFAST Yes DENISHA Ashton   levothyroxine (EUTHYROX) 200 MCG tablet Take 1 tablet by mouth once daily Yes DENISHA Ashton   levothyroxine (EUTHYROX) 50 MCG tablet Take 1 tablet by mouth once daily Yes DENISHA Ashton   albuterol sulfate HFA (VENTOLIN HFA) 108 (90 Base) MCG/ACT inhaler Inhale 2 puffs into the lungs every 6 hours as needed for Wheezing Yes DENISHA Ashton   atorvastatin (LIPITOR) 40 MG tablet 1 po nightly for cholesterol Yes DENISHA Ashton   olmesartan (BENICAR) 20 MG tablet Take 1 tablet by mouth once daily Yes DENISHA Ashton   vitamin D (ERGOCALCIFEROL) 1.25 MG (89315 UT) CAPS capsule Take 1 capsule by mouth once a week Yes DENISHA Ashton   FLUoxetine (PROZAC) 10 MG capsule Take 1 capsule by mouth once daily Yes DENISHA Ashton   amLODIPine (NORVASC) 5 MG tablet Take 1 tablet by mouth once daily Yes DENISHA Ashton   buPROPion (WELLBUTRIN XL) 300 MG extended release tablet Take 1 tablet by mouth once daily Yes DENISHA Ashton   PROAIR  (90 Base) MCG/ACT inhaler Inhale 2 puffs into the lungs every 6 hours as needed for Wheezing  DENISHA Ashton     No Known Allergies  Past Surgical History:   Procedure Laterality Date    APPENDECTOMY      CHOLECYSTECTOMY  2015    LITHOTRIPSY      SHOULDER DEBRIDEMENT Right 10/2013    WRIST SURGERY Left      Family History   Problem Relation Age of Onset    Asthma Mother     Diabetes Maternal Grandmother     Asthma Maternal Grandmother     Coronary Art Dis Father      Social History     Socioeconomic History    Marital status:      Spouse name: Not on file    Number of children: Not on file    Years of education: Not on file    Highest education level: Not on file   Occupational History    Not on file   Tobacco Use    Smoking status: Former Smoker     Quit date: 1999     Years since quittin.4    Smokeless tobacco: Never Used   Substance and Sexual Activity    Alcohol use: No    Drug use: No    Sexual activity: Not on file   Other Topics Concern    Not on file   Social History Narrative    Not on file     Social Determinants of Health     Financial Resource Strain: Low Risk     Difficulty of Paying Living Expenses: Not hard at all   Food Insecurity: No Food Insecurity    Worried About [address] in the Last Year: Never true    [address] in the Last Year: Never true   Transportation Needs:     Lack of Transportation (Medical): Not on file    Lack of Transportation (Non-Medical): Not on file   Physical Activity:     Days of Exercise per Week: Not on file    Minutes of Exercise per Session: Not on file   Stress:     Feeling of Stress : Not on file   Social Connections:     Frequency of Communication with Friends and Family: Not on file    Frequency of Social Gatherings with Friends and Family: Not on file    Attends Jewish Services: Not on file    Active Member of [address] [place] [address] or Organizations: Not on file    Attends Club or Organization Meetings: Not on file    Marital Status: Not on file   Intimate Partner Violence:     Fear of Current or Ex-Partner: Not on file    Emotionally Abused: Not on file    Physically Abused: Not on file    Sexually Abused: Not on file   Housing Stability:     Unable to Pay for Housing in the Last Year: Not on file    Number of [place] in the Last Year: Not on file    Unstable Housing in the Last Year: Not on file       Review of Systems   Constitutional: Positive for unexpected weight change. HENT: Negative for trouble swallowing. Respiratory: Negative. Cardiovascular: Negative. Musculoskeletal: Positive for arthralgias. Neurological: Negative for numbness. OBJECTIVE:    Physical Exam  Vitals and nursing note reviewed. Constitutional:       General: He is not in acute distress. Appearance: Normal appearance. He is well-developed. He is not ill-appearing, toxic-appearing or diaphoretic. HENT:      Head: Normocephalic and atraumatic. Eyes:      Extraocular Movements: Extraocular movements intact. Conjunctiva/sclera: Conjunctivae normal.      Pupils: Pupils are equal, round, and reactive to light. Neck:      Trachea: No tracheal deviation. Cardiovascular:      Rate and Rhythm: Normal rate and regular rhythm. Heart sounds: Normal heart sounds. Pulmonary:      Effort: Pulmonary effort is normal.      Breath sounds: Normal breath sounds. Musculoskeletal:      Left shoulder: No swelling or tenderness. Decreased range of motion. Cervical back: Normal range of motion and neck supple. No rigidity. Lymphadenopathy:      Cervical: No cervical adenopathy. Skin:     General: Skin is warm and dry. Capillary Refill: Capillary refill takes less than 2 seconds. Neurological:      General: No focal deficit present. Mental Status: He is alert and oriented to person, place, and time. Mental status is at baseline. Psychiatric:         Mood and Affect: Mood normal. Mood is not anxious or depressed. Affect is not angry. Speech: Speech normal.         Behavior: Behavior normal.         Thought Content: Thought content normal.         Judgment: Judgment normal.         /62 (Site: Left Upper Arm, Position: Sitting, Cuff Size: Large Adult)   Pulse 78   Temp 97.1 °F (36.2 °C) (Skin)   Resp 16   Wt 296 lb (134.3 kg)   SpO2 99%   BMI 37.00 kg/m²      ASSESSMENT:      ICD-10-CM    1.  Chronic left shoulder pain  M25.512 XR SHOULDER LEFT (MIN 2 VIEWS)    G89.29 MRI SHOULDER LEFT WO CONTRAST   2. Sleep apnea, unspecified type  G47.30 Baseline Diagnostic Sleep Study   3. Unable to lose weight  R63.8 saxenda info on avs.       PLAN:    Chika Cisse: Shoulder Pain (left shoulder pain x  3-4months. ) and Sleep Apnea (wants to schedule sleep study)  xrays today  MRI shoulder  Ortho likely  Recheck BP  Lab due next month  Sleep study  Pt would like testing before end of year. Diagnosis and orders for this visit are above. Please note that this chart was generated using dragon dictationsoftware. Although every effort was made to ensure the accuracy of this automated transcription, some errors in transcription may have occurred.

## 2021-11-19 NOTE — PATIENT INSTRUCTIONS
Patient Education        liraglutide  Pronunciation:  LIR a GLOO tide  Brand:  Merline Sharma  What is the most important information I should know about liraglutide? Do not use Saxenda and Victoza together. You should not use liraglutide if you have multiple endocrine neoplasia type 2 (tumors in your glands), a personal or family history of medullary thyroid cancer. In animal studies, liraglutide caused thyroid tumors or thyroid cancer. It is not known whether these effects would occur in people using regular doses. Call your doctor at once if you have signs of a thyroid tumor, such as swelling or a lump in your neck, trouble swallowing, a hoarse voice, or shortness of breath. What is liraglutide? The Victoza brand of liraglutide is used together with diet and exercise to improve blood sugar control in adults and children at least 8years old who have type 2 diabetes mellitus. Victoza  is also used to help reduce the risk of serious heart problems such as heart attack or stroke in adults who have type 2 diabetes and heart disease. Victoza is not for treating type 1 diabetes. The Saxenda brand of liraglutide is used together with diet and exercise to help people lose weight when they have certain health conditions. Donnell Trena is for use in adults with a body mass index (BMI) of 30 or higher, and for children at least 15years old who weigh more than 132 pounds (60 kilograms). Donnell Trena is not for treating type 1 or type 2 diabetes. Donnell Trena is not a weight-loss medicine or appetite suppressant. Liraglutide may also be used for purposes not listed in this medication guide. What should I discuss with my health care provider before using liraglutide? You should not use liraglutide if you are allergic to it, or if you have:  · multiple endocrine neoplasia type 2 (tumors in your glands); or  · a personal or family history of medullary thyroid carcinoma (a type of thyroid cancer).   You should not use Saxenda if you also use other medicines like liraglutide (albiglutide, dulaglutide, exenatide, Byetta, Bydureon, Tanzeum, Trulicity). Tell your doctor if you have ever had:  · stomach problems causing slow digestion;  · kidney or liver disease;  · high triglycerides (a type of fat in the blood);  · heart problems;  · problems with your pancreas or gallbladder; or  · (if you use Saxenda) depression or suicidal thoughts. In animal studies, liraglutide caused thyroid tumors or thyroid cancer. It is not known whether these effects would occur in people using regular doses. Ask your doctor about your risk. Do not use Saxenda if you are pregnant. Weight loss is not recommended during pregnancy, even if you are overweight. Stop using Saxenda and tell your doctor right away if you become pregnant. Follow your doctor's instructions about using Victoza if you are pregnant or you become pregnant. Controlling diabetes is very important during pregnancy, and having high blood sugar may cause complications in both the mother and the baby. It may not be safe to breastfeed while using liraglutide. Ask your doctor about any risk. How should I use liraglutide? Follow all directions on your prescription label and read all medication guides or instruction sheets. Your doctor may occasionally change your dose. Use the medicine exactly as directed. Do not use Saxenda and Victoza together. Liraglutide is injected under the skin at any time of the day, with or without a meal. A healthcare provider may teach you how to properly use the medication by yourself. Read and carefully follow any Instructions for Use provided with your medicine. Ask your doctor or pharmacist if you don't understand all instructions. Prepare an injection only when you are ready to give it. Do not use if the medicine has changed colors or has particles in it. Call your pharmacist for new medicine. Call your doctor if you are sick with vomiting or diarrhea.  You can easily become dehydrated while using liraglutide. This can lead to kidney failure. You may have low blood sugar (hypoglycemia) and feel very hungry, dizzy, irritable, confused, anxious, or shaky. To quickly treat hypoglycemia, eat or drink a fast-acting source of sugar (fruit juice, hard candy, crackers, raisins, or non-diet soda). Your doctor may prescribe a glucagon injection kit in case you have severe hypoglycemia. Be sure your family or close friends know how to give you this injection in an emergency. Also watch for signs of high blood sugar (hyperglycemia) such as increased thirst or urination. Blood sugar levels can be affected by stress, illness, surgery, exercise, alcohol use, or skipping meals. Ask your doctor before changing your dose or medication schedule. Storing unopened injection pens: Refrigerate and use until the expiration date. Storing after your first use: Store the pen in a refrigerator or at room temperature and use within 30 days. Do not freeze liraglutide, and throw away the medicine if it has become frozen. Use a needle only once and then place it in a puncture-proof \"sharps\" container. Follow state or local laws about how to dispose of this container. Keep it out of the reach of children and pets. What happens if I miss a dose? Skip the missed dose and use the next regularly scheduled dose. Do not use two doses at one time. Call your doctor for instructions if you miss 3 or more doses of Saxenda. What happens if I overdose? Seek emergency medical attention or call the Poison Help line at 1-471.183.6123. What should I avoid while using liraglutide? Never share an injection pen, cartridge, or syringe with another person, even if the needle has been changed. Sharing these devices can allow infections or disease to pass from one person to another. Do not use Saxenda  together with other weight loss products, diet pills, or appetite suppressants.   What are the possible side effects of liraglutide? Get emergency medical help if you have signs of an allergic reaction:  hives; fast heartbeats; dizziness; trouble breathing or swallowing; swelling of your face, lips, tongue, or throat. Call your doctor at once if you have:  · racing or pounding heartbeats;  · sudden changes in mood or behavior, suicidal thoughts;  · dehydration symptoms --feeling very thirsty or hot, being unable to urinate, heavy sweating, or hot and dry skin;  · low blood sugar --headache, hunger, sweating, irritability, dizziness, fast heart rate, and feeling anxious or shaky;  · gallbladder or pancreas problems --sudden and severe pain in your upper stomach that may spread to your back, nausea, vomiting, fever, jaundice (yellowing of your skin or eyes); or  · signs of a thyroid tumor --swelling or a lump in your neck, trouble swallowing, a hoarse voice, feeling short of breath. Common side effects may include:  · low blood sugar;  · nausea, vomiting, stomach discomfort, loss of appetite;  · diarrhea, constipation;  · rash;  · headache, dizziness; or  · feeling tired. This is not a complete list of side effects and others may occur. Call your doctor for medical advice about side effects. You may report side effects to FDA at 5-959-FDA-6519. What other drugs will affect liraglutide? Liraglutide can slow your digestion, and it may take longer for your body to absorb any medicines you take by mouth. Tell your doctor if you also use insulin or oral diabetes medicine. Other drugs may affect liraglutide, including prescription and over-the-counter medicines, vitamins, and herbal products. Tell your doctor about all your current medicines and any medicine you start or stop using. Where can I get more information? Your pharmacist can provide more information about liraglutide.   Remember, keep this and all other medicines out of the reach of children, never share your medicines with others, and use this medication only for the indication prescribed. Every effort has been made to ensure that the information provided by [address] is accurate, up-to-date, and complete, but no guarantee is made to that effect. Drug information contained herein may be time sensitive. [facility] information has been compiled for use by healthcare practitioners and consumers in the [facility] and therefore [facility] does not warrant that uses outside of the [facility] are appropriate, unless specifically indicated otherwise. [facility]'s drug information does not endorse drugs, diagnose patients or recommend therapy. [facility]'s drug information is an informational resource designed to assist licensed healthcare practitioners in caring for their patients and/or to serve consumers viewing this service as a supplement to, and not a substitute for, the expertise, skill, knowledge and judgment of healthcare practitioners. The absence of a warning for a given drug or drug combination in no way should be construed to indicate that the drug or drug combination is safe, effective or appropriate for any given patient. [facility] does not assume any responsibility for any aspect of healthcare administered with the aid of information [facility] provides. The information contained herein is not intended to cover all possible uses, directions, precautions, warnings, drug interactions, allergic reactions, or adverse effects. If you have questions about the drugs you are taking, check with your doctor, nurse or pharmacist.  Copyright 3404-0122 [address]: 11.01. Revision date: 12/7/2020. Care instructions adapted under license by [employer]. If you have questions about a medical condition or this instruction, always ask your healthcare professional. [place] [address] any warranty or liability for your use of this information.

## 2021-11-22 DIAGNOSIS — F41.9 ANXIETY: ICD-10-CM

## 2021-11-22 DIAGNOSIS — E55.9 VITAMIN D DEFICIENCY: ICD-10-CM

## 2021-11-22 DIAGNOSIS — I10 ESSENTIAL HYPERTENSION: ICD-10-CM

## 2021-11-22 DIAGNOSIS — E78.2 MIXED HYPERLIPIDEMIA: ICD-10-CM

## 2021-11-22 DIAGNOSIS — I10 HYPERTENSION, UNSPECIFIED TYPE: ICD-10-CM

## 2021-11-22 DIAGNOSIS — F34.1 DYSTHYMIA: ICD-10-CM

## 2021-11-23 RX ORDER — FLUOXETINE 10 MG/1
CAPSULE ORAL
Qty: 90 CAPSULE | Refills: 3 | Status: SHIPPED | OUTPATIENT
Start: 2021-11-23

## 2021-11-23 RX ORDER — ERGOCALCIFEROL 1.25 MG/1
CAPSULE ORAL
Qty: 12 CAPSULE | Refills: 3 | Status: SHIPPED | OUTPATIENT
Start: 2021-11-23

## 2021-11-23 RX ORDER — OLMESARTAN MEDOXOMIL 20 MG/1
TABLET ORAL
Qty: 90 TABLET | Refills: 3 | Status: SHIPPED | OUTPATIENT
Start: 2021-11-23

## 2021-11-23 RX ORDER — AMLODIPINE BESYLATE 5 MG/1
TABLET ORAL
Qty: 90 TABLET | Refills: 3 | Status: SHIPPED | OUTPATIENT
Start: 2021-11-23

## 2021-11-23 RX ORDER — BUPROPION HYDROCHLORIDE 300 MG/1
TABLET ORAL
Qty: 90 TABLET | Refills: 3 | Status: SHIPPED | OUTPATIENT
Start: 2021-11-23 | End: 2022-09-12 | Stop reason: SDUPTHER

## 2021-11-23 RX ORDER — ATORVASTATIN CALCIUM 40 MG/1
TABLET, FILM COATED ORAL
Qty: 90 TABLET | Refills: 3 | Status: SHIPPED | OUTPATIENT
Start: 2021-11-23

## 2021-11-24 ENCOUNTER — HOSPITAL ENCOUNTER (OUTPATIENT)
Dept: MRI IMAGING | Age: 46
Discharge: HOME | End: 2021-11-24
Payer: COMMERCIAL

## 2021-11-24 DIAGNOSIS — M25.512 CHRONIC LEFT SHOULDER PAIN: ICD-10-CM

## 2021-11-24 DIAGNOSIS — G89.29 CHRONIC LEFT SHOULDER PAIN: ICD-10-CM

## 2021-11-24 PROCEDURE — 73221 MRI JOINT UPR EXTREM W/O DYE: CPT

## 2021-12-01 ENCOUNTER — TELEPHONE (OUTPATIENT)
Dept: FAMILY MEDICINE CLINIC | Age: 46
End: 2021-12-01

## 2021-12-01 DIAGNOSIS — M67.819 TENDINOSIS OF SHOULDER: ICD-10-CM

## 2021-12-01 DIAGNOSIS — M25.412: ICD-10-CM

## 2021-12-01 DIAGNOSIS — R93.6 ABNORMAL MRI, SHOULDER: Primary | ICD-10-CM

## 2021-12-01 NOTE — TELEPHONE ENCOUNTER
Patient has seen results and has called back and said his preference is Dr. Claudia Lawrence. At [address] [place] [address]; referral pended.

## 2022-01-31 DIAGNOSIS — N40.1 BENIGN PROSTATIC HYPERPLASIA WITH WEAK URINARY STREAM: ICD-10-CM

## 2022-01-31 DIAGNOSIS — R39.12 BENIGN PROSTATIC HYPERPLASIA WITH WEAK URINARY STREAM: ICD-10-CM

## 2022-02-02 LAB
PROSTATE SPECIFIC ANTIGEN FREE: 0.5 NG/ML
PROSTATE SPECIFIC ANTIGEN PERCENT FREE: 13 %
PROSTATE SPECIFIC ANTIGEN: 3.8 NG/ML (ref 0–4)

## 2022-02-18 DIAGNOSIS — E03.8 OTHER SPECIFIED HYPOTHYROIDISM: ICD-10-CM

## 2022-02-18 RX ORDER — LEVOTHYROXINE SODIUM 0.05 MG/1
TABLET ORAL
Qty: 90 TABLET | Refills: 3 | Status: SHIPPED | OUTPATIENT
Start: 2022-02-18 | End: 2022-09-12 | Stop reason: SDUPTHER

## 2022-02-18 RX ORDER — LEVOTHYROXINE SODIUM 0.2 MG/1
TABLET ORAL
Qty: 90 TABLET | Refills: 3 | Status: SHIPPED | OUTPATIENT
Start: 2022-02-18 | End: 2022-09-12 | Stop reason: SDUPTHER

## 2022-02-18 RX ORDER — ESOMEPRAZOLE MAGNESIUM 40 MG/1
CAPSULE, DELAYED RELEASE ORAL
Qty: 90 CAPSULE | Refills: 3 | Status: SHIPPED | OUTPATIENT
Start: 2022-02-18 | End: 2022-09-12 | Stop reason: SDUPTHER

## 2022-03-28 ENCOUNTER — OFFICE VISIT (OUTPATIENT)
Dept: UROLOGY | Age: 47
End: 2022-03-28
Payer: COMMERCIAL

## 2022-03-28 VITALS
DIASTOLIC BLOOD PRESSURE: 86 MMHG | HEIGHT: 75 IN | HEART RATE: 57 BPM | OXYGEN SATURATION: 97 % | TEMPERATURE: 97.1 F | BODY MASS INDEX: 38.42 KG/M2 | WEIGHT: 309 LBS | SYSTOLIC BLOOD PRESSURE: 138 MMHG

## 2022-03-28 DIAGNOSIS — N40.1 BENIGN PROSTATIC HYPERPLASIA WITH WEAK URINARY STREAM: Primary | ICD-10-CM

## 2022-03-28 DIAGNOSIS — R97.20 INCREASED PROSTATE SPECIFIC ANTIGEN (PSA) VELOCITY: ICD-10-CM

## 2022-03-28 DIAGNOSIS — R39.12 BENIGN PROSTATIC HYPERPLASIA WITH WEAK URINARY STREAM: Primary | ICD-10-CM

## 2022-03-28 LAB
APPEARANCE FLUID: CLEAR
BILIRUBIN, POC: NORMAL
BLOOD URINE, POC: NORMAL
CLARITY, POC: CLEAR
COLOR, POC: YELLOW
GLUCOSE URINE, POC: 100
KETONES, POC: NORMAL
LEUKOCYTE EST, POC: NORMAL
NITRITE, POC: NORMAL
PH, POC: 5
PROTEIN, POC: NORMAL
SPECIFIC GRAVITY, POC: 1.03
UROBILINOGEN, POC: 0.2

## 2022-03-28 PROCEDURE — 99214 OFFICE O/P EST MOD 30 MIN: CPT | Performed by: UROLOGY

## 2022-03-28 PROCEDURE — 81002 URINALYSIS NONAUTO W/O SCOPE: CPT | Performed by: UROLOGY

## 2022-03-28 ASSESSMENT — ENCOUNTER SYMPTOMS
EYE DISCHARGE: 0
NAUSEA: 0
CHEST TIGHTNESS: 0
EYE REDNESS: 0
FACIAL SWELLING: 0
VOMITING: 0
WHEEZING: 0
SORE THROAT: 0
BACK PAIN: 0

## 2022-03-28 NOTE — PROGRESS NOTES
Cheyenne Linton is a 48 y.o. male who presents today   Chief Complaint   Patient presents with    Follow-up     I am here today for a 4 month FU. I had my PSA done prior. Elevated PSA:  Patient is here today for history of an elevated PSA. His last several PSA values are as follows:  Lab Results   Component Value Date    PSA 3.8 01/31/2022    PSA 1.5 10/29/2021    PSA 2.16 02/22/2021    PSA 1.53 07/24/2020    PSA 1.32 03/18/2020     Overall the PSA level is: increased  Recent history of urinary tract infection/prostatitis? no he states he was taking some meloxicam and some ibuprofen for some musculoskeletal problems  Previous prostate biopsy? no  Lower urinary tract symptoms: urgency and decreased urinary stream    BPH / LUTS:   Patient is here today for lower urinary tract symptoms which started approximately 1 years(s) ago. Recently the urinary symptoms are: are improving after starting tamsulosin  Current medical Rx for BPH/LUTS: Tamsulosin 0.4 mg  Previous treatments tried for LUTS: Medical therapy with alpha-blocker  Previous surgical intervention: none  Urinary retention: No  Any associated gross hematuria? no  History of recurrent UTIs: no  His AUA Symptom Score is, 9/35   Patient states symptoms are of moderate severity.     Past Medical History:   Diagnosis Date    Adenoma of left adrenal gland     Hyperlipidemia     Hypertension     Hypothyroidism     Kidney stone     Multiple thyroid nodules 06/2020    Unspecified sleep apnea        Past Surgical History:   Procedure Laterality Date    APPENDECTOMY      CHOLECYSTECTOMY  2015    LITHOTRIPSY      SHOULDER DEBRIDEMENT Right 10/2013    WRIST SURGERY Left        Current Outpatient Medications   Medication Sig Dispense Refill    levothyroxine (SYNTHROID) 200 MCG tablet TAKE 1 TABLET DAILY 90 tablet 3    esomeprazole (NEXIUM) 40 MG delayed release capsule TAKE 1 CAPSULE EVERY MORNING BEFORE BREAKFAST 90 capsule 3    levothyroxine (SYNTHROID) 50 MCG tablet TAKE 1 TABLET DAILY 90 tablet 3    atorvastatin (LIPITOR) 40 MG tablet TAKE 1 TABLET NIGHTLY FOR CHOLESTEROL 90 tablet 3    vitamin D (ERGOCALCIFEROL) 1.25 MG (11925 UT) CAPS capsule TAKE 1 CAPSULE ONCE A WEEK 12 capsule 3    FLUoxetine (PROZAC) 10 MG capsule TAKE 1 CAPSULE DAILY 90 capsule 3    olmesartan (BENICAR) 20 MG tablet TAKE 1 TABLET DAILY 90 tablet 3    buPROPion (WELLBUTRIN XL) 300 MG extended release tablet TAKE 1 TABLET DAILY 90 tablet 3    amLODIPine (NORVASC) 5 MG tablet TAKE 1 TABLET DAILY 90 tablet 3    tamsulosin (FLOMAX) 0.4 MG capsule Take 1 capsule by mouth daily 90 capsule 3    PROAIR  (90 Base) MCG/ACT inhaler Inhale 2 puffs into the lungs every 6 hours as needed for Wheezing 1 Inhaler 3    albuterol sulfate HFA (VENTOLIN HFA) 108 (90 Base) MCG/ACT inhaler Inhale 2 puffs into the lungs every 6 hours as needed for Wheezing 1 Inhaler 5     No current facility-administered medications for this visit. No Known Allergies    Social History     Socioeconomic History    Marital status:      Spouse name: None    Number of children: None    Years of education: None    Highest education level: None   Occupational History    None   Tobacco Use    Smoking status: Former Smoker     Quit date: 1999     Years since quittin.8    Smokeless tobacco: Never Used   Substance and Sexual Activity    Alcohol use: No    Drug use: No    Sexual activity: None   Other Topics Concern    None   Social History Narrative    None     Social Determinants of Health     Financial Resource Strain: Low Risk     Difficulty of Paying Living Expenses: Not hard at all   Food Insecurity: No Food Insecurity    Worried About Running Out of Food in the Last Year: Never true    Paula of Food in the Last Year: Never true   Transportation Needs:     Lack of Transportation (Medical): Not on file    Lack of Transportation (Non-Medical):  Not on file   Physical Activity:     Days of Exercise per Week: Not on file    Minutes of Exercise per Session: Not on file   Stress:     Feeling of Stress : Not on file   Social Connections:     Frequency of Communication with Friends and Family: Not on file    Frequency of Social Gatherings with Friends and Family: Not on file    Attends Baptism Services: Not on file    Active Member of [address] [place] [address] [employer] or Organizations: Not on file    Attends Club or Organization Meetings: Not on file    Marital Status: Not on file   Intimate Partner Violence:     Fear of Current or Ex-Partner: Not on file    Emotionally Abused: Not on file    Physically Abused: Not on file    Sexually Abused: Not on file   Housing Stability:     Unable to Pay for Housing in the Last Year: Not on file    Number of [place] in the Last Year: Not on file    Unstable Housing in the Last Year: Not on file       Family History   Problem Relation Age of Onset    Asthma Mother     Diabetes Maternal Grandmother     Asthma Maternal Grandmother     Coronary Art Dis Father        REVIEW OF SYSTEMS:  Review of Systems   Constitutional: Negative for chills and fever. HENT: Negative for facial swelling and sore throat. Eyes: Negative for discharge and redness. Respiratory: Negative for chest tightness and wheezing. Cardiovascular: Negative for chest pain and palpitations. Gastrointestinal: Negative for nausea and vomiting. Endocrine: Negative for polyphagia and polyuria. Genitourinary: Negative for decreased urine volume, difficulty urinating, dysuria, enuresis, flank pain, frequency, genital sores, hematuria, penile discharge, penile pain, penile swelling, scrotal swelling, testicular pain and urgency. Musculoskeletal: Negative for back pain and neck stiffness. Skin: Negative for rash and wound. Neurological: Negative for dizziness and headaches. Hematological: Negative for adenopathy. Does not bruise/bleed easily.    Psychiatric/Behavioral: Negative for confusion and hallucinations. PHYSICAL EXAM:  /86   Pulse 57   Temp 97.1 °F (36.2 °C)   Ht 6' 3\" (1.905 m)   Wt (!) 309 lb (140.2 kg)   SpO2 97%   BMI 38.62 kg/m²   Physical Exam        DATA:  CMP:    Lab Results   Component Value Date     05/20/2021    K 4.4 05/20/2021     05/20/2021    CO2 22 05/20/2021    BUN 9 05/20/2021    CREATININE 0.7 05/20/2021    GFRAA >59 05/20/2021    LABGLOM >60 05/20/2021    GLUCOSE 135 05/20/2021    PROT 6.7 05/20/2021    LABALBU 4.2 05/20/2021    CALCIUM 9.0 05/20/2021    BILITOT <0.2 05/20/2021    ALKPHOS 86 05/20/2021    AST 23 05/20/2021    ALT 42 05/20/2021     Results for orders placed or performed in visit on 03/28/22   POCT Urinalysis no Micro   Result Value Ref Range    Color, UA yellow     Clarity, UA clear     Glucose, UA      Bilirubin, UA neg     Ketones, UA neg     Spec Grav, UA 1.030     Blood, UA POC neg     pH, UA 5.0     Protein, UA POC neg     Urobilinogen, UA 0.2     Leukocytes, UA neg     Nitrite, UA neg     Appearance, Fluid Clear Clear, Slightly Cloudy     Lab Results   Component Value Date    PSA 3.8 01/31/2022    PSA 1.5 10/29/2021    PSA 2.16 02/22/2021    PSA 1.53 07/24/2020    PSA 1.32 03/18/2020     Lab Results   Component Value Date    PSAFREEPCT 13 01/31/2022    PSAFREEPCT 20 10/29/2021       1. Benign prostatic hyperplasia with weak urinary stream  He says he has had significant improvement on tamsulosin  - POCT Urinalysis no Micro    2. Increased prostate specific antigen (PSA) velocity  PSA has gone back down to baseline but now back up.   We will repeat this in 6 weeks if this is not back below his age-adjusted cut of 2.5 I recommend a biopsy he understood  - PSA, Total and Free; Future      Orders Placed This Encounter   Procedures    PSA, Total and Free     Prior to next visit     Standing Status:   Future     Standing Expiration Date:   3/28/2023    POCT Urinalysis no Micro        Return in about 6 weeks (around 5/9/2022) for PSA prior to vext visit. All information inputted into the note by the MA to include chief complaint, past medical history, past surgical history, medications, allergies, social and family history and review of systems has been reviewed and updated as needed by me. EMR Dragon/transcription disclaimer: Much of this documentt is electronic  transcription/translation of spoken language to printed text. The  electronic translation of spoken language may be erroneous, or at times,  nonsensical words or phrases may be inadvertently transcribed.  Although I  have reviewed the document for such errors, some may still exist.

## 2022-04-29 DIAGNOSIS — R73.01 IFG (IMPAIRED FASTING GLUCOSE): ICD-10-CM

## 2022-04-29 DIAGNOSIS — R97.20 INCREASED PROSTATE SPECIFIC ANTIGEN (PSA) VELOCITY: ICD-10-CM

## 2022-04-29 DIAGNOSIS — E03.8 OTHER SPECIFIED HYPOTHYROIDISM: ICD-10-CM

## 2022-04-29 DIAGNOSIS — E55.9 VITAMIN D DEFICIENCY: ICD-10-CM

## 2022-04-29 LAB
ALBUMIN SERPL-MCNC: 4.6 G/DL (ref 3.5–5.2)
ALP BLD-CCNC: 81 U/L (ref 40–130)
ALT SERPL-CCNC: 61 U/L (ref 5–41)
ANION GAP SERPL CALCULATED.3IONS-SCNC: 13 MMOL/L (ref 7–19)
AST SERPL-CCNC: 31 U/L (ref 5–40)
BILIRUB SERPL-MCNC: 0.6 MG/DL (ref 0.2–1.2)
BUN BLDV-MCNC: 8 MG/DL (ref 6–20)
CALCIUM SERPL-MCNC: 9.4 MG/DL (ref 8.6–10)
CHLORIDE BLD-SCNC: 102 MMOL/L (ref 98–111)
CO2: 22 MMOL/L (ref 22–29)
CREAT SERPL-MCNC: 0.8 MG/DL (ref 0.5–1.2)
GFR AFRICAN AMERICAN: >59
GFR NON-AFRICAN AMERICAN: >60
GLUCOSE BLD-MCNC: 90 MG/DL (ref 74–109)
HBA1C MFR BLD: 5.8 % (ref 4–6)
POTASSIUM SERPL-SCNC: 4.6 MMOL/L (ref 3.5–5)
SODIUM BLD-SCNC: 137 MMOL/L (ref 136–145)
TOTAL PROTEIN: 6.5 G/DL (ref 6.6–8.7)
TSH REFLEX FT4: 2.93 UIU/ML (ref 0.35–5.5)
VITAMIN D 25-HYDROXY: 32.6 NG/ML

## 2022-05-02 LAB
PROSTATE SPECIFIC ANTIGEN FREE: 0.3 NG/ML
PROSTATE SPECIFIC ANTIGEN PERCENT FREE: 13 %
PROSTATE SPECIFIC ANTIGEN: 2.3 NG/ML (ref 0–4)

## 2022-05-09 ENCOUNTER — OFFICE VISIT (OUTPATIENT)
Dept: UROLOGY | Age: 47
End: 2022-05-09
Payer: COMMERCIAL

## 2022-05-09 VITALS
WEIGHT: 302 LBS | OXYGEN SATURATION: 97 % | TEMPERATURE: 97.9 F | HEIGHT: 75 IN | HEART RATE: 73 BPM | BODY MASS INDEX: 37.55 KG/M2 | DIASTOLIC BLOOD PRESSURE: 72 MMHG | SYSTOLIC BLOOD PRESSURE: 134 MMHG

## 2022-05-09 DIAGNOSIS — N40.1 BENIGN PROSTATIC HYPERPLASIA WITH WEAK URINARY STREAM: Primary | ICD-10-CM

## 2022-05-09 DIAGNOSIS — R97.20 INCREASED PROSTATE SPECIFIC ANTIGEN (PSA) VELOCITY: ICD-10-CM

## 2022-05-09 DIAGNOSIS — R39.12 BENIGN PROSTATIC HYPERPLASIA WITH WEAK URINARY STREAM: Primary | ICD-10-CM

## 2022-05-09 LAB
APPEARANCE FLUID: CLEAR
BILIRUBIN, POC: NORMAL
BLOOD URINE, POC: NORMAL
CLARITY, POC: CLEAR
COLOR, POC: YELLOW
GLUCOSE URINE, POC: NORMAL
KETONES, POC: NORMAL
LEUKOCYTE EST, POC: NORMAL
NITRITE, POC: NORMAL
PH, POC: 6
PROTEIN, POC: NORMAL
SPECIFIC GRAVITY, POC: 1
UROBILINOGEN, POC: 0.2

## 2022-05-09 PROCEDURE — 81002 URINALYSIS NONAUTO W/O SCOPE: CPT | Performed by: UROLOGY

## 2022-05-09 PROCEDURE — 99214 OFFICE O/P EST MOD 30 MIN: CPT | Performed by: UROLOGY

## 2022-05-09 ASSESSMENT — ENCOUNTER SYMPTOMS
WHEEZING: 0
EYE DISCHARGE: 0
NAUSEA: 0
CHEST TIGHTNESS: 0
FACIAL SWELLING: 0
SORE THROAT: 0
VOMITING: 0
BACK PAIN: 0
EYE REDNESS: 0

## 2022-05-09 NOTE — PROGRESS NOTES
Maury Denise is a 50 y.o. male who presents today   Chief Complaint   Patient presents with    Follow-up     I am here today for a 6 week FU. I had my PSA done prior. Elevated PSA:  Patient is here today for history of an elevated PSA. His last several PSA values are as follows:  Lab Results   Component Value Date    PSA 2.3 04/29/2022    PSA 3.8 01/31/2022    PSA 1.5 10/29/2021    PSA 2.16 02/22/2021    PSA 1.53 07/24/2020     Overall the PSA level is: decreased  Recent history of urinary tract infection/prostatitis? no  Previous prostate biopsy? no  Lower urinary tract symptoms: frequency, decreased urinary stream and nocturia, 1 times per night    BPH / LUTS:  Patient is here today for lower urinary tract symptoms which started approximately 1 years(s) ago. Recently the urinary symptoms are: are improving  Current medical Rx for BPH/LUTS: Tamsulosin 0.4 mg  Previous treatments tried for LUTS: Medical therapy with alpha-blocker  Previous surgical intervention: none  Urinary retention: No  Any associated gross hematuria? no  History of recurrent UTIs: no  His AUA Symptom Score is, 7/35   Patient states symptoms are of mild severity.     Past Medical History:   Diagnosis Date    Adenoma of left adrenal gland     Hyperlipidemia     Hypertension     Hypothyroidism     Kidney stone     Multiple thyroid nodules 06/2020    Unspecified sleep apnea        Past Surgical History:   Procedure Laterality Date    APPENDECTOMY      CHOLECYSTECTOMY  2015    LITHOTRIPSY      SHOULDER DEBRIDEMENT Right 10/2013    WRIST SURGERY Left        Current Outpatient Medications   Medication Sig Dispense Refill    levothyroxine (SYNTHROID) 200 MCG tablet TAKE 1 TABLET DAILY 90 tablet 3    esomeprazole (NEXIUM) 40 MG delayed release capsule TAKE 1 CAPSULE EVERY MORNING BEFORE BREAKFAST 90 capsule 3    levothyroxine (SYNTHROID) 50 MCG tablet TAKE 1 TABLET DAILY 90 tablet 3    atorvastatin (LIPITOR) 40 MG tablet TAKE 1 TABLET NIGHTLY FOR CHOLESTEROL 90 tablet 3    vitamin D (ERGOCALCIFEROL) 1.25 MG (40252 UT) CAPS capsule TAKE 1 CAPSULE ONCE A WEEK 12 capsule 3    FLUoxetine (PROZAC) 10 MG capsule TAKE 1 CAPSULE DAILY 90 capsule 3    olmesartan (BENICAR) 20 MG tablet TAKE 1 TABLET DAILY 90 tablet 3    buPROPion (WELLBUTRIN XL) 300 MG extended release tablet TAKE 1 TABLET DAILY 90 tablet 3    amLODIPine (NORVASC) 5 MG tablet TAKE 1 TABLET DAILY 90 tablet 3    tamsulosin (FLOMAX) 0.4 MG capsule Take 1 capsule by mouth daily 90 capsule 3    PROAIR  (90 Base) MCG/ACT inhaler Inhale 2 puffs into the lungs every 6 hours as needed for Wheezing 1 Inhaler 3    albuterol sulfate HFA (VENTOLIN HFA) 108 (90 Base) MCG/ACT inhaler Inhale 2 puffs into the lungs every 6 hours as needed for Wheezing 1 Inhaler 5     No current facility-administered medications for this visit. No Known Allergies    Social History     Socioeconomic History    Marital status:      Spouse name: None    Number of children: None    Years of education: None    Highest education level: None   Occupational History    None   Tobacco Use    Smoking status: Former Smoker     Quit date: 1999     Years since quittin.9    Smokeless tobacco: Never Used   Substance and Sexual Activity    Alcohol use: No    Drug use: No    Sexual activity: None   Other Topics Concern    None   Social History Narrative    None     Social Determinants of Health     Financial Resource Strain: Low Risk     Difficulty of Paying Living Expenses: Not hard at all   Food Insecurity: No Food Insecurity    Worried About Running Out of Food in the Last Year: Never true    Paula of Food in the Last Year: Never true   Transportation Needs:     Lack of Transportation (Medical): Not on file    Lack of Transportation (Non-Medical):  Not on file   Physical Activity:     Days of Exercise per Week: Not on file    Minutes of Exercise per Session: Not on file Stress:     Feeling of Stress : Not on file   Social Connections:     Frequency of Communication with Friends and Family: Not on file    Frequency of Social Gatherings with Friends and Family: Not on file    Attends Muslim Services: Not on file    Active Member of [address] [place] [address] [employer] or Organizations: Not on file    Attends Club or Organization Meetings: Not on file    Marital Status: Not on file   Intimate Partner Violence:     Fear of Current or Ex-Partner: Not on file    Emotionally Abused: Not on file    Physically Abused: Not on file    Sexually Abused: Not on file   Housing Stability:     Unable to Pay for Housing in the Last Year: Not on file    Number of [place] in the Last Year: Not on file    Unstable Housing in the Last Year: Not on file       Family History   Problem Relation Age of Onset    Asthma Mother     Diabetes Maternal Grandmother     Asthma Maternal Grandmother     Coronary Art Dis Father        REVIEW OF SYSTEMS:  Review of Systems   Constitutional: Negative for chills and fever. HENT: Negative for facial swelling and sore throat. Eyes: Negative for discharge and redness. Respiratory: Negative for chest tightness and wheezing. Cardiovascular: Negative for chest pain and palpitations. Gastrointestinal: Negative for nausea and vomiting. Endocrine: Negative for polyphagia and polyuria. Genitourinary: Negative for decreased urine volume, difficulty urinating, dysuria, enuresis, flank pain, frequency, genital sores, hematuria, penile discharge, penile pain, penile swelling, scrotal swelling, testicular pain and urgency. Musculoskeletal: Negative for back pain and neck stiffness. Skin: Negative for rash and wound. Neurological: Negative for dizziness and headaches. Hematological: Negative for adenopathy. Does not bruise/bleed easily. Psychiatric/Behavioral: Negative for confusion and hallucinations.          PHYSICAL EXAM:  /72   Pulse 73   Temp 97.9 °F (36.6 °C)   Ht 6' 3\" (1.905 m)   Wt (!) 302 lb (137 kg)   SpO2 97%   BMI 37.75 kg/m²   Physical Exam  Constitutional:       General: He is not in acute distress. Appearance: Normal appearance. He is well-developed. HENT:      Head: Normocephalic and atraumatic. Nose: Nose normal.   Eyes:      General: No scleral icterus. Conjunctiva/sclera: Conjunctivae normal.      Pupils: Pupils are equal, round, and reactive to light. Neck:      Trachea: No tracheal deviation. Cardiovascular:      Rate and Rhythm: Normal rate and regular rhythm. Pulmonary:      Effort: Pulmonary effort is normal. No respiratory distress. Breath sounds: No stridor. Abdominal:      General: There is no distension. Palpations: Abdomen is soft. There is no mass. Tenderness: There is no abdominal tenderness. Musculoskeletal:         General: No tenderness. Normal range of motion. Cervical back: Normal range of motion and neck supple. Lymphadenopathy:      Cervical: No cervical adenopathy. Skin:     General: Skin is warm and dry. Findings: No erythema. Neurological:      Mental Status: He is alert and oriented to person, place, and time.    Psychiatric:         Behavior: Behavior normal.         Judgment: Judgment normal.             DATA:  CMP:    Lab Results   Component Value Date     04/29/2022    K 4.6 04/29/2022    K 4.4 05/20/2021     04/29/2022    CO2 22 04/29/2022    BUN 8 04/29/2022    CREATININE 0.8 04/29/2022    GFRAA >59 04/29/2022    LABGLOM >60 04/29/2022    GLUCOSE 90 04/29/2022    PROT 6.5 04/29/2022    LABALBU 4.6 04/29/2022    CALCIUM 9.4 04/29/2022    BILITOT 0.6 04/29/2022    ALKPHOS 81 04/29/2022    AST 31 04/29/2022    ALT 61 04/29/2022     Results for orders placed or performed in visit on 05/09/22   POCT Urinalysis no Micro   Result Value Ref Range    Color, UA yellow     Clarity, UA clear     Glucose, UA POC neg     Bilirubin, UA neg     Ketones, UA neg Spec Grav, UA 1.005     Blood, UA POC neg     pH, UA 6.0     Protein, UA POC neg     Urobilinogen, UA 0.2     Leukocytes, UA neg     Nitrite, UA neg     Appearance, Fluid Clear Clear, Slightly Cloudy     Lab Results   Component Value Date    PSA 2.3 04/29/2022    PSA 3.8 01/31/2022    PSA 1.5 10/29/2021    PSA 2.16 02/22/2021    PSA 1.53 07/24/2020     Lab Results   Component Value Date    PSAFREEPCT 13 04/29/2022    PSAFREEPCT 13 01/31/2022    PSAFREEPCT 20 10/29/2021         1. Benign prostatic hyperplasia with weak urinary stream  He continues to benefit from taking tamsulosin. - POCT Urinalysis no Micro    2. Increased prostate specific antigen (PSA)   His PSA is back down to need to monitor PSA  - PSA, Total and Free; Future      Orders Placed This Encounter   Procedures    PSA, Total and Free     Prior to next visit in 6 months     Standing Status:   Future     Standing Expiration Date:   5/9/2023    POCT Urinalysis no Micro        Return in about 6 months (around 11/9/2022) for PSA prior to vext visit. All information inputted into the note by the MA to include chief complaint, past medical history, past surgical history, medications, allergies, social and family history and review of systems has been reviewed and updated as needed by me. EMR Dragon/transcription disclaimer: Much of this documentt is electronic  transcription/translation of spoken language to printed text. The  electronic translation of spoken language may be erroneous, or at times,  nonsensical words or phrases may be inadvertently transcribed.  Although I  have reviewed the document for such errors, some may still exist.

## 2022-09-12 ENCOUNTER — TELEPHONE (OUTPATIENT)
Dept: FAMILY MEDICINE CLINIC | Age: 47
End: 2022-09-12

## 2022-09-12 DIAGNOSIS — E03.8 OTHER SPECIFIED HYPOTHYROIDISM: ICD-10-CM

## 2022-09-12 DIAGNOSIS — F34.1 DYSTHYMIA: ICD-10-CM

## 2022-09-12 RX ORDER — LEVOTHYROXINE SODIUM 0.2 MG/1
TABLET ORAL
Qty: 90 TABLET | Refills: 3 | Status: SHIPPED | OUTPATIENT
Start: 2022-09-12

## 2022-09-12 RX ORDER — LEVOTHYROXINE SODIUM 0.05 MG/1
TABLET ORAL
Qty: 90 TABLET | Refills: 3 | Status: SHIPPED | OUTPATIENT
Start: 2022-09-12

## 2022-09-12 RX ORDER — BUPROPION HYDROCHLORIDE 300 MG/1
TABLET ORAL
Qty: 90 TABLET | Refills: 3 | Status: SHIPPED | OUTPATIENT
Start: 2022-09-12

## 2022-09-12 RX ORDER — ESOMEPRAZOLE MAGNESIUM 40 MG/1
CAPSULE, DELAYED RELEASE ORAL
Qty: 90 CAPSULE | Refills: 3 | Status: SHIPPED | OUTPATIENT
Start: 2022-09-12

## 2022-09-12 NOTE — TELEPHONE ENCOUNTER
Patient is only switching a few prescriptions to [facility]. Rx's pended for refills. Patient will call and schedule an appointment before November.

## 2022-11-02 ENCOUNTER — PATIENT MESSAGE (OUTPATIENT)
Dept: FAMILY MEDICINE CLINIC | Age: 47
End: 2022-11-02

## 2022-11-02 DIAGNOSIS — I10 ESSENTIAL HYPERTENSION: ICD-10-CM

## 2022-11-02 DIAGNOSIS — E78.2 MIXED HYPERLIPIDEMIA: ICD-10-CM

## 2022-11-02 DIAGNOSIS — E55.9 VITAMIN D DEFICIENCY: Primary | ICD-10-CM

## 2022-11-02 DIAGNOSIS — R73.01 IFG (IMPAIRED FASTING GLUCOSE): ICD-10-CM

## 2022-11-02 NOTE — TELEPHONE ENCOUNTER
From: Tosha Andre  To: Sarah Zimmer  Sent: 11/2/2022 10:05 AM CDT  Subject: Bloodwork    Hi, I am thinking that Im about due to come in for an office visit. I wanted to see if there might already be an order in place for whatever bloodwork I need to get done before coming in.    Thanks,  [address] [place] [address] Avenue

## 2022-11-07 DIAGNOSIS — E55.9 VITAMIN D DEFICIENCY: ICD-10-CM

## 2022-11-07 DIAGNOSIS — R97.20 INCREASED PROSTATE SPECIFIC ANTIGEN (PSA) VELOCITY: ICD-10-CM

## 2022-11-07 DIAGNOSIS — R73.01 IFG (IMPAIRED FASTING GLUCOSE): ICD-10-CM

## 2022-11-07 DIAGNOSIS — I10 ESSENTIAL HYPERTENSION: ICD-10-CM

## 2022-11-07 DIAGNOSIS — E78.2 MIXED HYPERLIPIDEMIA: ICD-10-CM

## 2022-11-07 LAB
ALBUMIN SERPL-MCNC: 4.5 G/DL (ref 3.5–5.2)
ALP BLD-CCNC: 78 U/L (ref 40–130)
ALT SERPL-CCNC: 27 U/L (ref 5–41)
ANION GAP SERPL CALCULATED.3IONS-SCNC: 15 MMOL/L (ref 7–19)
AST SERPL-CCNC: 17 U/L (ref 5–40)
BASOPHILS ABSOLUTE: 0.1 K/UL (ref 0–0.2)
BASOPHILS RELATIVE PERCENT: 1.1 % (ref 0–1)
BILIRUB SERPL-MCNC: 0.5 MG/DL (ref 0.2–1.2)
BUN BLDV-MCNC: 14 MG/DL (ref 6–20)
CALCIUM SERPL-MCNC: 9.3 MG/DL (ref 8.6–10)
CHLORIDE BLD-SCNC: 107 MMOL/L (ref 98–111)
CHOLESTEROL, TOTAL: 160 MG/DL (ref 160–199)
CO2: 22 MMOL/L (ref 22–29)
CREAT SERPL-MCNC: 0.8 MG/DL (ref 0.5–1.2)
EOSINOPHILS ABSOLUTE: 0.2 K/UL (ref 0–0.6)
EOSINOPHILS RELATIVE PERCENT: 3 % (ref 0–5)
GFR SERPL CREATININE-BSD FRML MDRD: >60 ML/MIN/{1.73_M2}
GLUCOSE BLD-MCNC: 94 MG/DL (ref 74–109)
HBA1C MFR BLD: 5.6 % (ref 4–6)
HCT VFR BLD CALC: 45.2 % (ref 42–52)
HDLC SERPL-MCNC: 43 MG/DL (ref 55–121)
HEMOGLOBIN: 14.8 G/DL (ref 14–18)
IMMATURE GRANULOCYTES #: 0 K/UL
LDL CHOLESTEROL CALCULATED: 103 MG/DL
LYMPHOCYTES ABSOLUTE: 2.1 K/UL (ref 1.1–4.5)
LYMPHOCYTES RELATIVE PERCENT: 33.4 % (ref 20–40)
MCH RBC QN AUTO: 30.6 PG (ref 27–31)
MCHC RBC AUTO-ENTMCNC: 32.7 G/DL (ref 33–37)
MCV RBC AUTO: 93.4 FL (ref 80–94)
MONOCYTES ABSOLUTE: 0.7 K/UL (ref 0–0.9)
MONOCYTES RELATIVE PERCENT: 11.3 % (ref 0–10)
NEUTROPHILS ABSOLUTE: 3.2 K/UL (ref 1.5–7.5)
NEUTROPHILS RELATIVE PERCENT: 51 % (ref 50–65)
PDW BLD-RTO: 13 % (ref 11.5–14.5)
PLATELET # BLD: 306 K/UL (ref 130–400)
PMV BLD AUTO: 10.5 FL (ref 9.4–12.4)
POTASSIUM REFLEX MAGNESIUM: 4.1 MMOL/L (ref 3.5–5)
RBC # BLD: 4.84 M/UL (ref 4.7–6.1)
SODIUM BLD-SCNC: 144 MMOL/L (ref 136–145)
TOTAL PROTEIN: 6.6 G/DL (ref 6.6–8.7)
TRIGL SERPL-MCNC: 70 MG/DL (ref 0–149)
TSH REFLEX FT4: 1.71 UIU/ML (ref 0.35–5.5)
VITAMIN D 25-HYDROXY: 46.9 NG/ML
WBC # BLD: 6.3 K/UL (ref 4.8–10.8)

## 2022-11-09 LAB
PROSTATE SPECIFIC ANTIGEN FREE: 0.3 NG/ML
PROSTATE SPECIFIC ANTIGEN PERCENT FREE: 18 %
PROSTATE SPECIFIC ANTIGEN: 1.7 NG/ML (ref 0–4)

## 2022-11-14 ENCOUNTER — HOSPITAL ENCOUNTER (OUTPATIENT)
Dept: GENERAL RADIOLOGY | Age: 47
Discharge: HOME | End: 2022-11-14
Payer: COMMERCIAL

## 2022-11-14 ENCOUNTER — OFFICE VISIT (OUTPATIENT)
Dept: FAMILY MEDICINE CLINIC | Age: 47
End: 2022-11-14
Payer: COMMERCIAL

## 2022-11-14 VITALS
SYSTOLIC BLOOD PRESSURE: 122 MMHG | WEIGHT: 258.38 LBS | HEIGHT: 75 IN | HEART RATE: 70 BPM | OXYGEN SATURATION: 98 % | DIASTOLIC BLOOD PRESSURE: 70 MMHG | TEMPERATURE: 97.9 F | BODY MASS INDEX: 32.13 KG/M2

## 2022-11-14 DIAGNOSIS — M25.521 RIGHT ELBOW PAIN: ICD-10-CM

## 2022-11-14 DIAGNOSIS — E55.9 VITAMIN D DEFICIENCY: ICD-10-CM

## 2022-11-14 DIAGNOSIS — E78.2 MIXED HYPERLIPIDEMIA: ICD-10-CM

## 2022-11-14 DIAGNOSIS — F41.9 ANXIETY: ICD-10-CM

## 2022-11-14 DIAGNOSIS — M25.521 RIGHT ELBOW PAIN: Primary | ICD-10-CM

## 2022-11-14 DIAGNOSIS — I10 ESSENTIAL HYPERTENSION: ICD-10-CM

## 2022-11-14 PROCEDURE — 3074F SYST BP LT 130 MM HG: CPT | Performed by: NURSE PRACTITIONER

## 2022-11-14 PROCEDURE — 99214 OFFICE O/P EST MOD 30 MIN: CPT | Performed by: NURSE PRACTITIONER

## 2022-11-14 PROCEDURE — 3078F DIAST BP <80 MM HG: CPT | Performed by: NURSE PRACTITIONER

## 2022-11-14 PROCEDURE — 73070 X-RAY EXAM OF ELBOW: CPT

## 2022-11-14 RX ORDER — FLUOXETINE 10 MG/1
CAPSULE ORAL
Qty: 90 CAPSULE | Refills: 3 | Status: SHIPPED | OUTPATIENT
Start: 2022-11-14

## 2022-11-14 RX ORDER — ATORVASTATIN CALCIUM 40 MG/1
TABLET, FILM COATED ORAL
Qty: 90 TABLET | Refills: 3 | Status: SHIPPED | OUTPATIENT
Start: 2022-11-14

## 2022-11-14 RX ORDER — ERGOCALCIFEROL 1.25 MG/1
CAPSULE ORAL
Qty: 12 CAPSULE | Refills: 3 | Status: SHIPPED | OUTPATIENT
Start: 2022-11-14

## 2022-11-14 RX ORDER — DICLOFENAC EPOLAMINE 0.01 G/1
1 SYSTEM TOPICAL 2 TIMES DAILY
Qty: 60 PATCH | Refills: 1 | Status: SHIPPED | OUTPATIENT
Start: 2022-11-14

## 2022-11-14 RX ORDER — AMLODIPINE BESYLATE 5 MG/1
TABLET ORAL
Qty: 90 TABLET | Refills: 3 | Status: SHIPPED | OUTPATIENT
Start: 2022-11-14

## 2022-11-14 SDOH — ECONOMIC STABILITY: FOOD INSECURITY: WITHIN THE PAST 12 MONTHS, YOU WORRIED THAT YOUR FOOD WOULD RUN OUT BEFORE YOU GOT MONEY TO BUY MORE.: NEVER TRUE

## 2022-11-14 SDOH — ECONOMIC STABILITY: FOOD INSECURITY: WITHIN THE PAST 12 MONTHS, THE FOOD YOU BOUGHT JUST DIDN'T LAST AND YOU DIDN'T HAVE MONEY TO GET MORE.: NEVER TRUE

## 2022-11-14 ASSESSMENT — ENCOUNTER SYMPTOMS
RESPIRATORY NEGATIVE: 1
ABDOMINAL PAIN: 0
TROUBLE SWALLOWING: 0

## 2022-11-14 ASSESSMENT — PATIENT HEALTH QUESTIONNAIRE - PHQ9
SUM OF ALL RESPONSES TO PHQ QUESTIONS 1-9: 1
4. FEELING TIRED OR HAVING LITTLE ENERGY: 0
5. POOR APPETITE OR OVEREATING: 0
7. TROUBLE CONCENTRATING ON THINGS, SUCH AS READING THE NEWSPAPER OR WATCHING TELEVISION: 0
2. FEELING DOWN, DEPRESSED OR HOPELESS: 0
SUM OF ALL RESPONSES TO PHQ QUESTIONS 1-9: 1
SUM OF ALL RESPONSES TO PHQ9 QUESTIONS 1 & 2: 0
6. FEELING BAD ABOUT YOURSELF - OR THAT YOU ARE A FAILURE OR HAVE LET YOURSELF OR YOUR FAMILY DOWN: 0
9. THOUGHTS THAT YOU WOULD BE BETTER OFF DEAD, OR OF HURTING YOURSELF: 0
10. IF YOU CHECKED OFF ANY PROBLEMS, HOW DIFFICULT HAVE THESE PROBLEMS MADE IT FOR YOU TO DO YOUR WORK, TAKE CARE OF THINGS AT HOME, OR GET ALONG WITH OTHER PEOPLE: 0
1. LITTLE INTEREST OR PLEASURE IN DOING THINGS: 0
SUM OF ALL RESPONSES TO PHQ QUESTIONS 1-9: 1
3. TROUBLE FALLING OR STAYING ASLEEP: 1
SUM OF ALL RESPONSES TO PHQ QUESTIONS 1-9: 1
8. MOVING OR SPEAKING SO SLOWLY THAT OTHER PEOPLE COULD HAVE NOTICED. OR THE OPPOSITE, BEING SO FIGETY OR RESTLESS THAT YOU HAVE BEEN MOVING AROUND A LOT MORE THAN USUAL: 0

## 2022-11-14 ASSESSMENT — SOCIAL DETERMINANTS OF HEALTH (SDOH): HOW HARD IS IT FOR YOU TO PAY FOR THE VERY BASICS LIKE FOOD, HOUSING, MEDICAL CARE, AND HEATING?: NOT VERY HARD

## 2022-11-14 NOTE — LETTER
[facility]  [address] [facility] [address]  Phone: [phone]  Fax: [phone]    DENISHA Pope        November 14, 2022     Patient: Alberto Camejo   YOB: 1975   Date of Visit: 11/14/2022       To Whom it May Concern:    Anthony Virgilio was seen in my clinic on 11/14/2022. If you have any questions or concerns, please don't hesitate to call.     Sincerely,          DENISHA Pope

## 2022-11-14 NOTE — PROGRESS NOTES
SUBJECTIVE:    Patient ID: Atanacio Mortimer is a49 y.o. male. Atanacio Mortimer is here today for Check-Up (Yearly check up, needs a work excuse saying that he was here ) and Discuss Labs (Go over blood work )  . HPI:   HPI     Patient is here today to follow-up on routine health care. He does have past medical history of hypertension which is very well controlled here today. He has not reported any chest pains or shortness of breath. He also has hyperlipidemia and is adherent to atorvastatin 40 mg daily. He has recently lost 51 pounds since March of this year. This was an intentional weight loss that he has been successful with dietary changes. Cholesterol really did not change in that time. He has just had lab work so I do have up-to-date lab for review. 51 pounds down since March. Has reduced sugar  He also has hypothyroidism and is continuing to take 250 mcg daily and levels are very healthy. GERD has been an ongoing issue for him for years and he is currently taking esomeprazole 40 mg daily and continues to have successful treatment with this plan. Continues to see urology. This PSA 1.7. L shoulder pain continues. He is thinking spring surgery. He is using his right arm more, and it is his dominant arm, but he has noted right elbow pain. He notes having pain with pressure to the elbow and feels that something is mobile within the elbow. There is been no injury reported. He is agreeable to x-ray today. He also seems to have some tendinitis going on in the right elbow and we will go ahead and do an elbow clasp as well as Flector patches. Past Medical History:   Diagnosis Date    Adenoma of left adrenal gland     Hyperlipidemia     Hypertension     Hypothyroidism     Kidney stone     Multiple thyroid nodules 06/2020    Unspecified sleep apnea      Prior to Visit Medications    Medication Sig Taking?  Authorizing Provider   vitamin D (ERGOCALCIFEROL) 1.25 MG (65166 UT) CAPS capsule TAKE 1 CAPSULE ONCE A WEEK Yes DENISHA Ashton   FLUoxetine (PROZAC) 10 MG capsule TAKE 1 CAPSULE DAILY Yes DENISHA Ashton   amLODIPine (NORVASC) 5 MG tablet TAKE 1 TABLET DAILY Yes DENISHA Ashton   atorvastatin (LIPITOR) 40 MG tablet TAKE 1 TABLET NIGHTLY FOR CHOLESTEROL Yes DENISHA Ashton   diclofenac (FLECTOR) 1.3 % PTCH patch Place 1 patch onto the skin 2 times daily Yes DENISHA Ashton   Elastic Bandages & Supports (ELBOW SUPPORT) MISC Episport epicondylitis clasp for right elbow sized to fit Yes DENISHA Ashton   levothyroxine (SYNTHROID) 50 MCG tablet TAKE 1 TABLET po 30mins before breakfast DAILY Yes DENISHA Ashton   levothyroxine (SYNTHROID) 200 MCG tablet TAKE 1 TABLET po 30mins before breakfast Yes DENISHA Ashton   esomeprazole (NEXIUM) 40 MG delayed release capsule TAKE 1 CAPSULE po EVERY MORNING BEFORE BREAKFAST Yes DENISHA Ashton   buPROPion (WELLBUTRIN XL) 300 MG extended release tablet TAKE 1 TABLET po DAILY Yes DENISHA Ashton   olmesartan (BENICAR) 20 MG tablet TAKE 1 TABLET DAILY Yes DENISHA Ashton   PROAIR  (90 Base) MCG/ACT inhaler Inhale 2 puffs into the lungs every 6 hours as needed for Wheezing Yes DENISHA Ashton   albuterol sulfate HFA (VENTOLIN HFA) 108 (90 Base) MCG/ACT inhaler Inhale 2 puffs into the lungs every 6 hours as needed for Wheezing Yes DENISHA Ashton   tamsulosin (FLOMAX) 0.4 MG capsule Take 1 capsule by mouth daily  Zach King MD     No Known Allergies  Past Surgical History:   Procedure Laterality Date    APPENDECTOMY      CHOLECYSTECTOMY  2015    LITHOTRIPSY      SHOULDER DEBRIDEMENT Right 10/2013    WRIST SURGERY Left      Family History   Problem Relation Age of Onset    Asthma Mother     Diabetes Maternal Grandmother     Asthma Maternal Grandmother     Coronary Art Dis Father      Social History     Socioeconomic History    Marital status:      Spouse name: Not on file    Number of children: Not on file    Years of education: Not on file    Highest education level: Not on file   Occupational History    Not on file   Tobacco Use    Smoking status: Former     Types: Cigarettes     Quit date: 1999     Years since quittin.4    Smokeless tobacco: Never   Substance and Sexual Activity    Alcohol use: No    Drug use: No    Sexual activity: Not on file   Other Topics Concern    Not on file   Social History Narrative    Not on file     Social Determinants of Health     Financial Resource Strain: Low Risk     Difficulty of Paying Living Expenses: Not very hard   Food Insecurity: No Food Insecurity    Worried About Running Out of Food in the Last Year: Never true    Ran Out of Food in the Last Year: Never true   Transportation Needs: Not on file   Physical Activity: Not on file   Stress: Not on file   Social Connections: Not on file   Intimate Partner Violence: Not on file   Housing Stability: Not on file       Review of Systems   Constitutional:  Negative for unexpected weight change. HENT:  Negative for trouble swallowing. Respiratory: Negative. Cardiovascular: Negative. Gastrointestinal:  Negative for abdominal pain. Musculoskeletal:  Positive for arthralgias. OBJECTIVE:    Physical Exam  Vitals and nursing note reviewed. Constitutional:       General: He is not in acute distress. Appearance: Normal appearance. He is well-developed. He is not ill-appearing or toxic-appearing. HENT:      Head: Normocephalic and atraumatic. Right Ear: Tympanic membrane, ear canal and external ear normal. There is no impacted cerumen. Left Ear: Tympanic membrane, ear canal and external ear normal. There is no impacted cerumen. Nose: Nose normal. No congestion. Mouth/Throat:      Mouth: Mucous membranes are moist.      Pharynx: Oropharynx is clear. No oropharyngeal exudate. Eyes:      Extraocular Movements: Extraocular movements intact.       Conjunctiva/sclera: Conjunctivae normal.      Pupils: Pupils are equal, round, and reactive to light. Neck:      Trachea: No tracheal deviation. Cardiovascular:      Rate and Rhythm: Normal rate and regular rhythm. Heart sounds: Normal heart sounds. No murmur heard. Pulmonary:      Effort: Pulmonary effort is normal.      Breath sounds: Normal breath sounds. Abdominal:      General: Bowel sounds are normal. There is no distension. Palpations: Abdomen is soft. Musculoskeletal:      Cervical back: Normal range of motion and neck supple. No rigidity. Right lower leg: No edema. Left lower leg: No edema. Lymphadenopathy:      Cervical: No cervical adenopathy. Skin:     General: Skin is warm and dry. Capillary Refill: Capillary refill takes less than 2 seconds. Neurological:      General: No focal deficit present. Mental Status: He is alert and oriented to person, place, and time. Mental status is at baseline. Psychiatric:         Mood and Affect: Mood normal. Mood is not anxious or depressed. Affect is not angry. Speech: Speech normal.         Behavior: Behavior normal.         Thought Content: Thought content normal.         Judgment: Judgment normal.       /70 (Site: Left Upper Arm, Position: Sitting, Cuff Size: Large Adult)   Pulse 70   Temp 97.9 °F (36.6 °C) (Temporal)   Ht 6' 3\" (1.905 m)   Wt 258 lb 6 oz (117.2 kg)   SpO2 98%   BMI 32.29 kg/m²      ASSESSMENT:      ICD-10-CM    1. Right elbow pain  M25.521 diclofenac (FLECTOR) 1.3 % PTCH patch     Elastic Bandages & Supports (ELBOW SUPPORT) MISC     XR ELBOW RIGHT (2 VIEWS)      2. Essential hypertension  I10 amLODIPine (NORVASC) 5 MG tablet      3. Vitamin D deficiency  E55.9 vitamin D (ERGOCALCIFEROL) 1.25 MG (77384 UT) CAPS capsule      4. Anxiety  F41.9 TSH with Reflex to FT4     FLUoxetine (PROZAC) 10 MG capsule      5.  Mixed hyperlipidemia  E78.2 Comprehensive Metabolic Panel     Lipid Panel     atorvastatin (LIPITOR) 40 MG tablet PLAN:    Tosha Andre: Check-Up (Yearly check up, needs a work excuse saying that he was here ) and Discuss Labs (Go over blood work )  Lab due in 6mo-entered. Plan as above  Xray today  Diagnosis and orders for this visit are above. Please note that this chart was generated using dragon dictationsoftware. Although every effort was made to ensure the accuracy of this automated transcription, some errors in transcription may have occurred.

## 2022-11-18 ENCOUNTER — OFFICE VISIT (OUTPATIENT)
Dept: UROLOGY | Age: 47
End: 2022-11-18
Payer: COMMERCIAL

## 2022-11-18 VITALS — WEIGHT: 261 LBS | TEMPERATURE: 98.4 F | HEIGHT: 75 IN | BODY MASS INDEX: 32.45 KG/M2

## 2022-11-18 DIAGNOSIS — N40.1 BENIGN PROSTATIC HYPERPLASIA WITH WEAK URINARY STREAM: Primary | ICD-10-CM

## 2022-11-18 DIAGNOSIS — R39.12 BENIGN PROSTATIC HYPERPLASIA WITH WEAK URINARY STREAM: Primary | ICD-10-CM

## 2022-11-18 DIAGNOSIS — R97.20 INCREASED PROSTATE SPECIFIC ANTIGEN (PSA) VELOCITY: ICD-10-CM

## 2022-11-18 LAB
APPEARANCE FLUID: CLEAR
BILIRUBIN, POC: NORMAL
BLOOD URINE, POC: NORMAL
CLARITY, POC: CLEAR
COLOR, POC: YELLOW
GLUCOSE URINE, POC: NORMAL
KETONES, POC: NORMAL
LEUKOCYTE EST, POC: NORMAL
NITRITE, POC: NORMAL
PH, POC: 7
PROTEIN, POC: NORMAL
SPECIFIC GRAVITY, POC: 1.02
UROBILINOGEN, POC: 0.2

## 2022-11-18 PROCEDURE — 81002 URINALYSIS NONAUTO W/O SCOPE: CPT | Performed by: UROLOGY

## 2022-11-18 PROCEDURE — 99213 OFFICE O/P EST LOW 20 MIN: CPT | Performed by: UROLOGY

## 2022-11-18 RX ORDER — MELOXICAM 15 MG/1
TABLET ORAL
COMMUNITY
Start: 2022-11-15

## 2022-11-18 ASSESSMENT — ENCOUNTER SYMPTOMS
SORE THROAT: 0
BACK PAIN: 0
VOMITING: 0
CHEST TIGHTNESS: 0
NAUSEA: 0
EYE DISCHARGE: 0
WHEEZING: 0
EYE REDNESS: 0
FACIAL SWELLING: 0

## 2022-11-18 NOTE — PROGRESS NOTES
Miky Thomas is a 50 y.o. male who presents today   Chief Complaint   Patient presents with    Follow-up     I am here today for a 6 month fu. I had my psa done prior. Elevated PSA:  Patient is here today for history of an elevated PSA. He had an isolated elevated PSA in January 2022. Prior to that his PSA is within normal limits for his age and he has had 2 PSAs since that time has been normal.  He comes in for follow-up. He does not have a family history of prostate cancer but does have a family history of BPH  His last several PSA values are as follows:  Lab Results   Component Value Date    PSA 1.7 11/07/2022    PSA 2.3 04/29/2022    PSA 3.8 01/31/2022    PSA 1.5 10/29/2021    PSA 2.16 02/22/2021     Overall the PSA level is: decreased  Recent history of urinary tract infection/prostatitis? no  Previous prostate biopsy? no  Lower urinary tract symptoms: urgency, frequency, decreased urinary stream, and nocturia, 1 times per night. BPH / LUTS:  Patient is here today for lower urinary tract symptoms which started a few year(s) ago. Recently the urinary symptoms are: are improving  Current medical Rx for BPH/LUTS: Tamsulosin  Previous treatments tried for LUTS: Medical therapy with alpha-blocker  Previous surgical intervention: none  Urinary retention: No  Any associated gross hematuria? no  History of recurrent UTIs: no  His AUA Symptom Score is, 7/35, quality-of-life score is 1 pleased  Patient states symptoms are of mild severity.           Past Medical History:   Diagnosis Date    Adenoma of left adrenal gland     Hyperlipidemia     Hypertension     Hypothyroidism     Kidney stone     Multiple thyroid nodules 06/2020    Unspecified sleep apnea        Past Surgical History:   Procedure Laterality Date    APPENDECTOMY      CHOLECYSTECTOMY  2015    LITHOTRIPSY      SHOULDER DEBRIDEMENT Right 10/2013    WRIST SURGERY Left        Current Outpatient Medications   Medication Sig Dispense Refill meloxicam (MOBIC) 15 MG tablet       vitamin D (ERGOCALCIFEROL) 1.25 MG (74365 UT) CAPS capsule TAKE 1 CAPSULE ONCE A WEEK 12 capsule 3    FLUoxetine (PROZAC) 10 MG capsule TAKE 1 CAPSULE DAILY 90 capsule 3    amLODIPine (NORVASC) 5 MG tablet TAKE 1 TABLET DAILY 90 tablet 3    atorvastatin (LIPITOR) 40 MG tablet TAKE 1 TABLET NIGHTLY FOR CHOLESTEROL 90 tablet 3    diclofenac (FLECTOR) 1.3 % PTCH patch Place 1 patch onto the skin 2 times daily 60 patch 1    Elastic Bandages & Supports (ELBOW SUPPORT) MISC Episport epicondylitis clasp for right elbow sized to fit 1 each 0    levothyroxine (SYNTHROID) 50 MCG tablet TAKE 1 TABLET po 30mins before breakfast DAILY 90 tablet 3    levothyroxine (SYNTHROID) 200 MCG tablet TAKE 1 TABLET po 30mins before breakfast 90 tablet 3    esomeprazole (NEXIUM) 40 MG delayed release capsule TAKE 1 CAPSULE po EVERY MORNING BEFORE BREAKFAST 90 capsule 3    buPROPion (WELLBUTRIN XL) 300 MG extended release tablet TAKE 1 TABLET po DAILY 90 tablet 3    olmesartan (BENICAR) 20 MG tablet TAKE 1 TABLET DAILY 90 tablet 3    tamsulosin (FLOMAX) 0.4 MG capsule Take 1 capsule by mouth daily 90 capsule 3    PROAIR  (90 Base) MCG/ACT inhaler Inhale 2 puffs into the lungs every 6 hours as needed for Wheezing 1 Inhaler 3    albuterol sulfate HFA (VENTOLIN HFA) 108 (90 Base) MCG/ACT inhaler Inhale 2 puffs into the lungs every 6 hours as needed for Wheezing 1 Inhaler 5     No current facility-administered medications for this visit.        No Known Allergies    Social History     Socioeconomic History    Marital status:      Spouse name: None    Number of children: None    Years of education: None    Highest education level: None   Tobacco Use    Smoking status: Former     Types: Cigarettes     Quit date: 1999     Years since quittin.4    Smokeless tobacco: Never   Substance and Sexual Activity    Alcohol use: No    Drug use: No     Social Determinants of Health     Financial Resource Strain: Low Risk     Difficulty of Paying Living Expenses: Not very hard   Food Insecurity: No Food Insecurity    Worried About Running Out of Food in the Last Year: Never true    Ran Out of Food in the Last Year: Never true       Family History   Problem Relation Age of Onset    Asthma Mother     Diabetes Maternal Grandmother     Asthma Maternal Grandmother     Coronary Art Dis Father        REVIEW OF SYSTEMS:  Review of Systems   Constitutional:  Negative for chills and fever. HENT:  Negative for facial swelling and sore throat. Eyes:  Negative for discharge and redness. Respiratory:  Negative for chest tightness and wheezing. Cardiovascular:  Negative for chest pain and palpitations. Gastrointestinal:  Negative for nausea and vomiting. Endocrine: Negative for polyphagia and polyuria. Genitourinary:  Negative for decreased urine volume, difficulty urinating, dysuria, enuresis, flank pain, frequency, genital sores, hematuria, penile discharge, penile pain, penile swelling, scrotal swelling, testicular pain and urgency. Musculoskeletal:  Negative for back pain and neck stiffness. Skin:  Negative for rash and wound. Neurological:  Negative for dizziness and headaches. Hematological:  Negative for adenopathy. Does not bruise/bleed easily. Psychiatric/Behavioral:  Negative for confusion and hallucinations.         PHYSICAL EXAM:  Temp 98.4 °F (36.9 °C)   Ht 6' 3\" (1.905 m)   Wt 261 lb (118.4 kg)   BMI 32.62 kg/m²   Physical Exam        DATA:  CMP:    Lab Results   Component Value Date/Time     11/07/2022 07:34 AM    K 4.1 11/07/2022 07:34 AM     11/07/2022 07:34 AM    CO2 22 11/07/2022 07:34 AM    BUN 14 11/07/2022 07:34 AM    CREATININE 0.8 11/07/2022 07:34 AM    GFRAA >59 04/29/2022 02:31 PM    LABGLOM >60 11/07/2022 07:34 AM    GLUCOSE 94 11/07/2022 07:34 AM    PROT 6.6 11/07/2022 07:34 AM    LABALBU 4.5 11/07/2022 07:34 AM    CALCIUM 9.3 11/07/2022 07:34 AM BILITOT 0.5 11/07/2022 07:34 AM    ALKPHOS 78 11/07/2022 07:34 AM    AST 17 11/07/2022 07:34 AM    ALT 27 11/07/2022 07:34 AM     Results for orders placed or performed in visit on 11/18/22   POCT Urinalysis no Micro   Result Value Ref Range    Color, UA yellow     Clarity, UA clear     Glucose, UA POC neg     Bilirubin, UA neg     Ketones, UA neg     Spec Grav, UA 1.025     Blood, UA POC neg     pH, UA 7.0     Protein, UA POC neg     Urobilinogen, UA 0.2     Leukocytes, UA neg     Nitrite, UA neg     Appearance, Fluid Clear Clear, Slightly Cloudy     Lab Results   Component Value Date    PSA 1.7 11/07/2022    PSA 2.3 04/29/2022    PSA 3.8 01/31/2022    PSA 1.5 10/29/2021    PSA 2.16 02/22/2021     Lab Results   Component Value Date    PSAFREEPCT 18 11/07/2022    PSAFREEPCT 13 04/29/2022    PSAFREEPCT 13 01/31/2022     1. Benign prostatic hyperplasia with weak urinary stream   His symptoms are stable on tamsulosin. - PSA, Total and Free; Future    2. Increased prostate specific antigen (PSA) velocity  PSA is back down to normal I see no reason why this needs to be checked more frequently than once a year. He can follow-up for routine screening in 1 year. - POCT Urinalysis no Micro      Orders Placed This Encounter   Procedures    PSA, Total and Free     Prior to next visit in 1 year     Standing Status:   Future     Standing Expiration Date:   5/18/2024    POCT Urinalysis no Micro        Return in 1 year (on 11/18/2023) for PSA prior to vext visit. All information inputted into the note by the MA to include chief complaint, past medical history, past surgical history, medications, allergies, social and family history and review of systems has been reviewed and updated as needed by me. EMR Dragon/transcription disclaimer: Much of this documentt is electronic  transcription/translation of spoken language to printed text.  The  electronic translation of spoken language may be erroneous, or at times,  nonsensical words or phrases may be inadvertently transcribed.  Although I  have reviewed the document for such errors, some may still exist.

## 2022-12-05 ENCOUNTER — PATIENT MESSAGE (OUTPATIENT)
Dept: FAMILY MEDICINE CLINIC | Age: 47
End: 2022-12-05

## 2022-12-05 DIAGNOSIS — M77.8 ENTHESOPATHY OF ELBOW: ICD-10-CM

## 2022-12-05 DIAGNOSIS — M25.521 RIGHT ELBOW PAIN: Primary | ICD-10-CM

## 2022-12-05 NOTE — TELEPHONE ENCOUNTER
From: Miky Thomas  To: Sarah Zimmer  Sent: 12/5/2022 7:56 AM CST  Subject: Question regarding XR Elbow Right    [facility] [place] [facility]. Dr. Cristino Tyler is who Ive seen for my shoulders.      Thanks,  Nonda Code

## 2022-12-08 DIAGNOSIS — F34.1 DYSTHYMIA: ICD-10-CM

## 2022-12-08 DIAGNOSIS — E03.8 OTHER SPECIFIED HYPOTHYROIDISM: ICD-10-CM

## 2022-12-08 NOTE — TELEPHONE ENCOUNTER
Sulema Huberts called to request a refill on his medication.       Last office visit : 11/14/2022   Next office visit : Visit date not found     Requested Prescriptions     Pending Prescriptions Disp Refills    levothyroxine (SYNTHROID) 50 MCG tablet 90 tablet 3     Sig: TAKE 1 TABLET po 30mins before breakfast DAILY    levothyroxine (SYNTHROID) 200 MCG tablet 90 tablet 3     Sig: TAKE 1 TABLET po 30mins before breakfast    buPROPion (WELLBUTRIN XL) 300 MG extended release tablet 90 tablet 3     Sig: TAKE 1 TABLET po DAILY    esomeprazole (NEXIUM) 40 MG delayed release capsule 90 capsule 3     Sig: TAKE 1 CAPSULE po EVERY MORNING BEFORE BREAKFAST            Amarilys Collins, [facility]

## 2022-12-09 RX ORDER — LEVOTHYROXINE SODIUM 0.2 MG/1
TABLET ORAL
Qty: 90 TABLET | Refills: 3 | OUTPATIENT
Start: 2022-12-09

## 2022-12-09 RX ORDER — LEVOTHYROXINE SODIUM 0.05 MG/1
TABLET ORAL
Qty: 90 TABLET | Refills: 3 | OUTPATIENT
Start: 2022-12-09

## 2022-12-09 RX ORDER — ESOMEPRAZOLE MAGNESIUM 40 MG/1
CAPSULE, DELAYED RELEASE ORAL
Qty: 90 CAPSULE | Refills: 3 | OUTPATIENT
Start: 2022-12-09

## 2022-12-09 RX ORDER — BUPROPION HYDROCHLORIDE 300 MG/1
TABLET ORAL
Qty: 90 TABLET | Refills: 3 | OUTPATIENT
Start: 2022-12-09

## 2022-12-09 NOTE — TELEPHONE ENCOUNTER
Patient doesn't want these medications to be sent to [facility]. He wants them to stay at [facility] order.

## 2022-12-09 NOTE — TELEPHONE ENCOUNTER
It looks as though he should have these through Sept and Nov next year? Please let me know if I should be sending to a different pharmacy.

## 2022-12-12 NOTE — TELEPHONE ENCOUNTER
The medications that you sent to [facility] need to stay there and he has some that go to [facility] that he wants to stay there. He asked that nothing be sent in at this time.

## 2022-12-27 NOTE — DISCHARGE INSTRUCTIONS
UPPER EXTREMITY POST-OP INSTRUCTIONS - DR. Marcianne Aschoff    IMPORTANT PHONE NUMBERS:   For emergencies, please call [phone]   You may reach Dr. Raimundo Bello and clinical staff at [phone] [phone] 8:00 am-5:00 pm   After 5pm or on the weekends, please call [phone]   Call immediately if you have any of the following symptoms:     Elevated temperature above 101.5 degrees for more than 48 hours after surgery     Persistent drainage from wound     Severe pain around surgical site    Sling use: The sling is provided for your comfort and to ensure proper healing of your repair following surgery. Please place the abduction pillow with the curved side against your side and the sling on the side of the pillow. Your surgery requires that you wear the sling if noted below. ____ For comfort. Remove sling 24 hours and begin range of motion exercises  __x__ At all times except bathing, dressing, and therapy. Also wear the sling during sleep.  ____ No sling required    Bathing:  ___No bandages, no restrictions! !  _x__You may remove your dressing and shower on the 3rd day after surgery (Ex. Tues surgery, shower on Friday)  ** if you are told to it is ok to remove your dressing and shower, DO NOT SOAK your incisions in a tub.  ___Keep splint clean, dry, and intact. DO NOT place foreign objects into your splint. DRIVING:  absolutely no driving while taking pain medication. This will be discussed at your first postop visit. Dressings: Keep dressing/splint intact unless instructed otherwise below. SOME DRAINAGE IS NORMAL! DO NOT touch or apply ointment to the incision. DO NOT remove the steri-strips over the incisions (if you have steri-strips). They will         generally fall off on their own or can be removed 1 weeksafter surgery. If you have yellow gauze and it comes off, do not worry about it. Leave them off.     Signs of infection that warrant a phone call to our clinical line:     o Excessive drainage or redness     o Red streaking coming away from the incision  o Increased pain  o Increased temperature above 101.5 degrees      Physical Therapy:        *  Your physical therapy status will be discussed with you postoperatively and at your first post-op appointment. Some injuries will not require physical therapy. *  If you have a shoulder manipulation, please schedule therapy for the next day      Medications: You will be discharged with the appropriate medications following your surgery. Fill these at the pharmacy and take them as directed on the label. Not all of the medications below may be prescribed. Occasionally, other medications may be prescribed with specific instructions. Percocet/Norco (oxycodone/hydrocodone with tylenol) - Pain Medication, will cause drowsiness, possibly itchiness (this is NOT an allergy - use benadryl or an over the counter allergy medication such as Claritin or Zyrtec)     o Take 1-2 tablets every 4-6 hours. DO NOT EXCEED 4,000mg of Tylenol in 24 hours. **DO NOT MIX WITH ALCOHOL, DRIVE WHILE TAKING, OR TAKE with extra TYLENOL**     *  After the 2nd day of surgery, begin weaning pain medication (less pills, increased timeframe)     *  ALL narcotics will cause constipation - take a stool softener frequently. If you become constipated consider taking Milk of Magnesia as directed on the bottle. Colace (Docusate) - stool softener, used for constipation    Zofran (Ondansetron) or Phenergan - Anti-nausea medication, will cause drowsiness      **If you are running low on pain medications, please notify us if you need a refill 24-48 hours prior to when you run out, so we can make arrangements to refill the prescription for you if we determine it is necessary.

## 2022-12-28 ENCOUNTER — HOSPITAL ENCOUNTER (OUTPATIENT)
Dept: PREADMISSION TESTING | Age: 47
End: 2022-12-28
Payer: COMMERCIAL

## 2022-12-28 VITALS — WEIGHT: 258 LBS | BODY MASS INDEX: 32.25 KG/M2

## 2022-12-28 PROBLEM — S46.812D PARTIAL TEAR OF SUBSCAPULARIS TENDON, LEFT, SUBSEQUENT ENCOUNTER: Status: ACTIVE | Noted: 2022-12-28

## 2022-12-28 NOTE — DISCHARGE INSTRUCTIONS
The day before surgery you will receive a phone call from the surgery nurse to let you know what time to arrive on the day of surgery. This call will usually be between 2-4 PM. If you do not receive a phone call by 4 PM the day before your surgery please call [phone] and let them know you have not received an arrival time. If your surgery is on Monday, your call will be on the Friday before your Monday surgery. The morning of surgery, you may take all your prescribed medications with a sip of water. Any exceptions to this would be listed below:    Do not take Olmesartan the morning of surgery    PREOPERATIVE GUIDELINES WHEN RECEIVING ANESTHESIA    Do not eat or drink anything after midnight, the night before your surgery. This is extremely important for your safety. Take a bath (or shower) the night before your surgery and you may brush your teeth the morning of your surgery. You will be scheduled to arrive at the hospital 2 hours before your surgery, or follow your surgeon's instructions. Dress comfortably. Wear loose clothing that will be easy to remove and comfortable for your trip home. You may wear eyeglasses or contacts but bring your cases with you as they must be remove before your surgery. Hearing aids and dentures will need to be removed before your surgery. Do not wear any jewelry, including body jewelry. All jewelry will need to be removed prior to your surgery. Do not wear fingernail polish or make-up. It is best not to bring any valuables with you. If you are to stay in the hospital overnight, bring your robe, slippers and personal toiletries that you may need. POSTOPERATIVE GUIDELINES AFTER RECEIVING ANESTHESIA    If you are to go home after your surgery, you will need a responsible adult to drive you home.      You will not be able to take public transportation after your discharge from the Operative Care Unit unless you are accompanied by a responsible adult. On returning home, be sure to follow your physician's orders regarding diet, activity and medications. Remember, surgery with general anesthesia or sedation may leave you sleepy, very tired and with a decreased appetite for 12 to 24 hours. If you develop any post-surgical complications or problems, call your surgeon or [facility] ([phone]). [address] [place] [address] for Surgery Patients-Revised 6-    Visitors for surgery patients are essential for the patient's emotional well-being and care       post operatively. 2.   Visitor Expectations and Limitations          3. One visitor allowed with patients in the preop/postop rooms. 4.  A second visitor may sit in the waiting area. 5.  No children under 13 allowed in the pre-post op areas unless they are the patient. 6.  Two people may be with an underage surgical/procedural patient in preop/postop        room. 7.  If you are admitted to the hospital post operatively, there are NO RESTRICTIONS on       the floor at this time. 8.  If you are admitted to ICU postoperatively, you may have one visitor in the room from        7A-7P. A second visitor may sit in the ICU waiting room. No overnight visitors in         ICU waiting room.

## 2022-12-28 NOTE — OP NOTE
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 male presented to my clinic with complaints of progressive shoulder pain after a lifting his wife's wheelchair onto a curb in 2021. I saw him a year ago and reviewed his MRI which showed a partial subscapularis tendon tear associated with medial subluxation of the biceps tendon. Pain wrosened over time and was not relieved with conservative treatment consisting of anti-inflammatories, corticosteroid injections, physical therapy. Due to progressive pain and loss of function, surgical evaluation was discussed and the patient wished to proceed understanding risks, benefits, and alternatives. The surgical indication were to relieve pain, improve function, and prevent future disability in regards to the shoulder pathology dictated in the aboved diagnoses. Risks included, but were not limited to, that of anesthesia, bleeding, infection, pain, damage to local structures, need for further surgery, failure of repair, stiffness, failure of implants, and loss of function.         OPERATIVE FINDINGS:  1) Exam under anesthesia:  Full passive ROM, joint stable without laxity, skin intact  2) Glenohumeral Joint:       A) Chondral surfaces: Intact         B) Labrum: Intact without tear             C) Biceps: Hypertrophy with synovitis, medial dislocation, Partial tear         D) Rotator Cuff: Intact without tear  3) Subacromial space:         A) Minimal bursitis         B) Type 1 acromium, hypertrophic coracoacromial ligament         C) Bursal surface rotator cuff: Intact without tear           D) AC joint:  Intact without arthritic change       PROCEDURE in DETAIL:  The patient was seen in the preoperative holding room, once again the informed consent was reviewed with the patient and signed. The site of surgery was marked with the patient's agreement. After being transported to the operating room, a timeout was performed identifying the correct patient as well as the operative site. Dose appropriate IV antibiotics were given prior to incision. The patient was positioned in the beach chair position, all bony prominences were protected and a sterile prep and drape was performed. A standard posterior arthroscopy portal was established and an outside-in technique was utilized to establish an anterior portal within the rotator interval.  An arthroscopic probe was inserted and a thorough exam of the glenohumeral joint was performed. Attention was first turned to the biceps-labral anchor and the probe was used to identify synovitis and tearing of the biceps tendon as a ramp test was performed. There was a superior border subscapularis tendon tear that was debrided with a shaver to a stable surface. A biceps tenotomy was then performed at the biceps-labral anchor with a cautery device. The arthroscope was then transitioned to the subacromial space and an outside in technique was used to establish a lateral portal.  The arthroscopic shaver was introduced in the subacromial space and a complete bursectomy was performed with this device.   The underlying rotator cuff was inspected and found intact as was the intraarticular surface of the superior cuff. Free fluid was suctioned from the shoulder followed by slightly reclining the patient in the beach chair position. The shoulder was re-prepped with betadine. A 3 cm superior axillary incision was created, soft tissue was dissected subpectorally to the medial and lateral aspects of the humeral shaft. The previously tenotomized biceps tendon was located, pulled into the incision site, whipstitched 1 cm from the musculotendinous junction, and an 8 mm drill hole was placed unicortically directed into the biceps groove. The tendon was then inserted into this hole and secured with an 8x12 mm Arthrex biotenodesis screw. The incision was irrigated follow by closure with 2-0 vicryl suture and adhesive glue. Portals were then closed with monocryl and a sterile dressing was applied followed by a sling. The patient was awakened from anesthesia, transported to the recovery room in stable condition.     POSTOP PLAN:    1) Discharge home with family  2) Follow up in 1 week for a clinical check  3) Follow the following protocol: Biceps tenodesis       Electronically signed by Marcia Torre MD on 12/29/2022 at 4:57 PM

## 2022-12-29 ENCOUNTER — HOSPITAL ENCOUNTER (OUTPATIENT)
Age: 47
Setting detail: OUTPATIENT SURGERY
Discharge: HOME | End: 2022-12-29
Attending: ORTHOPAEDIC SURGERY | Admitting: ORTHOPAEDIC SURGERY
Payer: COMMERCIAL

## 2022-12-29 ENCOUNTER — ANESTHESIA (OUTPATIENT)
Dept: OPERATING ROOM | Age: 47
End: 2022-12-29
Payer: COMMERCIAL

## 2022-12-29 ENCOUNTER — ANESTHESIA EVENT (OUTPATIENT)
Dept: OPERATING ROOM | Age: 47
End: 2022-12-29
Payer: COMMERCIAL

## 2022-12-29 VITALS
HEIGHT: 75 IN | BODY MASS INDEX: 32.08 KG/M2 | WEIGHT: 258 LBS | OXYGEN SATURATION: 92 % | RESPIRATION RATE: 16 BRPM | SYSTOLIC BLOOD PRESSURE: 128 MMHG | HEART RATE: 87 BPM | TEMPERATURE: 99 F | DIASTOLIC BLOOD PRESSURE: 74 MMHG

## 2022-12-29 DIAGNOSIS — S46.812D PARTIAL TEAR OF SUBSCAPULARIS TENDON, LEFT, SUBSEQUENT ENCOUNTER: Primary | ICD-10-CM

## 2022-12-29 PROCEDURE — 76942 ECHO GUIDE FOR BIOPSY: CPT

## 2022-12-29 PROCEDURE — 2580000003 HC RX 258: Performed by: ANESTHESIOLOGY

## 2022-12-29 PROCEDURE — 2500000003 HC RX 250 WO HCPCS: Performed by: ANESTHESIOLOGY

## 2022-12-29 PROCEDURE — 6360000002 HC RX W HCPCS: Performed by: ANESTHESIOLOGY

## 2022-12-29 PROCEDURE — 7100000001 HC PACU RECOVERY - ADDTL 15 MIN: Performed by: ORTHOPAEDIC SURGERY

## 2022-12-29 PROCEDURE — 6360000002 HC RX W HCPCS

## 2022-12-29 PROCEDURE — 3700000001 HC ADD 15 MINUTES (ANESTHESIA): Performed by: ORTHOPAEDIC SURGERY

## 2022-12-29 PROCEDURE — C1713 ANCHOR/SCREW BN/BN,TIS/BN: HCPCS | Performed by: ORTHOPAEDIC SURGERY

## 2022-12-29 PROCEDURE — 2500000003 HC RX 250 WO HCPCS: Performed by: ORTHOPAEDIC SURGERY

## 2022-12-29 PROCEDURE — A4216 STERILE WATER/SALINE, 10 ML: HCPCS | Performed by: ANESTHESIOLOGY

## 2022-12-29 PROCEDURE — 3700000000 HC ANESTHESIA ATTENDED CARE: Performed by: ORTHOPAEDIC SURGERY

## 2022-12-29 PROCEDURE — 6360000002 HC RX W HCPCS: Performed by: ORTHOPAEDIC SURGERY

## 2022-12-29 PROCEDURE — 3600000014 HC SURGERY LEVEL 4 ADDTL 15MIN: Performed by: ORTHOPAEDIC SURGERY

## 2022-12-29 PROCEDURE — 2709999900 HC NON-CHARGEABLE SUPPLY: Performed by: ORTHOPAEDIC SURGERY

## 2022-12-29 PROCEDURE — 2500000003 HC RX 250 WO HCPCS

## 2022-12-29 PROCEDURE — 3600000004 HC SURGERY LEVEL 4 BASE: Performed by: ORTHOPAEDIC SURGERY

## 2022-12-29 PROCEDURE — 2580000003 HC RX 258: Performed by: ORTHOPAEDIC SURGERY

## 2022-12-29 PROCEDURE — 2720000010 HC SURG SUPPLY STERILE: Performed by: ORTHOPAEDIC SURGERY

## 2022-12-29 PROCEDURE — 7100000011 HC PHASE II RECOVERY - ADDTL 15 MIN: Performed by: ORTHOPAEDIC SURGERY

## 2022-12-29 PROCEDURE — 7100000000 HC PACU RECOVERY - FIRST 15 MIN: Performed by: ORTHOPAEDIC SURGERY

## 2022-12-29 PROCEDURE — 7100000010 HC PHASE II RECOVERY - FIRST 15 MIN: Performed by: ORTHOPAEDIC SURGERY

## 2022-12-29 PROCEDURE — A4217 STERILE WATER/SALINE, 500 ML: HCPCS | Performed by: ORTHOPAEDIC SURGERY

## 2022-12-29 DEVICE — SCREW INTFR L12MM DIA8MM BIOCOMPOSITE FACILITATE IORT TISS: Type: IMPLANTABLE DEVICE | Site: SHOULDER | Status: FUNCTIONAL

## 2022-12-29 RX ORDER — ONDANSETRON 2 MG/ML
INJECTION INTRAMUSCULAR; INTRAVENOUS PRN
Status: DISCONTINUED | OUTPATIENT
Start: 2022-12-29 | End: 2022-12-29 | Stop reason: SDUPTHER

## 2022-12-29 RX ORDER — LIDOCAINE HYDROCHLORIDE 10 MG/ML
1 INJECTION, SOLUTION EPIDURAL; INFILTRATION; INTRACAUDAL; PERINEURAL
Status: DISCONTINUED | OUTPATIENT
Start: 2022-12-29 | End: 2022-12-29 | Stop reason: HOSPADM

## 2022-12-29 RX ORDER — ONDANSETRON 4 MG/1
4 TABLET, FILM COATED ORAL EVERY 8 HOURS PRN
Qty: 10 TABLET | Refills: 0 | Status: SHIPPED | OUTPATIENT
Start: 2022-12-29

## 2022-12-29 RX ORDER — ROPIVACAINE HYDROCHLORIDE 5 MG/ML
INJECTION, SOLUTION EPIDURAL; INFILTRATION; PERINEURAL
Status: COMPLETED
Start: 2022-12-29 | End: 2022-12-29

## 2022-12-29 RX ORDER — BUPIVACAINE HYDROCHLORIDE AND EPINEPHRINE 2.5; 5 MG/ML; UG/ML
INJECTION, SOLUTION EPIDURAL; INFILTRATION; INTRACAUDAL; PERINEURAL PRN
Status: DISCONTINUED | OUTPATIENT
Start: 2022-12-29 | End: 2022-12-29 | Stop reason: ALTCHOICE

## 2022-12-29 RX ORDER — FENTANYL CITRATE 50 UG/ML
INJECTION, SOLUTION INTRAMUSCULAR; INTRAVENOUS PRN
Status: DISCONTINUED | OUTPATIENT
Start: 2022-12-29 | End: 2022-12-29 | Stop reason: SDUPTHER

## 2022-12-29 RX ORDER — SODIUM CHLORIDE, SODIUM LACTATE, POTASSIUM CHLORIDE, CALCIUM CHLORIDE 600; 310; 30; 20 MG/100ML; MG/100ML; MG/100ML; MG/100ML
INJECTION, SOLUTION INTRAVENOUS CONTINUOUS
Status: DISCONTINUED | OUTPATIENT
Start: 2022-12-29 | End: 2022-12-29 | Stop reason: HOSPADM

## 2022-12-29 RX ORDER — LIDOCAINE HYDROCHLORIDE 10 MG/ML
INJECTION, SOLUTION EPIDURAL; INFILTRATION; INTRACAUDAL; PERINEURAL PRN
Status: DISCONTINUED | OUTPATIENT
Start: 2022-12-29 | End: 2022-12-29 | Stop reason: SDUPTHER

## 2022-12-29 RX ORDER — SODIUM CHLORIDE 0.9 % (FLUSH) 0.9 %
5-40 SYRINGE (ML) INJECTION EVERY 12 HOURS SCHEDULED
Status: DISCONTINUED | OUTPATIENT
Start: 2022-12-29 | End: 2022-12-29 | Stop reason: HOSPADM

## 2022-12-29 RX ORDER — ROPIVACAINE HYDROCHLORIDE 5 MG/ML
INJECTION, SOLUTION EPIDURAL; INFILTRATION; PERINEURAL
Status: COMPLETED | OUTPATIENT
Start: 2022-12-29 | End: 2022-12-29

## 2022-12-29 RX ORDER — MIDAZOLAM HYDROCHLORIDE 2 MG/2ML
2 INJECTION, SOLUTION INTRAMUSCULAR; INTRAVENOUS
Status: COMPLETED | OUTPATIENT
Start: 2022-12-29 | End: 2022-12-29

## 2022-12-29 RX ORDER — FENTANYL CITRATE 50 UG/ML
50 INJECTION, SOLUTION INTRAMUSCULAR; INTRAVENOUS EVERY 5 MIN PRN
Status: DISCONTINUED | OUTPATIENT
Start: 2022-12-29 | End: 2022-12-29 | Stop reason: HOSPADM

## 2022-12-29 RX ORDER — FENTANYL CITRATE 50 UG/ML
50 INJECTION, SOLUTION INTRAMUSCULAR; INTRAVENOUS
Status: DISCONTINUED | OUTPATIENT
Start: 2022-12-29 | End: 2022-12-29 | Stop reason: HOSPADM

## 2022-12-29 RX ORDER — OXYCODONE AND ACETAMINOPHEN 10; 325 MG/1; MG/1
1 TABLET ORAL EVERY 6 HOURS PRN
Qty: 20 TABLET | Refills: 0 | Status: SHIPPED | OUTPATIENT
Start: 2022-12-29 | End: 2023-01-03

## 2022-12-29 RX ORDER — SODIUM CHLORIDE 9 MG/ML
INJECTION, SOLUTION INTRAVENOUS PRN
Status: DISCONTINUED | OUTPATIENT
Start: 2022-12-29 | End: 2022-12-29 | Stop reason: HOSPADM

## 2022-12-29 RX ORDER — SODIUM CHLORIDE 0.9 % (FLUSH) 0.9 %
5-40 SYRINGE (ML) INJECTION PRN
Status: DISCONTINUED | OUTPATIENT
Start: 2022-12-29 | End: 2022-12-29 | Stop reason: HOSPADM

## 2022-12-29 RX ORDER — PROCHLORPERAZINE EDISYLATE 5 MG/ML
5 INJECTION INTRAMUSCULAR; INTRAVENOUS
Status: DISCONTINUED | OUTPATIENT
Start: 2022-12-29 | End: 2022-12-29 | Stop reason: HOSPADM

## 2022-12-29 RX ORDER — DEXAMETHASONE SODIUM PHOSPHATE 10 MG/ML
INJECTION, SOLUTION INTRAMUSCULAR; INTRAVENOUS PRN
Status: DISCONTINUED | OUTPATIENT
Start: 2022-12-29 | End: 2022-12-29 | Stop reason: SDUPTHER

## 2022-12-29 RX ORDER — ONDANSETRON 2 MG/ML
4 INJECTION INTRAMUSCULAR; INTRAVENOUS
Status: DISCONTINUED | OUTPATIENT
Start: 2022-12-29 | End: 2022-12-29 | Stop reason: HOSPADM

## 2022-12-29 RX ORDER — DEXAMETHASONE SODIUM PHOSPHATE 4 MG/ML
4 INJECTION, SOLUTION INTRA-ARTICULAR; INTRALESIONAL; INTRAMUSCULAR; INTRAVENOUS; SOFT TISSUE ONCE
Status: COMPLETED | OUTPATIENT
Start: 2022-12-29 | End: 2022-12-29

## 2022-12-29 RX ORDER — ROCURONIUM BROMIDE 10 MG/ML
INJECTION, SOLUTION INTRAVENOUS PRN
Status: DISCONTINUED | OUTPATIENT
Start: 2022-12-29 | End: 2022-12-29 | Stop reason: SDUPTHER

## 2022-12-29 RX ORDER — FENTANYL CITRATE 50 UG/ML
25 INJECTION, SOLUTION INTRAMUSCULAR; INTRAVENOUS EVERY 5 MIN PRN
Status: DISCONTINUED | OUTPATIENT
Start: 2022-12-29 | End: 2022-12-29 | Stop reason: HOSPADM

## 2022-12-29 RX ORDER — MEPERIDINE HYDROCHLORIDE 25 MG/ML
12.5 INJECTION INTRAMUSCULAR; INTRAVENOUS; SUBCUTANEOUS EVERY 5 MIN PRN
Status: DISCONTINUED | OUTPATIENT
Start: 2022-12-29 | End: 2022-12-29 | Stop reason: HOSPADM

## 2022-12-29 RX ORDER — PROPOFOL 10 MG/ML
INJECTION, EMULSION INTRAVENOUS PRN
Status: DISCONTINUED | OUTPATIENT
Start: 2022-12-29 | End: 2022-12-29 | Stop reason: SDUPTHER

## 2022-12-29 RX ORDER — FENTANYL CITRATE 50 UG/ML
25 INJECTION, SOLUTION INTRAMUSCULAR; INTRAVENOUS
Status: DISCONTINUED | OUTPATIENT
Start: 2022-12-29 | End: 2022-12-29 | Stop reason: HOSPADM

## 2022-12-29 RX ORDER — DIPHENHYDRAMINE HYDROCHLORIDE 50 MG/ML
12.5 INJECTION INTRAMUSCULAR; INTRAVENOUS
Status: DISCONTINUED | OUTPATIENT
Start: 2022-12-29 | End: 2022-12-29 | Stop reason: HOSPADM

## 2022-12-29 RX ADMIN — CEFAZOLIN 2000 MG: 2 INJECTION, POWDER, FOR SOLUTION INTRAMUSCULAR; INTRAVENOUS at 16:17

## 2022-12-29 RX ADMIN — MIDAZOLAM HYDROCHLORIDE 2 MG: 1 INJECTION, SOLUTION INTRAMUSCULAR; INTRAVENOUS at 15:28

## 2022-12-29 RX ADMIN — ROCURONIUM BROMIDE 50 MG: 10 INJECTION, SOLUTION INTRAVENOUS at 16:02

## 2022-12-29 RX ADMIN — SUGAMMADEX 100 MG: 100 INJECTION, SOLUTION INTRAVENOUS at 16:51

## 2022-12-29 RX ADMIN — DEXAMETHASONE SODIUM PHOSPHATE 10 MG: 10 INJECTION, SOLUTION INTRAMUSCULAR; INTRAVENOUS at 16:02

## 2022-12-29 RX ADMIN — LIDOCAINE HYDROCHLORIDE 5 ML: 10 INJECTION, SOLUTION EPIDURAL; INFILTRATION; INTRACAUDAL; PERINEURAL at 16:02

## 2022-12-29 RX ADMIN — PROPOFOL 200 MG: 10 INJECTION, EMULSION INTRAVENOUS at 16:02

## 2022-12-29 RX ADMIN — DEXAMETHASONE SODIUM PHOSPHATE 4 MG: 4 INJECTION, SOLUTION INTRAMUSCULAR; INTRAVENOUS at 13:03

## 2022-12-29 RX ADMIN — SODIUM CHLORIDE, SODIUM LACTATE, POTASSIUM CHLORIDE, AND CALCIUM CHLORIDE: 600; 310; 30; 20 INJECTION, SOLUTION INTRAVENOUS at 12:41

## 2022-12-29 RX ADMIN — SODIUM CHLORIDE, SODIUM LACTATE, POTASSIUM CHLORIDE, AND CALCIUM CHLORIDE: 600; 310; 30; 20 INJECTION, SOLUTION INTRAVENOUS at 16:29

## 2022-12-29 RX ADMIN — ROPIVACAINE HYDROCHLORIDE 20 ML: 5 INJECTION, SOLUTION EPIDURAL; INFILTRATION; PERINEURAL at 15:34

## 2022-12-29 RX ADMIN — SUGAMMADEX 100 MG: 100 INJECTION, SOLUTION INTRAVENOUS at 16:50

## 2022-12-29 RX ADMIN — FENTANYL CITRATE 100 MCG: 50 INJECTION, SOLUTION INTRAMUSCULAR; INTRAVENOUS at 16:02

## 2022-12-29 RX ADMIN — FAMOTIDINE 20 MG: 10 INJECTION, SOLUTION INTRAVENOUS at 13:03

## 2022-12-29 RX ADMIN — ONDANSETRON 4 MG: 2 INJECTION INTRAMUSCULAR; INTRAVENOUS at 16:50

## 2022-12-29 ASSESSMENT — PAIN SCALES - GENERAL
PAINLEVEL_OUTOF10: 3
PAINLEVEL_OUTOF10: 3

## 2022-12-29 ASSESSMENT — PAIN DESCRIPTION - ORIENTATION
ORIENTATION: LEFT
ORIENTATION: LEFT

## 2022-12-29 ASSESSMENT — PAIN DESCRIPTION - PAIN TYPE
TYPE: SURGICAL PAIN
TYPE: SURGICAL PAIN

## 2022-12-29 ASSESSMENT — PAIN DESCRIPTION - LOCATION
LOCATION: SHOULDER
LOCATION: SHOULDER

## 2022-12-29 ASSESSMENT — PAIN DESCRIPTION - DESCRIPTORS
DESCRIPTORS: ACHING;BURNING
DESCRIPTORS: ACHING;BURNING

## 2022-12-29 ASSESSMENT — PAIN - FUNCTIONAL ASSESSMENT: PAIN_FUNCTIONAL_ASSESSMENT: 0-10

## 2022-12-29 ASSESSMENT — ENCOUNTER SYMPTOMS: SHORTNESS OF BREATH: 0

## 2022-12-29 NOTE — H&P
Pt Name: Tobin Carbone  MRN: [id number]  YOB: 1975  Date of evaluation: 12/29/2022    H&P including current review of systems was updated in the paper chart and/or the document previously scanned into the record.  There have been no significant changes or new problems since the original evaluation.  The patient's problems continue and indications for contemplated procedure have not changed.    Electronically signed by Zeferino Lemos MD on 12/29/2022 at 12:46 PM

## 2022-12-29 NOTE — ANESTHESIA PROCEDURE NOTES
Peripheral Block    Patient location during procedure: holding area  Reason for block: post-op pain management  Start time: 12/29/2022 3:34 PM  End time: 12/29/2022 3:34 PM  Staffing  Performed: anesthesiologist   Anesthesiologist: Talat Ward DO  Preanesthetic Checklist  Completed: patient identified, IV checked, site marked, risks and benefits discussed, surgical/procedural consents, equipment checked, pre-op evaluation, timeout performed, anesthesia consent given, oxygen available and monitors applied/VS acknowledged  Peripheral Block   Patient position: supine  Prep: ChloraPrep  Provider prep: sterile gloves and mask  Patient monitoring: cardiac monitor, continuous pulse ox, frequent blood pressure checks and IV access  Block type: Brachial plexus  Interscalene  Laterality: left  Injection technique: single-shot  Guidance: ultrasound guided  Local infiltration: ropivacaine  Local infiltration: ropivacaine    Needle   Needle type: combined needle/nerve stimulator   Needle gauge: 22 G  Needle localization: ultrasound guidance  Test dose: negative  Needle length: 5 cm  Assessment   Injection assessment: negative aspiration for heme, no paresthesia on injection and local visualized surrounding nerve on ultrasound  Paresthesia pain: none  Slow fractionated injection: yes  Hemodynamics: stable  Real-time US image taken/store: yes  Outcomes: uncomplicated and patient tolerated procedure well    Additional Notes  Needle was introduced aprroximately the C5-C6 region and using a inplane imaging technique the needle was directed thru the middle scalene muscle and brought to bear adjacent to the brachial plexus. Needle advancement was under direct vision at all times . Local Anesthesia was injected and all vascular structures were avoided. The local anesthetic hydrodissected in a perineural fashion. Ropivicaine 0.5% injected in divided doses, total of 20 cc injected.  The plexus appeared anatomically normal and no obvious pathology was noted.    Medications Administered  ropivacaine (NAROPIN) injection 0.5% - Perineural   20 mL - 12/29/2022 3:34:00 PM

## 2022-12-29 NOTE — ANESTHESIA POSTPROCEDURE EVALUATION
Department of Anesthesiology  Postprocedure Note    Patient: Eriberto Lee  MRN: [id number]  [place]: 1975  Date of evaluation: 12/29/2022      Procedure Summary     Date: 12/29/22 Room / Location: [address]    Anesthesia Start: 7814 Anesthesia Stop:     Procedure: LEFT SHOULDER SUBSCAPULARIS DEBRIDEMENT, BICEPS TENODESIS/TENOTOMY (Left: Shoulder) Diagnosis:       Partial tear of subscapularis tendon, left, subsequent encounter      Biceps tendonosis of left shoulder      (Partial tear of subscapularis tendon, left, subsequent encounter [S46.811D])      (Biceps tendonosis of left shoulder [Z60.849])    Surgeons: Laverne Dowd MD Responsible Provider: DENISHA Smyth CRNA    Anesthesia Type: general, regional ASA Status: 2          Anesthesia Type: No value filed.     Yonis Phase I: Yonis Score: 5    Yonis Phase II:        Anesthesia Post Evaluation    Patient location during evaluation: PACU  Patient participation: complete - patient participated  Level of consciousness: sleepy but conscious  Pain score: 0  Airway patency: patent  Nausea & Vomiting: no nausea and no vomiting  Complications: no  Cardiovascular status: blood pressure returned to baseline  Respiratory status: acceptable and nasal cannula  Hydration status: euvolemic  Comments: BP (!) 148/73   Pulse 99   Temp 97.4 °F (36.3 °C) (Temporal)   Resp 16   Ht 6' 3\" (1.905 m)   Wt 258 lb (117 kg)   SpO2 92%   BMI 32.25 kg/m²

## 2022-12-29 NOTE — ANESTHESIA PRE PROCEDURE
Department of Anesthesiology  Preprocedure Note       Name:  Kenzie Braden   Age:  50 y.o.  :  1975                                          MRN:  [id number]         Date:  2022      Surgeon: Ced Bailey):  Canelo Mejía MD    Procedure: Procedure(s):  LEFT SHOULDER SUBSCAPULARIS DEBRIDEMENT, BICEPS TENODESIS/TENOTOMY    Medications prior to admission:   Prior to Admission medications    Medication Sig Start Date End Date Taking?  Authorizing Provider   vitamin D (ERGOCALCIFEROL) 1.25 MG (89469 UT) CAPS capsule TAKE 1 CAPSULE ONCE A WEEK  Patient taking differently: Take 50,000 Units by mouth once a week TAKE 1 CAPSULE ONCE A WEEK 22   DENISHA Ashton   FLUoxetine (PROZAC) 10 MG capsule TAKE 1 CAPSULE DAILY  Patient taking differently: Take 10 mg by mouth daily TAKE 1 CAPSULE DAILY 22   DENISHA Ashton   amLODIPine (NORVASC) 5 MG tablet TAKE 1 TABLET DAILY  Patient taking differently: Take 5 mg by mouth daily TAKE 1 TABLET DAILY 22   DENISHA Ashton   atorvastatin (LIPITOR) 40 MG tablet TAKE 1 TABLET NIGHTLY FOR CHOLESTEROL  Patient taking differently: Take 40 mg by mouth daily TAKE 1 TABLET NIGHTLY FOR CHOLESTEROL 22   DENISHA Ashton   levothyroxine (SYNTHROID) 50 MCG tablet TAKE 1 TABLET po 30mins before breakfast DAILY 22   DENISHA Ashton   levothyroxine (SYNTHROID) 200 MCG tablet TAKE 1 TABLET po 30mins before breakfast  Patient taking differently: Take 250 mcg by mouth Daily TAKE 1 TABLET po 30mins before breakfast 22   DENISHA Ashton   esomeprazole (NEXIUM) 40 MG delayed release capsule TAKE 1 CAPSULE po [place] 42  Patient taking differently: Take 40 mg by mouth every morning (before breakfast) TAKE 1 CAPSULE po EVERY MORNING BEFORE BREAKFAST 22   DENISHA Ashton   buPROPion (WELLBUTRIN XL) 300 MG extended release tablet TAKE 1 TABLET po DAILY  Patient taking differently: Take 300 mg by mouth every morning TAKE 1 TABLET po DAILY 22   DENISHA Ashton   olmesartan (BENICAR) 20 MG tablet TAKE 1 TABLET DAILY  Patient taking differently: Take 20 mg by mouth daily TAKE 1 TABLET DAILY 21   DENISHA Ashton   tamsulosin (FLOMAX) 0.4 MG capsule Take 1 capsule by mouth daily 21  Kris Harper MD       Current medications:    Current Facility-Administered Medications   Medication Dose Route Frequency Provider Last Rate Last Admin    ceFAZolin (ANCEF) 2,000 mg in sterile water 20 mL IV syringe  2,000 mg IntraVENous Once Gopi Clifford MD        lactated ringers infusion   IntraVENous Continuous Cady Kitchen,  mL/hr at 22 1241 New Bag at 22 1241       Allergies:  No Known Allergies    Problem List:    Patient Active Problem List   Diagnosis Code    Mixed hyperlipidemia E78.2    Acquired hypothyroidism E03.9    Sleep apnea G47.30    Essential hypertension I10    Benign prostatic hyperplasia with weak urinary stream N40.1, R39.12    Partial tear of subscapularis tendon, left, subsequent encounter S46.513V       Past Medical History:        Diagnosis Date    Adenoma of left adrenal gland     Hyperlipidemia     Hypertension     Hypothyroidism     Kidney stone     Multiple thyroid nodules 2020    Unspecified sleep apnea        Past Surgical History:        Procedure Laterality Date    APPENDECTOMY      CHOLECYSTECTOMY  2015    LITHOTRIPSY      SHOULDER DEBRIDEMENT Right 10/2013    WRIST SURGERY Left        Social History:    Social History     Tobacco Use    Smoking status: Former     Types: Cigarettes     Quit date: 1999     Years since quittin.5    Smokeless tobacco: Never   Substance Use Topics    Alcohol use:  No                                Counseling given: Not Answered      Vital Signs (Current):   Vitals:    22 1231   BP: 131/75   Pulse: 75   Resp: 16   Temp: 97.4 °F (36.3 °C)   TempSrc: Tympanic   SpO2: 99%   Weight: 258 lb (117 kg)   Height: 6' 3\" (1.905 m)                                              BP Readings from Last 3 Encounters:   12/29/22 131/75   11/14/22 122/70   05/09/22 134/72       NPO Status: Time of last liquid consumption: 0000                        Time of last solid consumption: 0000                        Date of last liquid consumption: 12/28/22                        Date of last solid food consumption: 12/28/22    BMI:   Wt Readings from Last 3 Encounters:   12/29/22 258 lb (117 kg)   12/28/22 258 lb (117 kg)   11/18/22 261 lb (118.4 kg)     Body mass index is 32.25 kg/m². CBC:   Lab Results   Component Value Date/Time    WBC 6.3 11/07/2022 07:34 AM    RBC 4.84 11/07/2022 07:34 AM    HGB 14.8 11/07/2022 07:34 AM    HCT 45.2 11/07/2022 07:34 AM    MCV 93.4 11/07/2022 07:34 AM    RDW 13.0 11/07/2022 07:34 AM     11/07/2022 07:34 AM       CMP:   Lab Results   Component Value Date/Time     11/07/2022 07:34 AM    K 4.1 11/07/2022 07:34 AM     11/07/2022 07:34 AM    CO2 22 11/07/2022 07:34 AM    BUN 14 11/07/2022 07:34 AM    CREATININE 0.8 11/07/2022 07:34 AM    GFRAA >59 04/29/2022 02:31 PM    LABGLOM >60 11/07/2022 07:34 AM    GLUCOSE 94 11/07/2022 07:34 AM    PROT 6.6 11/07/2022 07:34 AM    CALCIUM 9.3 11/07/2022 07:34 AM    BILITOT 0.5 11/07/2022 07:34 AM    ALKPHOS 78 11/07/2022 07:34 AM    AST 17 11/07/2022 07:34 AM    ALT 27 11/07/2022 07:34 AM       POC Tests: No results for input(s): POCGLU, POCNA, POCK, POCCL, POCBUN, POCHEMO, POCHCT in the last 72 hours.     Coags:   Lab Results   Component Value Date/Time    PROTIME 12.4 06/04/2017 12:10 PM    INR 0.93 06/04/2017 12:10 PM       HCG (If Applicable): No results found for: PREGTESTUR, PREGSERUM, HCG, HCGQUANT     ABGs: No results found for: PHART, PO2ART, HGH2JYC, QGV6WKB, BEART, C7JRNYTX     Type & Screen (If Applicable):  No results found for: LABABO, LABRH    Drug/Infectious Status (If Applicable):  No results found for: HIV, HEPCAB    COVID-19 Screening (If Applicable): No results found for: COVID19        Anesthesia Evaluation  Patient summary reviewed and Nursing notes reviewed no history of anesthetic complications:   Airway: Mallampati: II  TM distance: >3 FB   Neck ROM: full  Mouth opening: > = 3 FB   Dental: normal exam         Pulmonary:   (+) recent URI:  sleep apnea: on CPAP,      (-) asthma and shortness of breath                          ROS comment: + sinus drainage x 1 week   Cardiovascular:  Exercise tolerance: good (>4 METS),   (+) hypertension:, hyperlipidemia    (-) pacemaker, past MI, CAD, CABG/stent, dysrhythmias and  angina    ECG reviewed               Beta Blocker:  Not on Beta Blocker         Neuro/Psych:      (-) seizures and CVA           GI/Hepatic/Renal:   (+) GERD: well controlled, renal disease: kidney stones,      (-) liver disease       Endo/Other:    (+) hypothyroidism::., no malignancy/cancer. (-) diabetes mellitus, blood dyscrasia, no malignancy/cancer               Abdominal:             Vascular:     - DVT and PE. Other Findings:           Anesthesia Plan      general and regional     ASA 2     (Left interscalene block. pepcid and decadron in preop.)  Induction: intravenous. MIPS: Postoperative opioids intended and Prophylactic antiemetics administered. Anesthetic plan and risks discussed with patient.                         Lauren Mayen DO   12/29/2022

## 2022-12-29 NOTE — BRIEF OP NOTE
Brief Postoperative Note      Patient: Chika Cisse  YOB: 1975  MRN: [id number]    Date of Procedure: 12/29/2022    Pre-Op Diagnosis: Partial tear of subscapularis tendon, left, subsequent encounter [S46.722N]  Biceps tendonosis of left shoulder [M67.814]    Post-Op Diagnosis: Same       Procedure(s):  LEFT SHOULDER SUBSCAPULARIS DEBRIDEMENT, BICEPS TENODESIS/TENOTOMY    Surgeon(s):  Roberto Waterman MD    Assistant:  * No surgical staff found *    Anesthesia: General    Estimated Blood Loss (mL): less than 50     Complications: None    Specimens:   * No specimens in log *    Implants:  Implant Name Type Inv.  Item Serial No.  Lot No. LRB No. Used Action   SCREW INTFR L12MM DIA8MM BIOCOMPOSITE FACILITATE IORT TISS - [id number]  SCREW INTFR L12MM DIA8MM BIOCOMPOSITE FACILITATE IORT TISS  [employer] [id number] Left 1 Implanted         Drains: * No LDAs found *    Findings: see op note    Electronically signed by Roberto Waterman MD on 12/29/2022 at 4:56 PM

## 2023-02-14 DIAGNOSIS — N40.1 BENIGN PROSTATIC HYPERPLASIA WITH WEAK URINARY STREAM: ICD-10-CM

## 2023-02-14 DIAGNOSIS — I10 HYPERTENSION, UNSPECIFIED TYPE: ICD-10-CM

## 2023-02-14 DIAGNOSIS — R39.12 BENIGN PROSTATIC HYPERPLASIA WITH WEAK URINARY STREAM: ICD-10-CM

## 2023-02-14 NOTE — TELEPHONE ENCOUNTER
Liana Williamson called to request a refill on his medication.       Last office visit : 11/14/2022   Next office visit : Visit date not found     Requested Prescriptions     Pending Prescriptions Disp Refills    olmesartan (BENICAR) 20 MG tablet [Pharmacy Med Name: OLMESARTAN MEDOXOMIL TABS 20MG] 90 tablet 3     Sig: TAKE 1 [address] [facility]

## 2023-02-15 RX ORDER — OLMESARTAN MEDOXOMIL 20 MG/1
TABLET ORAL
Qty: 90 TABLET | Refills: 3 | Status: SHIPPED | OUTPATIENT
Start: 2023-02-15

## 2023-02-15 RX ORDER — TAMSULOSIN HYDROCHLORIDE 0.4 MG/1
CAPSULE ORAL
Qty: 90 CAPSULE | Refills: 3 | Status: SHIPPED | OUTPATIENT
Start: 2023-02-15

## 2023-05-15 NOTE — PROGRESS NOTES
"Subjective    Mr. Rutherford is 48 y.o. male    Chief Complaint: Vasectomy Consult    History of Present Illness  48-year-old male new patient requesting vasectomy for permanent sterilization.  He denies scrotal pain.  He takes tamsulosin for Dr. Tripp for history of enlarged prostate.  He also complains of difficulty maintaining erections and is requesting treatment for ED.    The following portions of the patient's history were reviewed and updated as appropriate: allergies, current medications, past family history, past medical history, past social history, past surgical history and problem list.    Review of Systems      Current Outpatient Medications:     amLODIPine (NORVASC) 10 MG tablet, Take 1 tablet by mouth Daily., Disp: , Rfl:     atorvastatin (LIPITOR) 10 MG tablet, Take 1 tablet by mouth Daily., Disp: , Rfl:     olmesartan (BENICAR) 20 MG tablet, Take 1 tablet by mouth Daily., Disp: , Rfl:     tamsulosin (FLOMAX) 0.4 MG capsule 24 hr capsule, Take 1 capsule by mouth Daily., Disp: , Rfl:     tadalafil (Cialis) 20 MG tablet, 1/2 to 1 tab by mouth 1 hour prior to intercourse, Disp: 30 tablet, Rfl: 11    Past Medical History:   Diagnosis Date    Anxiety     Hyperlipidemia     Hypertension        Past Surgical History:   Procedure Laterality Date    APPENDECTOMY      CHOLECYSTECTOMY      COLONOSCOPY  08/06/2010    normal    ENDOSCOPY  01/02/2013    mild gastritis/esophagitis    ENDOSCOPY N/A 2/13/2018    Procedure: ESOPHAGOGASTRODUODENOSCOPY WITH ANESTHESIA;  Surgeon: Isaias Horvath DO;  Location: [facility] ENDOSCOPY;  Service:        Social History     Socioeconomic History    Marital status:    Tobacco Use    Smoking status: Former    Smokeless tobacco: Never   Substance and Sexual Activity    Alcohol use: Yes     Comment: rare    Drug use: No       Family History   Problem Relation Age of Onset    Elizabeth's esophagus Mother        Objective    Temp 97.6 °F (36.4 °C)   Ht 190.5 cm (75\")   Wt 128 kg " (282 lb)   BMI 35.25 kg/m²     Physical Exam  Penis and testicles normal.  Vasa palpable bilaterally.  No inguinal hernia appreciable.      Results for orders placed or performed during the hospital encounter of 02/13/18   Urease For H Pylori - Tissue, Stomach    Specimen: Stomach; Tissue   Result Value Ref Range    Urease Negative Negative     Assessment and Plan    Diagnoses and all orders for this visit:    1. Sterilization (Primary)  -     Vasectomy; Future    2. Erectile dysfunction due to arterial insufficiency  -     tadalafil (Cialis) 20 MG tablet; 1/2 to 1 tab by mouth 1 hour prior to intercourse  Dispense: 30 tablet; Refill: 11      We spent time today discussing elective nature of vasectomy including the risks, benefits, and alternatives.  We discussed risk for infection, bleeding, need for additional procedures, loss of testicle, chronic pain, failure procedure, need to continue back of her current birth control method until sterility is confirmed.  He voiced understanding and provided informed consent to proceed in the clinic in the next few weeks.     He is also requesting treatment for ED for which I recommended a trial of PDE inhibitor therapy.  He understands no nitrates.  Continue tamsulosin from Dr. Tripp.      This document has been signed by ROMARIO Chaudhary MD on May 24, 2023 17:47 CDT

## 2023-05-24 ENCOUNTER — OFFICE VISIT (OUTPATIENT)
Dept: UROLOGY | Facility: CLINIC | Age: 48
End: 2023-05-24
Payer: COMMERCIAL

## 2023-05-24 VITALS — BODY MASS INDEX: 35.06 KG/M2 | WEIGHT: 282 LBS | HEIGHT: 75 IN | TEMPERATURE: 97.6 F

## 2023-05-24 DIAGNOSIS — N52.01 ERECTILE DYSFUNCTION DUE TO ARTERIAL INSUFFICIENCY: ICD-10-CM

## 2023-05-24 DIAGNOSIS — Z30.2 STERILIZATION: Primary | ICD-10-CM

## 2023-05-24 PROCEDURE — 99204 OFFICE O/P NEW MOD 45 MIN: CPT | Performed by: UROLOGY

## 2023-05-24 RX ORDER — TAMSULOSIN HYDROCHLORIDE 0.4 MG/1
1 CAPSULE ORAL DAILY
COMMUNITY
Start: 2023-02-15

## 2023-05-24 RX ORDER — OLMESARTAN MEDOXOMIL 20 MG/1
1 TABLET ORAL DAILY
COMMUNITY
Start: 2023-02-15

## 2023-05-24 RX ORDER — TADALAFIL 20 MG/1
TABLET ORAL
Qty: 30 TABLET | Refills: 11 | Status: SHIPPED | OUTPATIENT
Start: 2023-05-24

## 2023-05-24 NOTE — LETTER
May 24, 2023     JOSEPH Ortiz  [address]  [place] [address]    Patient: Be Rutherford   YOB: 1975   Date of Visit: 5/24/2023     Dear JOSEPH Webb:    Thank you for referring Be Rutherford to me for evaluation. Below are the relevant portions of my assessment and plan of care.    If you have questions, please do not hesitate to call me. I look forward to following Be along with you.         Sincerely,        Braulio Chaudhary MD        CC: No Recipients      Progress Notes:  Subjective    Mr. Rutherford is 48 y.o. male    Chief Complaint: Vasectomy Consult    History of Present Illness  48-year-old male new patient requesting vasectomy for permanent sterilization.  He denies scrotal pain.  He takes tamsulosin for Dr. Tripp for history of enlarged prostate.  He also complains of difficulty maintaining erections and is requesting treatment for ED.    The following portions of the patient's history were reviewed and updated as appropriate: allergies, current medications, past family history, past medical history, past social history, past surgical history and problem list.    Review of Systems      Current Outpatient Medications:   •  amLODIPine (NORVASC) 10 MG tablet, Take 1 tablet by mouth Daily., Disp: , Rfl:   •  atorvastatin (LIPITOR) 10 MG tablet, Take 1 tablet by mouth Daily., Disp: , Rfl:   •  olmesartan (BENICAR) 20 MG tablet, Take 1 tablet by mouth Daily., Disp: , Rfl:   •  tamsulosin (FLOMAX) 0.4 MG capsule 24 hr capsule, Take 1 capsule by mouth Daily., Disp: , Rfl:   •  tadalafil (Cialis) 20 MG tablet, 1/2 to 1 tab by mouth 1 hour prior to intercourse, Disp: 30 tablet, Rfl: 11    Past Medical History:   Diagnosis Date   • Anxiety    • Hyperlipidemia    • Hypertension        Past Surgical History:   Procedure Laterality Date   • APPENDECTOMY     • CHOLECYSTECTOMY     • COLONOSCOPY  08/06/2010    normal   • ENDOSCOPY  01/02/2013    mild gastritis/esophagitis   • ENDOSCOPY N/A  "2/13/2018    Procedure: ESOPHAGOGASTRODUODENOSCOPY WITH ANESTHESIA;  Surgeon: Isaias Horvath DO;  Location: [facility] ENDOSCOPY;  Service:        Social History     Socioeconomic History   • Marital status:    Tobacco Use   • Smoking status: Former   • Smokeless tobacco: Never   Substance and Sexual Activity   • Alcohol use: Yes     Comment: rare   • Drug use: No       Family History   Problem Relation Age of Onset   • Elizabeth's esophagus Mother        Objective    Temp 97.6 °F (36.4 °C)   Ht 190.5 cm (75\")   Wt 128 kg (282 lb)   BMI 35.25 kg/m²     Physical Exam  Penis and testicles normal.  Vasa palpable bilaterally.  No inguinal hernia appreciable.      Results for orders placed or performed during the hospital encounter of 02/13/18   Urease For H Pylori - Tissue, Stomach    Specimen: Stomach; Tissue   Result Value Ref Range    Urease Negative Negative     Assessment and Plan    Diagnoses and all orders for this visit:    1. Sterilization (Primary)  -     Vasectomy; Future    2. Erectile dysfunction due to arterial insufficiency  -     tadalafil (Cialis) 20 MG tablet; 1/2 to 1 tab by mouth 1 hour prior to intercourse  Dispense: 30 tablet; Refill: 11      We spent time today discussing elective nature of vasectomy including the risks, benefits, and alternatives.  We discussed risk for infection, bleeding, need for additional procedures, loss of testicle, chronic pain, failure procedure, need to continue back of her current birth control method until sterility is confirmed.  He voiced understanding and provided informed consent to proceed in the clinic in the next few weeks.     He is also requesting treatment for ED for which I recommended a trial of PDE inhibitor therapy.  He understands no nitrates.  Continue tamsulosin from Dr. Tripp.      This document has been signed by ROMARIO Chaudhary MD on May 24, 2023 17:47 CDT              "

## 2023-06-02 DIAGNOSIS — Z30.2 STERILIZATION: Primary | ICD-10-CM

## 2023-06-02 RX ORDER — ALPRAZOLAM 1 MG/1
TABLET ORAL
Qty: 1 TABLET | Refills: 0 | Status: SHIPPED | OUTPATIENT
Start: 2023-06-02

## 2023-06-02 RX ORDER — NAPROXEN 500 MG/1
500 TABLET ORAL 2 TIMES DAILY WITH MEALS
Qty: 60 TABLET | Refills: 0 | Status: SHIPPED | OUTPATIENT
Start: 2023-06-02

## 2023-06-02 RX ORDER — HYDROCODONE BITARTRATE AND ACETAMINOPHEN 5; 325 MG/1; MG/1
1 TABLET ORAL EVERY 6 HOURS PRN
Qty: 12 TABLET | Refills: 0 | Status: SHIPPED | OUTPATIENT
Start: 2023-06-02

## 2023-06-05 ENCOUNTER — PROCEDURE VISIT (OUTPATIENT)
Dept: UROLOGY | Facility: CLINIC | Age: 48
End: 2023-06-05
Payer: COMMERCIAL

## 2023-06-05 DIAGNOSIS — Z30.2 STERILIZATION: ICD-10-CM

## 2023-06-05 PROCEDURE — 55250 REMOVAL OF SPERM DUCT(S): CPT | Performed by: UROLOGY

## 2023-06-05 NOTE — PROGRESS NOTES
CC: I am here to have a vasectomy    Vasectomy procedure note  Patient has been premedicated with alprazolam and Norco.  He has done the appropriate shave the day before the procedure.  He is placed in the supine position.  The usual prep and drape with Betadine is carried out.  The vas is palpated on the right side and  from the remainder of the cord bluntly and pulled just underneath the skin.  1% lidocaine is infiltrated in the subcutaneous tissue.  An incision is made directly onto the vas.  The perivasal tissue was bluntly stripped away from the vas deferens freeing an approximate 2 cm segment.  Titanium clips were placed both proximally and distally and the intervening segment excised.  The specimen,is discarded.  The ends are fulgurated with electrocautery as well as any vessels.    The left-sided vasectomy was carried out the same as the right with no change in findings or technique.  Both wounds are irrigated with sterile saline.  Any subcutaneous vessels that are bleeding are fulgurated.  The skin is closed with a running 3-0 chromic suture.    The patient tolerated this procedure well.  Postoperative instructions were given.  He is reminded that we will need to see  a sample after approximately 20-25 ejaculations to determine whether or not he has had a successful vasectomy.  He is encouraged to use birth control up until that time.    Braulio Chaudhary MD  6/5/2023  15:23 CDT

## 2023-07-27 DIAGNOSIS — F34.1 DYSTHYMIA: ICD-10-CM

## 2023-07-27 DIAGNOSIS — E03.8 OTHER SPECIFIED HYPOTHYROIDISM: ICD-10-CM

## 2023-07-28 RX ORDER — LEVOTHYROXINE SODIUM 0.2 MG/1
TABLET ORAL
Qty: 90 TABLET | Refills: 2 | Status: SHIPPED | OUTPATIENT
Start: 2023-07-28

## 2023-07-28 RX ORDER — LEVOTHYROXINE SODIUM 0.05 MG/1
TABLET ORAL
Qty: 90 TABLET | Refills: 2 | Status: SHIPPED | OUTPATIENT
Start: 2023-07-28

## 2023-07-28 RX ORDER — ESOMEPRAZOLE MAGNESIUM 40 MG/1
CAPSULE, DELAYED RELEASE ORAL
Qty: 90 CAPSULE | Refills: 2 | Status: SHIPPED | OUTPATIENT
Start: 2023-07-28

## 2023-07-28 RX ORDER — BUPROPION HYDROCHLORIDE 300 MG/1
TABLET ORAL
Qty: 90 TABLET | Refills: 2 | Status: SHIPPED | OUTPATIENT
Start: 2023-07-28

## 2023-07-28 NOTE — TELEPHONE ENCOUNTER
Last OV 11/14/2022  Next OV Visit date not found      Requested Prescriptions     Pending Prescriptions Disp Refills    levothyroxine (SYNTHROID) 50 MCG tablet [Pharmacy Med Name: Levothyroxine Sodium 50mcg Tablet] 90 tablet 2     Sig: Take 1 tablet by mouth daily 30 minutes before breakfast.    buPROPion (WELLBUTRIN XL) 300 MG extended release tablet [Pharmacy Med Name: Bupropion Hydrochloride 300mg Extended-Release (XL) Tablet] 90 tablet 2     Sig: Take 1 tablet by mouth daily.     levothyroxine (SYNTHROID) 200 MCG tablet [Pharmacy Med Name: Levothyroxine Sodium 200mcg Tablet] 90 tablet 2     Sig: Take 1 tablet by mouth daily 30 minutes before breakfast.    esomeprazole (NEXIUM) 40 MG delayed release capsule [Pharmacy Med Name: Esomeprazole Magnesium 40mg Delayed-Release Capsule] 90 capsule 2     Sig: Take 1 capsule by mouth every morning before breakfast.

## 2023-09-15 ENCOUNTER — HOSPITAL ENCOUNTER (OUTPATIENT)
Dept: GENERAL RADIOLOGY | Age: 48
Discharge: HOME | End: 2023-09-15
Payer: COMMERCIAL

## 2023-09-15 ENCOUNTER — OFFICE VISIT (OUTPATIENT)
Dept: FAMILY MEDICINE CLINIC | Age: 48
End: 2023-09-15
Payer: COMMERCIAL

## 2023-09-15 VITALS
DIASTOLIC BLOOD PRESSURE: 72 MMHG | BODY MASS INDEX: 34.19 KG/M2 | HEIGHT: 75 IN | SYSTOLIC BLOOD PRESSURE: 122 MMHG | WEIGHT: 275 LBS | TEMPERATURE: 97 F | OXYGEN SATURATION: 99 % | HEART RATE: 81 BPM | RESPIRATION RATE: 20 BRPM

## 2023-09-15 DIAGNOSIS — M79.671 ACUTE FOOT PAIN, RIGHT: ICD-10-CM

## 2023-09-15 DIAGNOSIS — M25.561 BILATERAL CHRONIC KNEE PAIN: ICD-10-CM

## 2023-09-15 DIAGNOSIS — Z82.49 FAMILY HISTORY OF CORONARY ARTERY DISEASE: ICD-10-CM

## 2023-09-15 DIAGNOSIS — G89.29 BILATERAL CHRONIC KNEE PAIN: ICD-10-CM

## 2023-09-15 DIAGNOSIS — M25.562 BILATERAL CHRONIC KNEE PAIN: ICD-10-CM

## 2023-09-15 DIAGNOSIS — M79.671 ACUTE FOOT PAIN, RIGHT: Primary | ICD-10-CM

## 2023-09-15 DIAGNOSIS — I10 HYPERTENSION, UNSPECIFIED TYPE: ICD-10-CM

## 2023-09-15 DIAGNOSIS — E78.2 MIXED HYPERLIPIDEMIA: ICD-10-CM

## 2023-09-15 PROCEDURE — 3078F DIAST BP <80 MM HG: CPT | Performed by: NURSE PRACTITIONER

## 2023-09-15 PROCEDURE — 73562 X-RAY EXAM OF KNEE 3: CPT

## 2023-09-15 PROCEDURE — 3074F SYST BP LT 130 MM HG: CPT | Performed by: NURSE PRACTITIONER

## 2023-09-15 PROCEDURE — 73630 X-RAY EXAM OF FOOT: CPT

## 2023-09-15 PROCEDURE — 99214 OFFICE O/P EST MOD 30 MIN: CPT | Performed by: NURSE PRACTITIONER

## 2023-09-15 SDOH — ECONOMIC STABILITY: FOOD INSECURITY: WITHIN THE PAST 12 MONTHS, THE FOOD YOU BOUGHT JUST DIDN'T LAST AND YOU DIDN'T HAVE MONEY TO GET MORE.: NEVER TRUE

## 2023-09-15 SDOH — ECONOMIC STABILITY: HOUSING INSECURITY
IN THE LAST 12 MONTHS, WAS THERE A TIME WHEN YOU DID NOT HAVE A STEADY PLACE TO SLEEP OR SLEPT IN A SHELTER (INCLUDING NOW)?: NO

## 2023-09-15 SDOH — ECONOMIC STABILITY: INCOME INSECURITY: HOW HARD IS IT FOR YOU TO PAY FOR THE VERY BASICS LIKE FOOD, HOUSING, MEDICAL CARE, AND HEATING?: NOT HARD AT ALL

## 2023-09-15 SDOH — ECONOMIC STABILITY: FOOD INSECURITY: WITHIN THE PAST 12 MONTHS, YOU WORRIED THAT YOUR FOOD WOULD RUN OUT BEFORE YOU GOT MONEY TO BUY MORE.: NEVER TRUE

## 2023-09-15 ASSESSMENT — PATIENT HEALTH QUESTIONNAIRE - PHQ9
7. TROUBLE CONCENTRATING ON THINGS, SUCH AS READING THE NEWSPAPER OR WATCHING TELEVISION: 0
SUM OF ALL RESPONSES TO PHQ QUESTIONS 1-9: 11
3. TROUBLE FALLING OR STAYING ASLEEP: 0
1. LITTLE INTEREST OR PLEASURE IN DOING THINGS: 2
6. FEELING BAD ABOUT YOURSELF - OR THAT YOU ARE A FAILURE OR HAVE LET YOURSELF OR YOUR FAMILY DOWN: 3
10. IF YOU CHECKED OFF ANY PROBLEMS, HOW DIFFICULT HAVE THESE PROBLEMS MADE IT FOR YOU TO DO YOUR WORK, TAKE CARE OF THINGS AT HOME, OR GET ALONG WITH OTHER PEOPLE: 1
9. THOUGHTS THAT YOU WOULD BE BETTER OFF DEAD, OR OF HURTING YOURSELF: 0
SUM OF ALL RESPONSES TO PHQ QUESTIONS 1-9: 11
SUM OF ALL RESPONSES TO PHQ9 QUESTIONS 1 & 2: 4
SUM OF ALL RESPONSES TO PHQ QUESTIONS 1-9: 11
4. FEELING TIRED OR HAVING LITTLE ENERGY: 0
5. POOR APPETITE OR OVEREATING: 1
2. FEELING DOWN, DEPRESSED OR HOPELESS: 2
8. MOVING OR SPEAKING SO SLOWLY THAT OTHER PEOPLE COULD HAVE NOTICED. OR THE OPPOSITE, BEING SO FIGETY OR RESTLESS THAT YOU HAVE BEEN MOVING AROUND A LOT MORE THAN USUAL: 3
SUM OF ALL RESPONSES TO PHQ QUESTIONS 1-9: 11

## 2023-09-15 ASSESSMENT — ANXIETY QUESTIONNAIRES
1. FEELING NERVOUS, ANXIOUS, OR ON EDGE: 1
2. NOT BEING ABLE TO STOP OR CONTROL WORRYING: 1
IF YOU CHECKED OFF ANY PROBLEMS ON THIS QUESTIONNAIRE, HOW DIFFICULT HAVE THESE PROBLEMS MADE IT FOR YOU TO DO YOUR WORK, TAKE CARE OF THINGS AT HOME, OR GET ALONG WITH OTHER PEOPLE: SOMEWHAT DIFFICULT
5. BEING SO RESTLESS THAT IT IS HARD TO SIT STILL: 0
6. BECOMING EASILY ANNOYED OR IRRITABLE: 1
4. TROUBLE RELAXING: 0
7. FEELING AFRAID AS IF SOMETHING AWFUL MIGHT HAPPEN: 2
3. WORRYING TOO MUCH ABOUT DIFFERENT THINGS: 2
GAD7 TOTAL SCORE: 7

## 2023-09-15 ASSESSMENT — ENCOUNTER SYMPTOMS: RESPIRATORY NEGATIVE: 1

## 2023-09-15 NOTE — PROGRESS NOTES
Difficulty of Paying Living Expenses: Not hard at all   Food Insecurity: No Food Insecurity (9/15/2023)    Hunger Vital Sign     Worried About Running Out of Food in the Last Year: Never true     Ran Out of Food in the Last Year: Never true   Transportation Needs: Unknown (9/15/2023)    PRAPARE - Transportation     Lack of Transportation (Medical): Not on file     Lack of Transportation (Non-Medical): No   Physical Activity: Not on file   Stress: Not on file   Social Connections: Not on file   Intimate Partner Violence: Not on file   Housing Stability: Unknown (9/15/2023)    Housing Stability Vital Sign     Unable to Pay for Housing in the Last Year: Not on file     Number of Places Lived in the Last Year: Not on file     Unstable Housing in the Last Year: No       Review of Systems   Constitutional: Negative. Respiratory: Negative. Cardiovascular: Negative. Musculoskeletal:  Positive for arthralgias. Psychiatric/Behavioral:  The patient is nervous/anxious. OBJECTIVE:    Physical Exam  Vitals and nursing note reviewed. Constitutional:       General: He is not in acute distress. Appearance: Normal appearance. He is well-developed. He is not ill-appearing or toxic-appearing. HENT:      Head: Normocephalic and atraumatic. Eyes:      Extraocular Movements: Extraocular movements intact. Conjunctiva/sclera: Conjunctivae normal.      Pupils: Pupils are equal, round, and reactive to light. Neck:      Trachea: No tracheal deviation. Cardiovascular:      Rate and Rhythm: Normal rate and regular rhythm. Heart sounds: Normal heart sounds. No murmur heard. Pulmonary:      Effort: Pulmonary effort is normal.      Breath sounds: Normal breath sounds. Musculoskeletal:         General: Tenderness (right lateral foot tenderness) present. Cervical back: Normal range of motion. Right lower leg: No edema. Left lower leg: No edema. Skin:     General: Skin is warm and dry.

## 2023-09-19 ENCOUNTER — TELEPHONE (OUTPATIENT)
Dept: FAMILY MEDICINE CLINIC | Age: 48
End: 2023-09-19

## 2023-09-19 NOTE — TELEPHONE ENCOUNTER
----- Message from DENISHA Miller sent at 9/19/2023  8:55 AM CDT -----  X-ray right foot is showing good mineralization of the bones however there is a heel spur noted. This heel spur is certainly not at the area where the discomfort is on that outside of the foot, but there may be unintentional weight distribution changes related to the heel spur that is causing more pressure to that lateral aspect of the foot that could have triggered the pain. If the foot symptoms continue, I do encourage a podiatry evaluation.

## 2023-09-19 NOTE — TELEPHONE ENCOUNTER
Called patient, verified name and date of birth, and gave results in detail as per provider notes below. Patient verbalized understanding and had no further questions. Patient agrees to the MRI of L knee.

## 2023-09-29 DIAGNOSIS — I10 HYPERTENSION, UNSPECIFIED TYPE: ICD-10-CM

## 2023-09-29 DIAGNOSIS — E78.2 MIXED HYPERLIPIDEMIA: ICD-10-CM

## 2023-09-29 DIAGNOSIS — Z82.49 FAMILY HISTORY OF CORONARY ARTERY DISEASE: ICD-10-CM

## 2023-10-27 ENCOUNTER — HOSPITAL ENCOUNTER (OUTPATIENT)
Dept: MRI IMAGING | Age: 48
Discharge: HOME | End: 2023-10-27
Payer: COMMERCIAL

## 2023-10-27 DIAGNOSIS — G89.29 BILATERAL CHRONIC KNEE PAIN: ICD-10-CM

## 2023-10-27 DIAGNOSIS — M25.561 BILATERAL CHRONIC KNEE PAIN: ICD-10-CM

## 2023-10-27 DIAGNOSIS — M25.562 BILATERAL CHRONIC KNEE PAIN: ICD-10-CM

## 2023-10-27 PROCEDURE — 73721 MRI JNT OF LWR EXTRE W/O DYE: CPT

## 2023-10-31 DIAGNOSIS — F41.9 ANXIETY: ICD-10-CM

## 2023-10-31 DIAGNOSIS — I10 ESSENTIAL HYPERTENSION: ICD-10-CM

## 2023-10-31 DIAGNOSIS — E78.2 MIXED HYPERLIPIDEMIA: ICD-10-CM

## 2023-10-31 DIAGNOSIS — E55.9 VITAMIN D DEFICIENCY: ICD-10-CM

## 2023-10-31 NOTE — TELEPHONE ENCOUNTER
Aleena Witt called to request a refill on his medication.       Last office visit : 9/15/2023   Next office visit : Visit date not found     Requested Prescriptions     Pending Prescriptions Disp Refills    amLODIPine (NORVASC) 5 MG tablet [Pharmacy Med Name: AMLODIPINE BESYLATE TABS 5MG] 90 tablet 3     Sig: TAKE 1 TABLET DAILY    vitamin D (ERGOCALCIFEROL) 1.25 MG (25997 UT) CAPS capsule [Pharmacy Med Name: VIT D-2 CAP(ERGOCAL)1.25MG 50,000U] 12 capsule 3     Sig: TAKE 1 CAPSULE ONCE WEEKLY    atorvastatin (LIPITOR) 40 MG tablet [Pharmacy Med Name: ATORVASTATIN TABS 40MG] 90 tablet 3     Sig: TAKE 1 TABLET NIGHTLY FOR CHOLESTEROL    FLUoxetine (PROZAC) 10 MG capsule [Pharmacy Med Name: FLUOXETINE HCL CAPS 10MG] 90 capsule 3     Sig: TAKE 1 CAPSULE DAILY            Lida Zamudio [facility]

## 2023-11-01 RX ORDER — ERGOCALCIFEROL 1.25 MG/1
CAPSULE ORAL
Qty: 12 CAPSULE | Refills: 3 | Status: SHIPPED | OUTPATIENT
Start: 2023-11-01

## 2023-11-01 RX ORDER — FLUOXETINE 10 MG/1
CAPSULE ORAL
Qty: 90 CAPSULE | Refills: 3 | Status: SHIPPED | OUTPATIENT
Start: 2023-11-01

## 2023-11-01 RX ORDER — ATORVASTATIN CALCIUM 40 MG/1
TABLET, FILM COATED ORAL
Qty: 90 TABLET | Refills: 3 | Status: SHIPPED | OUTPATIENT
Start: 2023-11-01

## 2023-11-01 RX ORDER — AMLODIPINE BESYLATE 5 MG/1
TABLET ORAL
Qty: 90 TABLET | Refills: 3 | Status: SHIPPED | OUTPATIENT
Start: 2023-11-01

## 2023-11-07 ENCOUNTER — TELEPHONE (OUTPATIENT)
Dept: FAMILY MEDICINE CLINIC | Age: 48
End: 2023-11-07

## 2023-11-07 DIAGNOSIS — R93.6 ABNORMAL MRI, KNEE: ICD-10-CM

## 2023-11-07 DIAGNOSIS — M25.562 CHRONIC PAIN OF LEFT KNEE: Primary | ICD-10-CM

## 2023-11-07 DIAGNOSIS — G89.29 CHRONIC PAIN OF LEFT KNEE: Primary | ICD-10-CM

## 2023-11-07 NOTE — TELEPHONE ENCOUNTER
----- Message from DENISHA Doss sent at 10/27/2023  5:14 PM CDT -----  MRI showing there is tendinosis of that patellar tendon but no tear identified. There is swelling of the soft tissue. Quadriceps tendinosis is also noted as well as an old injury of the medial patellar complex. Findings are supportive of chronic sprain and scarring of the posterior cruciate and anterior cruciate ligaments. There is concern of an old flake avulsion fracture. Sprain/scarring in the medial and lateral collateral ligaments. Also a partial-thickness tear of the popliteus/tendinosis. As Truman Montes knows, he does have significant osteoarthritis. I do want him to see orthopedic (his choice of location, pend to me) and he will need to take the disc for review. I am certain he would like to get them before the end of the year.

## 2023-11-07 NOTE — TELEPHONE ENCOUNTER
Referral pended please attach diagnosis codes to the order and then I can fax to [address].

## 2023-12-04 DIAGNOSIS — E78.2 MIXED HYPERLIPIDEMIA: ICD-10-CM

## 2023-12-04 DIAGNOSIS — N40.1 BENIGN PROSTATIC HYPERPLASIA WITH WEAK URINARY STREAM: ICD-10-CM

## 2023-12-04 DIAGNOSIS — R73.01 IFG (IMPAIRED FASTING GLUCOSE): ICD-10-CM

## 2023-12-04 DIAGNOSIS — I10 ESSENTIAL HYPERTENSION: ICD-10-CM

## 2023-12-04 DIAGNOSIS — R39.12 BENIGN PROSTATIC HYPERPLASIA WITH WEAK URINARY STREAM: ICD-10-CM

## 2023-12-04 DIAGNOSIS — E55.9 VITAMIN D DEFICIENCY: ICD-10-CM

## 2023-12-04 DIAGNOSIS — E55.9 VITAMIN D DEFICIENCY: Primary | ICD-10-CM

## 2023-12-04 LAB
ALBUMIN SERPL-MCNC: 4 G/DL (ref 3.5–5.2)
ALP SERPL-CCNC: 75 U/L (ref 40–130)
ALT SERPL-CCNC: 21 U/L (ref 5–41)
ANION GAP SERPL CALCULATED.3IONS-SCNC: 11 MMOL/L (ref 7–19)
AST SERPL-CCNC: 14 U/L (ref 5–40)
BASOPHILS # BLD: 0.1 K/UL (ref 0–0.2)
BASOPHILS NFR BLD: 1.1 % (ref 0–1)
BILIRUB SERPL-MCNC: 0.4 MG/DL (ref 0.2–1.2)
BUN SERPL-MCNC: 9 MG/DL (ref 6–20)
CALCIUM SERPL-MCNC: 9 MG/DL (ref 8.6–10)
CHLORIDE SERPL-SCNC: 105 MMOL/L (ref 98–111)
CHOLEST SERPL-MCNC: 169 MG/DL (ref 160–199)
CO2 SERPL-SCNC: 25 MMOL/L (ref 22–29)
CREAT SERPL-MCNC: 0.7 MG/DL (ref 0.5–1.2)
EOSINOPHIL # BLD: 0.3 K/UL (ref 0–0.6)
EOSINOPHIL NFR BLD: 4.1 % (ref 0–5)
ERYTHROCYTE [DISTWIDTH] IN BLOOD BY AUTOMATED COUNT: 12.8 % (ref 11.5–14.5)
GLUCOSE SERPL-MCNC: 91 MG/DL (ref 74–109)
HBA1C MFR BLD: 5.6 % (ref 4–6)
HCT VFR BLD AUTO: 44.2 % (ref 42–52)
HDLC SERPL-MCNC: 53 MG/DL (ref 55–121)
HGB BLD-MCNC: 14.8 G/DL (ref 14–18)
IMM GRANULOCYTES # BLD: 0 K/UL
LDLC SERPL CALC-MCNC: 96 MG/DL
LYMPHOCYTES # BLD: 2.4 K/UL (ref 1.1–4.5)
LYMPHOCYTES NFR BLD: 28.3 % (ref 20–40)
MCH RBC QN AUTO: 31.5 PG (ref 27–31)
MCHC RBC AUTO-ENTMCNC: 33.5 G/DL (ref 33–37)
MCV RBC AUTO: 94 FL (ref 80–94)
MONOCYTES # BLD: 0.9 K/UL (ref 0–0.9)
MONOCYTES NFR BLD: 10.5 % (ref 0–10)
NEUTROPHILS # BLD: 4.6 K/UL (ref 1.5–7.5)
NEUTS SEG NFR BLD: 55.6 % (ref 50–65)
PLATELET # BLD AUTO: 289 K/UL (ref 130–400)
PMV BLD AUTO: 10 FL (ref 9.4–12.4)
POTASSIUM SERPL-SCNC: 4.1 MMOL/L (ref 3.5–5)
PROT SERPL-MCNC: 6.1 G/DL (ref 6.6–8.7)
RBC # BLD AUTO: 4.7 M/UL (ref 4.7–6.1)
SODIUM SERPL-SCNC: 141 MMOL/L (ref 136–145)
TRIGL SERPL-MCNC: 99 MG/DL (ref 0–149)
TSH SERPL DL<=0.005 MIU/L-ACNC: 1.77 UIU/ML (ref 0.35–5.5)
WBC # BLD AUTO: 8.3 K/UL (ref 4.8–10.8)

## 2023-12-05 LAB — 25(OH)D3 SERPL-MCNC: 33.2 NG/ML

## 2023-12-07 LAB
PSA FREE MFR SERPL: 17 %
PSA FREE SERPL-MCNC: 0.3 NG/ML
PSA SERPL-MCNC: 1.8 NG/ML (ref 0–4)

## 2023-12-08 ENCOUNTER — OFFICE VISIT (OUTPATIENT)
Dept: FAMILY MEDICINE CLINIC | Age: 48
End: 2023-12-08
Payer: COMMERCIAL

## 2023-12-08 VITALS
DIASTOLIC BLOOD PRESSURE: 66 MMHG | BODY MASS INDEX: 35.06 KG/M2 | HEART RATE: 78 BPM | WEIGHT: 282 LBS | SYSTOLIC BLOOD PRESSURE: 122 MMHG | TEMPERATURE: 97.1 F | RESPIRATION RATE: 20 BRPM | HEIGHT: 75 IN | OXYGEN SATURATION: 99 %

## 2023-12-08 DIAGNOSIS — I10 ESSENTIAL HYPERTENSION: ICD-10-CM

## 2023-12-08 DIAGNOSIS — E03.9 ACQUIRED HYPOTHYROIDISM: ICD-10-CM

## 2023-12-08 DIAGNOSIS — Z12.11 SCREEN FOR COLON CANCER: ICD-10-CM

## 2023-12-08 DIAGNOSIS — E78.2 MIXED HYPERLIPIDEMIA: ICD-10-CM

## 2023-12-08 DIAGNOSIS — Z82.49 FAMILY HISTORY OF CORONARY ARTERY DISEASE: ICD-10-CM

## 2023-12-08 DIAGNOSIS — Z00.00 ANNUAL PHYSICAL EXAM: Primary | ICD-10-CM

## 2023-12-08 DIAGNOSIS — Z83.3 FAMILY HISTORY OF DIABETES MELLITUS (DM): ICD-10-CM

## 2023-12-08 DIAGNOSIS — E55.9 VITAMIN D DEFICIENCY: ICD-10-CM

## 2023-12-08 DIAGNOSIS — R73.01 IFG (IMPAIRED FASTING GLUCOSE): ICD-10-CM

## 2023-12-08 DIAGNOSIS — I10 HYPERTENSION, UNSPECIFIED TYPE: ICD-10-CM

## 2023-12-08 DIAGNOSIS — Z11.4 ENCOUNTER FOR SCREENING FOR HIV: ICD-10-CM

## 2023-12-08 DIAGNOSIS — Z11.59 NEED FOR HEPATITIS C SCREENING TEST: ICD-10-CM

## 2023-12-08 PROCEDURE — 3074F SYST BP LT 130 MM HG: CPT | Performed by: NURSE PRACTITIONER

## 2023-12-08 PROCEDURE — 99396 PREV VISIT EST AGE 40-64: CPT | Performed by: NURSE PRACTITIONER

## 2023-12-08 PROCEDURE — 3078F DIAST BP <80 MM HG: CPT | Performed by: NURSE PRACTITIONER

## 2023-12-08 NOTE — PROGRESS NOTES
SUBJECTIVE:    Patient ID: Gia Garrido is a49 y.o. male. Gia Garrido is here today for Annual Exam (Patient presents for physical exam. Patient already had labs. )  . HPI:   HPI     Frustrations with wt. Has been lifting weights and states that he struggles with foods. Hyperlipidemia-statin regularly. Gerd-controlled with PPI, Nexium. Mom has Coello's but he is negative. Hypothyroid-adheres to daily tx plan. Past Medical History:   Diagnosis Date    Adenoma of left adrenal gland     Hyperlipidemia     Hypertension     Hypothyroidism     Kidney stone     Multiple thyroid nodules 06/2020    Unspecified sleep apnea      Prior to Visit Medications    Medication Sig Taking? Authorizing Provider   Tirzepatide [facility]) 2.5 MG/0.5ML SOPN SC injection Inject 0.5 mLs into the skin once a week ZEPBOUND Yes Sarah Zimmer APRN   amLODIPine (NORVASC) 5 MG tablet TAKE 1 TABLET DAILY Yes Sarah Zimmer APRN   vitamin D (ERGOCALCIFEROL) 1.25 MG (68398 UT) CAPS capsule TAKE 1 CAPSULE ONCE WEEKLY Yes Sarah Zimmer APRN   atorvastatin (LIPITOR) 40 MG tablet TAKE 1 TABLET NIGHTLY FOR CHOLESTEROL Yes Sarah Zimmer APRN   FLUoxetine (PROZAC) 10 MG capsule TAKE 1 CAPSULE DAILY Yes Sarah Zimmer APRN   levothyroxine (SYNTHROID) 50 MCG tablet Take 1 tablet by mouth daily 30 minutes before breakfast. Yes Sarah Zimmer APRN   buPROPion (WELLBUTRIN XL) 300 MG extended release tablet Take 1 tablet by mouth daily.  Yes Sarah Zimmer APRN   levothyroxine (SYNTHROID) 200 MCG tablet Take 1 tablet by mouth daily 30 minutes before breakfast. Yes Sarah Zimmer APRN   esomeprazole (NEXIUM) 40 MG delayed release capsule Take 1 capsule by mouth every morning before breakfast. Yes Sarah Zimmer APRN   olmesartan (BENICAR) 20 MG tablet TAKE 1 TABLET DAILY Yes Sarah Zimmer APRN   ondansetron (ZOFRAN) 4 MG tablet Take 1 tablet by mouth every 8 hours as needed for Nausea or Vomiting Yes Grace Lemos MD   tamsulosin (FLOMAX)

## 2023-12-11 ENCOUNTER — OFFICE VISIT (OUTPATIENT)
Dept: UROLOGY | Age: 48
End: 2023-12-11
Payer: COMMERCIAL

## 2023-12-11 VITALS — WEIGHT: 285.2 LBS | TEMPERATURE: 98.1 F | HEIGHT: 75 IN | BODY MASS INDEX: 35.46 KG/M2

## 2023-12-11 DIAGNOSIS — R39.12 BENIGN PROSTATIC HYPERPLASIA WITH WEAK URINARY STREAM: Primary | ICD-10-CM

## 2023-12-11 DIAGNOSIS — N40.1 BENIGN PROSTATIC HYPERPLASIA WITH WEAK URINARY STREAM: Primary | ICD-10-CM

## 2023-12-11 LAB
APPEARANCE FLUID: CLEAR
BILIRUBIN, POC: NORMAL
BLOOD URINE, POC: NORMAL
CLARITY, POC: CLEAR
COLOR, POC: YELLOW
GLUCOSE URINE, POC: NORMAL
KETONES, POC: NORMAL
LEUKOCYTE EST, POC: NORMAL
NITRITE, POC: NORMAL
PH, POC: 6
POST VOID RESIDUAL (PVR): 26 ML
PROTEIN, POC: NORMAL
SPECIFIC GRAVITY, POC: 1
UROBILINOGEN, POC: 0.2

## 2023-12-11 PROCEDURE — 99214 OFFICE O/P EST MOD 30 MIN: CPT | Performed by: UROLOGY

## 2023-12-11 PROCEDURE — 81002 URINALYSIS NONAUTO W/O SCOPE: CPT | Performed by: UROLOGY

## 2023-12-11 PROCEDURE — 51798 US URINE CAPACITY MEASURE: CPT | Performed by: UROLOGY

## 2023-12-11 RX ORDER — TAMSULOSIN HYDROCHLORIDE 0.4 MG/1
0.4 CAPSULE ORAL DAILY
Qty: 90 CAPSULE | Refills: 3 | Status: SHIPPED | OUTPATIENT
Start: 2023-12-11

## 2023-12-11 ASSESSMENT — ENCOUNTER SYMPTOMS
BACK PAIN: 0
EYE REDNESS: 0
EYE DISCHARGE: 0
COUGH: 0
SHORTNESS OF BREATH: 0
TROUBLE SWALLOWING: 0
CONSTIPATION: 0
DIARRHEA: 0
VOMITING: 0
ABDOMINAL PAIN: 0
ABDOMINAL DISTENTION: 0
NAUSEA: 0

## 2023-12-11 NOTE — PROGRESS NOTES
Musculoskeletal:         General: No tenderness or deformity. Normal range of motion. Cervical back: Normal range of motion and neck supple. Lymphadenopathy:      Cervical: No cervical adenopathy. Skin:     General: Skin is warm and dry. Findings: No erythema. Neurological:      General: No focal deficit present. Mental Status: He is alert and oriented to person, place, and time. Psychiatric:         Behavior: Behavior normal.         Judgment: Judgment normal.             DATA:  CMP:    Lab Results   Component Value Date/Time     12/04/2023 08:53 AM    K 4.1 12/04/2023 08:53 AM     12/04/2023 08:53 AM    CO2 25 12/04/2023 08:53 AM    BUN 9 12/04/2023 08:53 AM    CREATININE 0.7 12/04/2023 08:53 AM    GFRAA >59 04/29/2022 02:31 PM    LABGLOM >60 12/04/2023 08:53 AM    GLUCOSE 91 12/04/2023 08:53 AM    PROT 6.1 12/04/2023 08:53 AM    LABALBU 4.0 12/04/2023 08:53 AM    CALCIUM 9.0 12/04/2023 08:53 AM    BILITOT 0.4 12/04/2023 08:53 AM    ALKPHOS 75 12/04/2023 08:53 AM    AST 14 12/04/2023 08:53 AM    ALT 21 12/04/2023 08:53 AM     Results for orders placed or performed in visit on 12/11/23   POCT Urinalysis no Micro   Result Value Ref Range    Color, UA Yellow     Clarity, UA Clear     Glucose, UA POC Neg     Bilirubin, UA Neg     Ketones, UA Neg     Spec Grav, UA 1.005     Blood, UA POC Neg     pH, UA 6.0     Protein, UA POC Neg     Urobilinogen, UA 0.2     Leukocytes, UA Trace     Nitrite, UA Neg     Appearance, Fluid Clear Clear, Slightly Cloudy   PA Measure, post-void residual, US, non-imaging   Result Value Ref Range    post void residual 26 ml     Lab Results   Component Value Date    PSA 1.8 12/04/2023    PSA 1.7 11/07/2022    PSA 2.3 04/29/2022    PSA 3.8 01/31/2022    PSA 1.5 10/29/2021     Lab Results   Component Value Date    PSAFREEPCT 17 12/04/2023    PSAFREEPCT 18 11/07/2022    PSAFREEPCT 13 04/29/2022         1.  Benign prostatic hyperplasia with weak urinary

## 2023-12-14 ENCOUNTER — TELEPHONE (OUTPATIENT)
Dept: UROLOGY | Age: 48
End: 2023-12-14

## 2023-12-14 NOTE — TELEPHONE ENCOUNTER
Patient is requesting excuse from work for date of last visit 12/11. If approved, please Fax to [phone] attn Jessica Alvarado. If not approved, please return call.

## 2024-01-12 ENCOUNTER — TELEPHONE (OUTPATIENT)
Dept: GASTROENTEROLOGY | Facility: CLINIC | Age: 49
End: 2024-01-12
Payer: COMMERCIAL

## 2024-01-12 NOTE — TELEPHONE ENCOUNTER
Letter sent via my chart and printed for mail to discuss fast track option for colonoscopy screening    Please verify if any family history of colon polyps or colon cancer present as well as if Be Rutherford has undergone previous colonoscopy     SCANNED - COLONOSCOPY (08/06/2010 00:00) -diverticulosis

## 2024-01-19 DIAGNOSIS — Z12.11 ENCOUNTER FOR SCREENING FOR MALIGNANT NEOPLASM OF COLON: Primary | ICD-10-CM

## 2024-01-19 RX ORDER — SODIUM, POTASSIUM,MAG SULFATES 17.5-3.13G
177 SOLUTION, RECONSTITUTED, ORAL ORAL TAKE AS DIRECTED
Qty: 177 ML | Refills: 0 | Status: SHIPPED | OUTPATIENT
Start: 2024-01-19

## 2024-02-06 DIAGNOSIS — R39.12 BENIGN PROSTATIC HYPERPLASIA WITH WEAK URINARY STREAM: ICD-10-CM

## 2024-02-06 DIAGNOSIS — I10 HYPERTENSION, UNSPECIFIED TYPE: ICD-10-CM

## 2024-02-06 DIAGNOSIS — N40.1 BENIGN PROSTATIC HYPERPLASIA WITH WEAK URINARY STREAM: ICD-10-CM

## 2024-02-06 RX ORDER — TAMSULOSIN HYDROCHLORIDE 0.4 MG/1
0.4 CAPSULE ORAL DAILY
Qty: 90 CAPSULE | Refills: 3 | OUTPATIENT
Start: 2024-02-06

## 2024-02-07 RX ORDER — OLMESARTAN MEDOXOMIL 20 MG/1
TABLET ORAL
Qty: 90 TABLET | Refills: 3 | Status: SHIPPED | OUTPATIENT
Start: 2024-02-07

## 2024-02-07 NOTE — TELEPHONE ENCOUNTER
Tobin J Emerson called to request a refill on his medication.      Last office visit : 12/8/2023   Next office visit : Visit date not found     Requested Prescriptions     Pending Prescriptions Disp Refills    olmesartan (BENICAR) 20 MG tablet [Pharmacy Med Name: OLMESARTAN MEDOXOMIL TABS 20MG] 90 tablet 3     Sig: TAKE 1 TABLET DAILY            Joann Wall MA

## 2024-02-08 DIAGNOSIS — R39.12 BENIGN PROSTATIC HYPERPLASIA WITH WEAK URINARY STREAM: ICD-10-CM

## 2024-02-08 DIAGNOSIS — N40.1 BENIGN PROSTATIC HYPERPLASIA WITH WEAK URINARY STREAM: ICD-10-CM

## 2024-02-08 RX ORDER — TAMSULOSIN HYDROCHLORIDE 0.4 MG/1
0.4 CAPSULE ORAL DAILY
Qty: 90 CAPSULE | Refills: 3 | Status: SHIPPED | OUTPATIENT
Start: 2024-02-08

## 2024-02-08 NOTE — TELEPHONE ENCOUNTER
Patient called stating pharmacy contacted patient and his medication had been refused. Patient stated [facility].

## 2024-03-20 ENCOUNTER — HOSPITAL ENCOUNTER (OUTPATIENT)
Facility: HOSPITAL | Age: 49
Setting detail: HOSPITAL OUTPATIENT SURGERY
Discharge: HOME | End: 2024-03-20
Attending: INTERNAL MEDICINE | Admitting: INTERNAL MEDICINE
Payer: COMMERCIAL

## 2024-03-20 ENCOUNTER — ANESTHESIA EVENT (OUTPATIENT)
Dept: GASTROENTEROLOGY | Facility: HOSPITAL | Age: 49
End: 2024-03-20
Payer: COMMERCIAL

## 2024-03-20 ENCOUNTER — ANESTHESIA (OUTPATIENT)
Dept: GASTROENTEROLOGY | Facility: HOSPITAL | Age: 49
End: 2024-03-20
Payer: COMMERCIAL

## 2024-03-20 VITALS
DIASTOLIC BLOOD PRESSURE: 68 MMHG | RESPIRATION RATE: 16 BRPM | HEART RATE: 73 BPM | BODY MASS INDEX: 35.94 KG/M2 | SYSTOLIC BLOOD PRESSURE: 122 MMHG | TEMPERATURE: 96.6 F | WEIGHT: 280 LBS | OXYGEN SATURATION: 95 % | HEIGHT: 74 IN

## 2024-03-20 DIAGNOSIS — Z12.11 ENCOUNTER FOR SCREENING FOR MALIGNANT NEOPLASM OF COLON: ICD-10-CM

## 2024-03-20 PROCEDURE — 25010000002 PROPOFOL 10 MG/ML EMULSION: Performed by: NURSE ANESTHETIST, CERTIFIED REGISTERED

## 2024-03-20 PROCEDURE — 25810000003 SODIUM CHLORIDE 0.9 % SOLUTION: Performed by: ANESTHESIOLOGY

## 2024-03-20 PROCEDURE — 88305 TISSUE EXAM BY PATHOLOGIST: CPT | Performed by: INTERNAL MEDICINE

## 2024-03-20 PROCEDURE — 45385 COLONOSCOPY W/LESION REMOVAL: CPT | Performed by: INTERNAL MEDICINE

## 2024-03-20 RX ORDER — FLUOXETINE 10 MG/1
10 CAPSULE ORAL DAILY
COMMUNITY

## 2024-03-20 RX ORDER — SODIUM CHLORIDE 9 MG/ML
40 INJECTION, SOLUTION INTRAVENOUS AS NEEDED
Status: CANCELLED | OUTPATIENT
Start: 2024-03-20

## 2024-03-20 RX ORDER — SODIUM CHLORIDE 0.9 % (FLUSH) 0.9 %
10 SYRINGE (ML) INJECTION EVERY 12 HOURS SCHEDULED
Status: CANCELLED | OUTPATIENT
Start: 2024-03-20

## 2024-03-20 RX ORDER — PROPOFOL 10 MG/ML
VIAL (ML) INTRAVENOUS AS NEEDED
Status: DISCONTINUED | OUTPATIENT
Start: 2024-03-20 | End: 2024-03-20 | Stop reason: SURG

## 2024-03-20 RX ORDER — ESOMEPRAZOLE MAGNESIUM 40 MG/1
40 CAPSULE, DELAYED RELEASE ORAL
COMMUNITY

## 2024-03-20 RX ORDER — SODIUM CHLORIDE 0.9 % (FLUSH) 0.9 %
10 SYRINGE (ML) INJECTION AS NEEDED
Status: CANCELLED | OUTPATIENT
Start: 2024-03-20

## 2024-03-20 RX ORDER — SODIUM CHLORIDE 9 MG/ML
500 INJECTION, SOLUTION INTRAVENOUS CONTINUOUS PRN
Status: DISCONTINUED | OUTPATIENT
Start: 2024-03-20 | End: 2024-03-20 | Stop reason: HOSPADM

## 2024-03-20 RX ORDER — SODIUM CHLORIDE 0.9 % (FLUSH) 0.9 %
10 SYRINGE (ML) INJECTION AS NEEDED
Status: DISCONTINUED | OUTPATIENT
Start: 2024-03-20 | End: 2024-03-20 | Stop reason: HOSPADM

## 2024-03-20 RX ORDER — LIDOCAINE HYDROCHLORIDE 20 MG/ML
INJECTION, SOLUTION EPIDURAL; INFILTRATION; INTRACAUDAL; PERINEURAL AS NEEDED
Status: DISCONTINUED | OUTPATIENT
Start: 2024-03-20 | End: 2024-03-20 | Stop reason: SURG

## 2024-03-20 RX ORDER — SODIUM CHLORIDE 9 MG/ML
100 INJECTION, SOLUTION INTRAVENOUS CONTINUOUS
Status: CANCELLED | OUTPATIENT
Start: 2024-03-20

## 2024-03-20 RX ADMIN — LIDOCAINE HYDROCHLORIDE 50 MG: 20 INJECTION, SOLUTION EPIDURAL; INFILTRATION; INTRACAUDAL; PERINEURAL at 08:53

## 2024-03-20 RX ADMIN — SODIUM CHLORIDE 500 ML: 9 INJECTION, SOLUTION INTRAVENOUS at 08:00

## 2024-03-20 RX ADMIN — PROPOFOL INJECTABLE EMULSION 300 MG: 10 INJECTION, EMULSION INTRAVENOUS at 08:53

## 2024-03-20 NOTE — H&P
"Braydon Eugene  1969  [id number]  Patient Care Team:  Cele Dos Santos APRN as PCP - General (Internal Medicine)    Braydon Eugene is a pleasant 52 y.o. male who presents for evaluation of Hyperlipidemia (3 mo. f/u   doesn't feel the same no interested in doing things)    T  Chief Complaint   Patient presents with   • Hyperlipidemia     3 mo. f/u   doesn't feel the same no interested in doing things       HPI:   Braydon is here for routine 3 mo follow for ambien compliance.  He has a pertinent medical history of BPH, hyperlipidemia, and insomnia.     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  Family History   Problem Relation Age of Onset   • Cancer Mother         lung and ovarian      Social History     Tobacco Use   Smoking Status Never Smoker   Smokeless Tobacco Current User   • Types: Snuff     Allergies   Allergen Reactions   • Tetanus Toxoids Hives       Current Outpatient Medications:   •  atorvastatin (LIPITOR) 20 MG tablet, Take 1 tablet by mouth Daily., Disp: 90 tablet, Rfl: 11  •  doxazosin (CARDURA) 2 MG tablet, Take 1 tablet by mouth Daily., Disp: 90 tablet, Rfl: 1  •  levocetirizine (Xyzal) 5 MG tablet, Take 5 mg by mouth Every Evening., Disp: , Rfl:   •  multivitamin with minerals (MENS 50+ MULTI VITAMIN/MIN PO), Take 1 tablet by mouth Daily., Disp: , Rfl:   •  zolpidem (AMBIEN) 10 MG tablet, TAKE 1 TABLET BY MOUTH AT NIGHT AS NEEDED FOR SLEEP, Disp: 30 tablet, Rfl: 2    Review of Systems  /90 (BP Location: Left arm, Patient Position: Sitting, Cuff Size: Adult)   Pulse 76   Temp 97.8 °F (36.6 °C) (Temporal)   Ht 178 cm (70.08\")   Wt 107 kg (236 lb 9.6 oz)   SpO2 97%   BMI 33.87 kg/m²     Physical Exam  Vitals reviewed.   Constitutional:       Appearance: He is well-developed. " [facility]  Pre Procedure History & Physical    Chief Complaint:   Screening    Subjective     HPI:   Screening    Past Medical History:   Past Medical History:   Diagnosis Date    Anxiety     Asthma     Hyperlipidemia     Hypertension     Sleep apnea     C PAP       Past Surgical History:  Past Surgical History:   Procedure Laterality Date    APPENDECTOMY      CHOLECYSTECTOMY      COLONOSCOPY  08/06/2010    normal    ENDOSCOPY  01/02/2013    mild gastritis/esophagitis    ENDOSCOPY N/A 02/13/2018    Procedure: ESOPHAGOGASTRODUODENOSCOPY WITH ANESTHESIA;  Surgeon: Isaias Horvath DO;  Location: [facility] ENDOSCOPY;  Service:     KIDNEY STONE SURGERY      SHOULDER DEBRIDEMENT Right     TENOLYSIS Left        Family History:  Family History   Problem Relation Age of Onset    Elizabeth's esophagus Mother        Social History:   reports that he has quit smoking. He has never used smokeless tobacco. He reports current alcohol use. He reports that he does not use drugs.    Medications:   Prior to Admission medications    Medication Sig Start Date End Date Taking? Authorizing Provider   amLODIPine (NORVASC) 10 MG tablet Take 1 tablet by mouth Daily.   Yes Pierre John MD   atorvastatin (LIPITOR) 10 MG tablet Take 1 tablet by mouth Daily.   Yes ProviderPierre MD   esomeprazole (nexIUM) 40 MG capsule Take 1 capsule by mouth Every Morning Before Breakfast.   Yes ProviderPierre MD   FLUoxetine (PROzac) 10 MG capsule Take 1 capsule by mouth Daily.   Yes ProviderPierre MD   olmesartan (BENICAR) 20 MG tablet Take 1 tablet by mouth Daily. 2/15/23  Yes Pierre John MD   sodium-potassium-magnesium sulfates (Suprep Bowel Prep Kit) 17.5-3.13-1.6 GM/177ML solution oral solution Take 1 bottle by mouth Take As Directed. Take as directed 1/19/24  Yes Johnny Sun APRN   tamsulosin (FLOMAX) 0.4 MG capsule 24 hr capsule Take 1 capsule by mouth Daily. 2/15/23  Yes Dora    Pulmonary:      Effort: Pulmonary effort is normal.   Skin:     General: Skin is warm and dry.   Neurological:      Mental Status: He is alert.   Psychiatric:         Mood and Affect: Mood normal.         Behavior: Behavior normal.         Thought Content: Thought content normal.         Judgment: Judgment normal.         Procedures    Results Review:  None    PHQ-9 Total Score:      Assessment/Plan:  Diagnoses and all orders for this visit:    1. Primary insomnia (Primary)  Comments:  continue ambien nightly    2. Low energy  Comments:  check testosterone today  Orders:  -     Testosterone, Free, Total; Future       Patient Instructions   This patient is on a controlled substance which improves symptoms/quality of life and is aware of the risks, benefits and possible side-effects current treatment. The patient denies any medication side-effects at this time. A controlled substance agreement will be obtained or is currently on file. We reviewed required monitoring for controlled substances including but not limited to quarterly follow-up visits, annual depression screening, and urine drug screens to which the patient is agreeable. A CHARISSE report has been or shortly will be reviewed. There are no signs of deviation or misuse.         Plan of care reviewed with patient at the conclusion of today's visit. Education was provided regarding diagnosis, management and any prescribed or recommended OTC medications.  Patient verbalizes understanding of and agreement with management plan.    No follow-ups on file.    *Note that portions of this note were completed with a voice recognition program.  Efforts were made to edit the dictation but occasionally words are transcribed.    SHAYAN Chopra           "MD Pierre   ALPRAZolam (Xanax) 1 MG tablet 1 tab by mouth 30 minutes prior to vasectomy 6/2/23   Braulio Chaudhary MD   HYDROcodone-acetaminophen (NORCO) 5-325 MG per tablet Take 1 tablet by mouth Every 6 (Six) Hours As Needed for Severe Pain (postop pain). 6/2/23   Braulio Chaudhary MD   naproxen (Naprosyn) 500 MG tablet Take 1 tablet by mouth 2 (Two) Times a Day With Meals. 6/2/23   Braulio Chaudhary MD   tadalafil (Cialis) 20 MG tablet 1/2 to 1 tab by mouth 1 hour prior to intercourse 5/24/23   Braulio Chaudhary MD       Allergies:  Patient has no known allergies.    ROS:    General: Weight stable  Resp: No SOA  Cardiovascular: No CP    Objective     Blood pressure 151/91, pulse 102, temperature 96.6 °F (35.9 °C), temperature source Temporal, resp. rate 18, height 188 cm (74\"), weight 127 kg (280 lb), SpO2 95%.    Physical Exam   Constitutional: Pt is oriented to person, place, and in no distress.   HENT: Mouth/Throat: Oropharynx is clear.   Cardiovascular: Normal rate, regular rhythm.    Pulmonary/Chest: Effort normal. No respiratory distress. No  wheezes.   Abdominal: Soft. Non-distended.  Skin: Skin is warm and dry.   Psychiatric: Mood, memory, affect and judgment appear normal.     Assessment & Plan     Diagnosis:  Screening    Anticipated Surgical Procedure:  C-scope    The risks, benefits, and alternatives of this procedure have been discussed with the patient or the responsible party- the patient understands and agrees to proceed.        "

## 2024-03-20 NOTE — ANESTHESIA POSTPROCEDURE EVALUATION
Patient: Be Rutherford    Procedure Summary       Date: 03/20/24 Room / Location: [facility] ENDOSCOPY 4 / [facility] ENDOSCOPY    Anesthesia Start: 0851 Anesthesia Stop: 0906    Procedure: COLONOSCOPY WITH ANESTHESIA Diagnosis:       Encounter for screening for malignant neoplasm of colon      (Encounter for screening for malignant neoplasm of colon [Z12.11])    Surgeons: Isaias Horvath DO Provider: Mathew Gonzalez CRNA    Anesthesia Type: MAC ASA Status: 2            Anesthesia Type: MAC    Vitals  Vitals Value Taken Time   /69 03/20/24 0926   Temp     Pulse 85 03/20/24 0928   Resp 16 03/20/24 0925   SpO2 97 % 03/20/24 0928   Vitals shown include unfiled device data.        Post Anesthesia Care and Evaluation    Patient location during evaluation: PHASE II  Level of consciousness: awake  Pain management: adequate    Airway patency: patent  Anesthetic complications: No anesthetic complications  PONV Status: none  Cardiovascular status: acceptable  Respiratory status: acceptable  Hydration status: acceptable

## 2024-03-20 NOTE — ANESTHESIA PREPROCEDURE EVALUATION
Anesthesia Evaluation     Patient summary reviewed   no history of anesthetic complications:   NPO Solid Status: > 8 hours             Airway   Mallampati: II  TM distance: >3 FB  Neck ROM: full  Dental      Pulmonary    (+) ,sleep apnea on CPAP  Cardiovascular   Exercise tolerance: good (4-7 METS)    (+) hypertension, hyperlipidemia  (-) past MI, cardiac stents, CABG      Neuro/Psych- negative ROS  (-) seizures, CVA  GI/Hepatic/Renal/Endo    (+) obesity, hiatal hernia, GERD, hypothyroidism  (-) diabetes    Musculoskeletal     Abdominal   (+) obese   Substance History      OB/GYN          Other                          Anesthesia Plan    ASA 2     MAC     intravenous induction     Anesthetic plan, risks, benefits, and alternatives have been provided, discussed and informed consent has been obtained with: patient.

## 2024-03-21 ENCOUNTER — TELEPHONE (OUTPATIENT)
Dept: GASTROENTEROLOGY | Facility: CLINIC | Age: 49
End: 2024-03-21
Payer: COMMERCIAL

## 2024-03-22 LAB
LAB AP CASE REPORT: NORMAL
Lab: NORMAL
PATH REPORT.FINAL DX SPEC: NORMAL
PATH REPORT.GROSS SPEC: NORMAL

## 2024-04-19 DIAGNOSIS — F34.1 DYSTHYMIA: ICD-10-CM

## 2024-04-19 RX ORDER — BUPROPION HYDROCHLORIDE 300 MG/1
TABLET ORAL
Qty: 90 TABLET | Refills: 1 | Status: SHIPPED | OUTPATIENT
Start: 2024-04-19

## 2024-04-19 RX ORDER — ESOMEPRAZOLE MAGNESIUM 40 MG/1
CAPSULE, DELAYED RELEASE ORAL
Qty: 90 CAPSULE | Refills: 1 | Status: SHIPPED | OUTPATIENT
Start: 2024-04-19

## 2024-04-19 NOTE — TELEPHONE ENCOUNTER
PHARMACY called to request a refill on his medication.      Last office visit : 12/8/2023   Next office visit : Visit date not found     Requested Prescriptions     Pending Prescriptions Disp Refills    esomeprazole (NEXIUM) 40 MG delayed release capsule [Pharmacy Med Name: Esomeprazole Magnesium 40mg Delayed-Release Capsule] 90 capsule 1     Sig: Take 1 capsule by mouth every morning before breakfast.            Joann Wall MA, [facility]

## 2024-04-19 NOTE — TELEPHONE ENCOUNTER
Tobin J Emerson called to request a refill on his medication.      Last office visit : 12/8/2023   Next office visit : Visit date not found     Requested Prescriptions     Pending Prescriptions Disp Refills    buPROPion (WELLBUTRIN XL) 300 MG extended release tablet [Pharmacy Med Name: Bupropion Hydrochloride 300mg Extended-Release (XL) Tablet] 90 tablet 1     Sig: Take 1 tablet by mouth daily.            Em Antonio MA

## 2024-06-02 ENCOUNTER — PATIENT MESSAGE (OUTPATIENT)
Dept: FAMILY MEDICINE CLINIC | Age: 49
End: 2024-06-02

## 2024-06-04 DIAGNOSIS — N40.1 BENIGN PROSTATIC HYPERPLASIA WITH WEAK URINARY STREAM: ICD-10-CM

## 2024-06-04 DIAGNOSIS — E03.8 OTHER SPECIFIED HYPOTHYROIDISM: ICD-10-CM

## 2024-06-04 DIAGNOSIS — R39.12 BENIGN PROSTATIC HYPERPLASIA WITH WEAK URINARY STREAM: ICD-10-CM

## 2024-06-04 NOTE — TELEPHONE ENCOUNTER
From: Tobin Carbone  To: Sarah Zimmer  Sent: 6/2/2024 9:36 AM CDT  Subject: Refills to another pharmacy     Could you please send my prescriptions for 90 day levothyroxine, tamsulosin, and nexium to [facility]

## 2024-06-04 NOTE — TELEPHONE ENCOUNTER
PHARMACY called to request a refill on his medication.      Last office visit : 12/8/2023   Next office visit : 6/4/2024     Requested Prescriptions     Pending Prescriptions Disp Refills    levothyroxine (SYNTHROID) 50 MCG tablet [Pharmacy Med Name: L-THYROXINE (SYNTHROID) TABS 50MCG] 90 tablet 1     Sig: Take 1 tablet by mouth Daily Take with Levo 200mcg    levothyroxine (SYNTHROID) 200 MCG tablet [Pharmacy Med Name: L-THYROXINE (SYNTHROID) TABS 200MCG] 90 tablet 1     Sig: Take 1 tablet by mouth Daily Take with Levo 50 mcg    tamsulosin (FLOMAX) 0.4 MG capsule 90 capsule 3     Sig: Take 1 capsule by mouth daily    esomeprazole (NEXIUM) 40 MG delayed release capsule 90 capsule 3     Sig: Take 1 capsule by mouth every morning (before breakfast)            Joann Wlal MA, CCMA

## 2024-06-04 NOTE — TELEPHONE ENCOUNTER
Tobin J Emerson called to request a refill on his medication.      Last office visit : 12/8/2023   Next office visit : 6/4/2024     Requested Prescriptions     Pending Prescriptions Disp Refills    levothyroxine (SYNTHROID) 50 MCG tablet [Pharmacy Med Name: L-THYROXINE (SYNTHROID) TABS 50MCG] 90 tablet 1     Sig: Take 1 tablet by mouth Daily Take with Levo 200mcg    levothyroxine (SYNTHROID) 200 MCG tablet [Pharmacy Med Name: L-THYROXINE (SYNTHROID) TABS 200MCG] 90 tablet 1     Sig: Take 1 tablet by mouth Daily Take with Levo 50 mcg    tamsulosin (FLOMAX) 0.4 MG capsule 90 capsule 3     Sig: Take 1 capsule by mouth daily    esomeprazole (NEXIUM) 40 MG delayed release capsule 90 capsule 3     Sig: Take 1 capsule by mouth every morning (before breakfast)            Joann Wall, [facility]

## 2024-06-05 DIAGNOSIS — E55.9 VITAMIN D DEFICIENCY: ICD-10-CM

## 2024-06-05 DIAGNOSIS — Z00.00 ANNUAL PHYSICAL EXAM: ICD-10-CM

## 2024-06-05 DIAGNOSIS — R73.01 IFG (IMPAIRED FASTING GLUCOSE): ICD-10-CM

## 2024-06-05 DIAGNOSIS — I10 ESSENTIAL HYPERTENSION: ICD-10-CM

## 2024-06-05 DIAGNOSIS — E03.9 ACQUIRED HYPOTHYROIDISM: ICD-10-CM

## 2024-06-05 DIAGNOSIS — Z11.4 ENCOUNTER FOR SCREENING FOR HIV: ICD-10-CM

## 2024-06-05 DIAGNOSIS — Z11.59 NEED FOR HEPATITIS C SCREENING TEST: ICD-10-CM

## 2024-06-05 DIAGNOSIS — E78.2 MIXED HYPERLIPIDEMIA: ICD-10-CM

## 2024-06-05 LAB
25(OH)D3 SERPL-MCNC: 40.5 NG/ML
ALBUMIN SERPL-MCNC: 4.1 G/DL (ref 3.5–5.2)
ALP SERPL-CCNC: 75 U/L (ref 40–130)
ALT SERPL-CCNC: 19 U/L (ref 5–41)
ANION GAP SERPL CALCULATED.3IONS-SCNC: 13 MMOL/L (ref 7–19)
AST SERPL-CCNC: 16 U/L (ref 5–40)
BASOPHILS # BLD: 0.1 K/UL (ref 0–0.2)
BASOPHILS NFR BLD: 1.1 % (ref 0–1)
BILIRUB SERPL-MCNC: 0.5 MG/DL (ref 0.2–1.2)
BUN SERPL-MCNC: 8 MG/DL (ref 6–20)
CALCIUM SERPL-MCNC: 8.8 MG/DL (ref 8.6–10)
CHLORIDE SERPL-SCNC: 104 MMOL/L (ref 98–111)
CHOLEST SERPL-MCNC: 157 MG/DL (ref 160–199)
CO2 SERPL-SCNC: 23 MMOL/L (ref 22–29)
CREAT SERPL-MCNC: 0.8 MG/DL (ref 0.5–1.2)
EOSINOPHIL # BLD: 0.2 K/UL (ref 0–0.6)
EOSINOPHIL NFR BLD: 2.4 % (ref 0–5)
ERYTHROCYTE [DISTWIDTH] IN BLOOD BY AUTOMATED COUNT: 12.8 % (ref 11.5–14.5)
GLUCOSE SERPL-MCNC: 79 MG/DL (ref 74–109)
HBA1C MFR BLD: 5.5 % (ref 4–6)
HCT VFR BLD AUTO: 43.5 % (ref 42–52)
HCV AB SERPL QL IA: NORMAL
HDLC SERPL-MCNC: 45 MG/DL (ref 55–121)
HGB BLD-MCNC: 14.2 G/DL (ref 14–18)
HIV-1 P24 AG: NORMAL
HIV1+2 AB SERPLBLD QL IA.RAPID: NORMAL
IMM GRANULOCYTES # BLD: 0 K/UL
LDLC SERPL CALC-MCNC: 95 MG/DL
LYMPHOCYTES # BLD: 1.9 K/UL (ref 1.1–4.5)
LYMPHOCYTES NFR BLD: 30.7 % (ref 20–40)
MCH RBC QN AUTO: 30 PG (ref 27–31)
MCHC RBC AUTO-ENTMCNC: 32.6 G/DL (ref 33–37)
MCV RBC AUTO: 91.8 FL (ref 80–94)
MONOCYTES # BLD: 0.7 K/UL (ref 0–0.9)
MONOCYTES NFR BLD: 11.1 % (ref 0–10)
NEUTROPHILS # BLD: 3.3 K/UL (ref 1.5–7.5)
NEUTS SEG NFR BLD: 54.4 % (ref 50–65)
PLATELET # BLD AUTO: 286 K/UL (ref 130–400)
PMV BLD AUTO: 10.2 FL (ref 9.4–12.4)
POTASSIUM SERPL-SCNC: 4.3 MMOL/L (ref 3.5–5)
PROT SERPL-MCNC: 6.3 G/DL (ref 6.6–8.7)
RBC # BLD AUTO: 4.74 M/UL (ref 4.7–6.1)
SODIUM SERPL-SCNC: 140 MMOL/L (ref 136–145)
TRIGL SERPL-MCNC: 85 MG/DL (ref 0–149)
TSH SERPL DL<=0.005 MIU/L-ACNC: 1.83 UIU/ML (ref 0.27–4.2)
WBC # BLD AUTO: 6.2 K/UL (ref 4.8–10.8)

## 2024-06-05 RX ORDER — LEVOTHYROXINE SODIUM 0.05 MG/1
50 TABLET ORAL DAILY
Qty: 90 TABLET | Refills: 1 | Status: SHIPPED | OUTPATIENT
Start: 2024-06-05

## 2024-06-05 RX ORDER — LEVOTHYROXINE SODIUM 0.2 MG/1
200 TABLET ORAL DAILY
Qty: 90 TABLET | Refills: 1 | Status: SHIPPED | OUTPATIENT
Start: 2024-06-05

## 2024-06-05 RX ORDER — ESOMEPRAZOLE MAGNESIUM 40 MG/1
40 CAPSULE, DELAYED RELEASE ORAL
Qty: 90 CAPSULE | Refills: 3 | Status: SHIPPED | OUTPATIENT
Start: 2024-06-05

## 2024-06-05 RX ORDER — TAMSULOSIN HYDROCHLORIDE 0.4 MG/1
0.4 CAPSULE ORAL DAILY
Qty: 90 CAPSULE | Refills: 3 | Status: SHIPPED | OUTPATIENT
Start: 2024-06-05

## 2024-07-06 DIAGNOSIS — E03.8 OTHER SPECIFIED HYPOTHYROIDISM: ICD-10-CM

## 2024-07-08 RX ORDER — LEVOTHYROXINE SODIUM 0.05 MG/1
TABLET ORAL
Qty: 90 TABLET | Refills: 1 | Status: SHIPPED | OUTPATIENT
Start: 2024-07-08

## 2024-07-08 RX ORDER — LEVOTHYROXINE SODIUM 0.2 MG/1
TABLET ORAL
Qty: 90 TABLET | Refills: 1 | Status: SHIPPED | OUTPATIENT
Start: 2024-07-08

## 2024-07-08 NOTE — TELEPHONE ENCOUNTER
Tobin J Emerson called to request a refill on his medication.      Last office visit : 12/8/2023   Next office visit : Visit date not found     Requested Prescriptions     Pending Prescriptions Disp Refills    levothyroxine (SYNTHROID) 50 MCG tablet [Pharmacy Med Name: Levothyroxine Sodium 50mcg Tablet] 90 tablet 1     Sig: Take 1 tablet by mouth daily 30 minutes before breakfast.    levothyroxine (SYNTHROID) 200 MCG tablet [Pharmacy Med Name: Levothyroxine Sodium 200mcg Tablet] 90 tablet 1     Sig: Take 1 tablet by mouth daily 30 minutes before breakfast.            Petrona Bonilla LPN

## 2024-07-19 ENCOUNTER — OFFICE VISIT (OUTPATIENT)
Dept: PRIMARY CARE CLINIC | Age: 49
End: 2024-07-19
Payer: COMMERCIAL

## 2024-07-19 ENCOUNTER — HOSPITAL ENCOUNTER (OUTPATIENT)
Dept: GENERAL RADIOLOGY | Age: 49
Discharge: HOME | End: 2024-07-19
Payer: COMMERCIAL

## 2024-07-19 VITALS
RESPIRATION RATE: 16 BRPM | TEMPERATURE: 97.2 F | OXYGEN SATURATION: 98 % | WEIGHT: 288 LBS | DIASTOLIC BLOOD PRESSURE: 78 MMHG | HEART RATE: 80 BPM | SYSTOLIC BLOOD PRESSURE: 120 MMHG | BODY MASS INDEX: 36 KG/M2

## 2024-07-19 DIAGNOSIS — R06.02 SOB (SHORTNESS OF BREATH): ICD-10-CM

## 2024-07-19 DIAGNOSIS — R06.02 SOB (SHORTNESS OF BREATH): Primary | ICD-10-CM

## 2024-07-19 PROCEDURE — 99213 OFFICE O/P EST LOW 20 MIN: CPT | Performed by: NURSE PRACTITIONER

## 2024-07-19 PROCEDURE — 3074F SYST BP LT 130 MM HG: CPT | Performed by: NURSE PRACTITIONER

## 2024-07-19 PROCEDURE — 3078F DIAST BP <80 MM HG: CPT | Performed by: NURSE PRACTITIONER

## 2024-07-19 PROCEDURE — 71046 X-RAY EXAM CHEST 2 VIEWS: CPT

## 2024-07-19 RX ORDER — BENZONATATE 100 MG/1
100 CAPSULE ORAL 3 TIMES DAILY PRN
Qty: 30 CAPSULE | Refills: 0 | Status: SHIPPED | OUTPATIENT
Start: 2024-07-19 | End: 2024-07-29

## 2024-07-19 ASSESSMENT — ENCOUNTER SYMPTOMS
WHEEZING: 0
SORE THROAT: 0
SPUTUM PRODUCTION: 0
RHINORRHEA: 0
SWOLLEN GLANDS: 0
SHORTNESS OF BREATH: 1

## 2024-07-19 NOTE — PROGRESS NOTES
[facility]  [facility] [address]  [address]  [place] [address]  Dept: [phone]  Dept Fax: [phone]  Loc: [phone]     Tobin Carbone is a 49 y.o. male who presents today for his medical conditions/complaintsas noted below.  Tobin Carbone is c/o of Cough, Chest Congestion, and Shortness of Breath        HPI:     Shortness of Breath  This is a chronic problem. The current episode started more than 1 month ago. The problem occurs intermittently. The problem has been unchanged. Pertinent negatives include no chest pain, fever, headaches, rhinorrhea, sore throat, sputum production, swollen glands or wheezing. Nothing aggravates the symptoms. Associated symptoms comments: Cough  .   States he's had nonproductive cough so much and for so long that his chest muscle is sore. Denies any other symptoms.       Past Medical History:   Diagnosis Date    Adenoma of left adrenal gland     Hyperlipidemia     Hypertension     Hypothyroidism     Kidney stone     Multiple thyroid nodules 2020    Unspecified sleep apnea       Past Surgical History:   Procedure Laterality Date    APPENDECTOMY      CHOLECYSTECTOMY  2015    LITHOTRIPSY      SHOULDER ARTHROSCOPY Left 2022    LEFT SHOULDER SUBSCAPULARIS DEBRIDEMENT, BICEPS TENODESIS/TENOTOMY performed by Zeferino Lemos MD at [facility] OR    SHOULDER DEBRIDEMENT Right 10/2013    VASECTOMY      Dr. Solis    WRIST SURGERY Left        Family History   Problem Relation Age of Onset    Prostate Cancer Father     Coronary Art Dis Father     Asthma Mother     Diabetes Maternal Grandmother     Asthma Maternal Grandmother        Social History     Tobacco Use    Smoking status: Former     Current packs/day: 0.00     Average packs/day: 1.5 packs/day for 5.0 years (7.5 ttl pk-yrs)     Types: Cigarettes     Start date: 1994     Quit date: 1999     Years since quittin.1    Smokeless tobacco: Never   Substance Use Topics

## 2024-07-19 NOTE — PATIENT INSTRUCTIONS
Will call with results of chest xray for further treatment recommendations.  Take mucinex daily as directed.  Take zyrtec daily as directed.

## 2024-09-04 ENCOUNTER — OFFICE VISIT (OUTPATIENT)
Dept: FAMILY MEDICINE CLINIC | Age: 49
End: 2024-09-04
Payer: COMMERCIAL

## 2024-09-04 VITALS
SYSTOLIC BLOOD PRESSURE: 128 MMHG | DIASTOLIC BLOOD PRESSURE: 74 MMHG | OXYGEN SATURATION: 99 % | BODY MASS INDEX: 35.5 KG/M2 | TEMPERATURE: 97 F | HEART RATE: 73 BPM | WEIGHT: 284 LBS

## 2024-09-04 DIAGNOSIS — R07.89 ATYPICAL CHEST PAIN: ICD-10-CM

## 2024-09-04 DIAGNOSIS — J45.30 MILD PERSISTENT ASTHMA WITHOUT COMPLICATION: Primary | ICD-10-CM

## 2024-09-04 PROCEDURE — 3078F DIAST BP <80 MM HG: CPT | Performed by: NURSE PRACTITIONER

## 2024-09-04 PROCEDURE — 3074F SYST BP LT 130 MM HG: CPT | Performed by: NURSE PRACTITIONER

## 2024-09-04 PROCEDURE — 93000 ELECTROCARDIOGRAM COMPLETE: CPT | Performed by: NURSE PRACTITIONER

## 2024-09-04 PROCEDURE — 99214 OFFICE O/P EST MOD 30 MIN: CPT | Performed by: NURSE PRACTITIONER

## 2024-09-04 RX ORDER — ALBUTEROL SULFATE 90 UG/1
2 AEROSOL, METERED RESPIRATORY (INHALATION) 4 TIMES DAILY PRN
Qty: 18 G | Refills: 5 | Status: SHIPPED | OUTPATIENT
Start: 2024-09-04

## 2024-09-04 RX ORDER — ALBUTEROL SULFATE 90 UG/1
INHALANT RESPIRATORY (INHALATION)
COMMUNITY

## 2024-09-04 RX ORDER — MONTELUKAST SODIUM 10 MG/1
10 TABLET ORAL NIGHTLY
Qty: 30 TABLET | Refills: 5 | Status: SHIPPED | OUTPATIENT
Start: 2024-09-04

## 2024-09-04 SDOH — ECONOMIC STABILITY: INCOME INSECURITY: HOW HARD IS IT FOR YOU TO PAY FOR THE VERY BASICS LIKE FOOD, HOUSING, MEDICAL CARE, AND HEATING?: NOT HARD AT ALL

## 2024-09-04 SDOH — ECONOMIC STABILITY: FOOD INSECURITY: WITHIN THE PAST 12 MONTHS, YOU WORRIED THAT YOUR FOOD WOULD RUN OUT BEFORE YOU GOT MONEY TO BUY MORE.: NEVER TRUE

## 2024-09-04 SDOH — ECONOMIC STABILITY: FOOD INSECURITY: WITHIN THE PAST 12 MONTHS, THE FOOD YOU BOUGHT JUST DIDN'T LAST AND YOU DIDN'T HAVE MONEY TO GET MORE.: NEVER TRUE

## 2024-09-04 ASSESSMENT — PATIENT HEALTH QUESTIONNAIRE - PHQ9
SUM OF ALL RESPONSES TO PHQ QUESTIONS 1-9: 3
5. POOR APPETITE OR OVEREATING: NOT AT ALL
2. FEELING DOWN, DEPRESSED OR HOPELESS: SEVERAL DAYS
9. THOUGHTS THAT YOU WOULD BE BETTER OFF DEAD, OR OF HURTING YOURSELF: NOT AT ALL
SUM OF ALL RESPONSES TO PHQ QUESTIONS 1-9: 3
SUM OF ALL RESPONSES TO PHQ QUESTIONS 1-9: 3
4. FEELING TIRED OR HAVING LITTLE ENERGY: NOT AT ALL
1. LITTLE INTEREST OR PLEASURE IN DOING THINGS: NOT AT ALL
10. IF YOU CHECKED OFF ANY PROBLEMS, HOW DIFFICULT HAVE THESE PROBLEMS MADE IT FOR YOU TO DO YOUR WORK, TAKE CARE OF THINGS AT HOME, OR GET ALONG WITH OTHER PEOPLE: NOT DIFFICULT AT ALL
3. TROUBLE FALLING OR STAYING ASLEEP: NOT AT ALL
SUM OF ALL RESPONSES TO PHQ9 QUESTIONS 1 & 2: 1
8. MOVING OR SPEAKING SO SLOWLY THAT OTHER PEOPLE COULD HAVE NOTICED. OR THE OPPOSITE, BEING SO FIGETY OR RESTLESS THAT YOU HAVE BEEN MOVING AROUND A LOT MORE THAN USUAL: NOT AT ALL
7. TROUBLE CONCENTRATING ON THINGS, SUCH AS READING THE NEWSPAPER OR WATCHING TELEVISION: NOT AT ALL
6. FEELING BAD ABOUT YOURSELF - OR THAT YOU ARE A FAILURE OR HAVE LET YOURSELF OR YOUR FAMILY DOWN: MORE THAN HALF THE DAYS
SUM OF ALL RESPONSES TO PHQ QUESTIONS 1-9: 3

## 2024-09-04 ASSESSMENT — ENCOUNTER SYMPTOMS: SHORTNESS OF BREATH: 1

## 2024-09-04 NOTE — PROGRESS NOTES
SUBJECTIVE:    Patient ID: Tobin Carbone is a49 y.o. male.  Tobin Carbone is here today for Asthma (Patient presents today for his Asthma. He states it has worsened over this past year. Reports increased SOA. Patient was seen at walk in clinic over a month ago and chest XRAY was completed. He states it was WNL. He also reports episodes of chest pain at times, he states he doesn't feel like its heart related and hasn't had any episodes in weeks. Patient denies any chest discomfort today. Patient also has been exposed to Covid. Denies any symptoms. )  .    HPI:   HPI     Tobin is here today with concerns of his breathing.  Dx with exercise induced asthma years ago.  Was given albuterol inhaler and states that he has just realized his inhaler is .  Has had PFT with employer approx 8 yrs ago and has upcoming physical and has requested pulmonary function test and that will be in approximately 2 months.  I will hold off on ordering this test since he will be having one free with his employer.  He will bring to his next visit.  Some burning in chest and sometimes like pressure 'there and gone\"-points to mid chest/epigastric    EKG has been reviewed here today normal sinus rhythm with no acute change.    Past Medical History:   Diagnosis Date    Adenoma of left adrenal gland     Hyperlipidemia     Hypertension     Hypothyroidism     Kidney stone     Multiple thyroid nodules 2020    Unspecified sleep apnea      Prior to Visit Medications    Medication Sig Taking? Authorizing Provider   Albuterol Sulfate, sensor, (PROAIR DIGIHALER) 108 (90 Base) MCG/ACT AEPB Inhale into the lungs Yes Provider, MD Jesus Alberto   albuterol sulfate HFA (VENTOLIN HFA) 108 (90 Base) MCG/ACT inhaler Inhale 2 puffs into the lungs 4 times daily as needed for Wheezing Yes Sarah Zimmer APRN   montelukast (SINGULAIR) 10 MG tablet Take 1 tablet by mouth nightly Yes aSrah Zimmer APRN   mometasone-formoterol (DULERA) 100-5 MCG/ACT inhaler

## 2024-09-14 DIAGNOSIS — J45.30 MILD PERSISTENT ASTHMA WITHOUT COMPLICATION: ICD-10-CM

## 2024-09-16 RX ORDER — MONTELUKAST SODIUM 10 MG/1
TABLET ORAL
Refills: 0 | OUTPATIENT
Start: 2024-09-16

## 2024-09-16 RX ORDER — ALBUTEROL SULFATE 90 UG/1
INHALANT RESPIRATORY (INHALATION)
Refills: 0 | OUTPATIENT
Start: 2024-09-16

## 2024-10-02 DIAGNOSIS — J45.30 MILD PERSISTENT ASTHMA WITHOUT COMPLICATION: ICD-10-CM

## 2024-10-03 DIAGNOSIS — J45.30 MILD PERSISTENT ASTHMA WITHOUT COMPLICATION: ICD-10-CM

## 2024-10-03 RX ORDER — MONTELUKAST SODIUM 10 MG/1
10 TABLET ORAL NIGHTLY
Qty: 90 TABLET | Refills: 1 | Status: SHIPPED | OUTPATIENT
Start: 2024-10-03

## 2024-10-03 NOTE — TELEPHONE ENCOUNTER
Tobin J Emerson called to request a refill on his medication.      Last office visit : 9/4/2024   Next office visit : Visit date not found     Requested Prescriptions     Pending Prescriptions Disp Refills    mometasone-formoterol (DULERA) 100-5 MCG/ACT inhaler 3 each 2     Sig: Inhale 2 puffs into the lungs 2 times daily 13 GRAM INHALERS----3 DEVICES FOR 3 MONTHS.    montelukast (SINGULAIR) 10 MG tablet 90 tablet 1     Sig: Take 1 tablet by mouth nightly            Petrona Bonilla LPN

## 2024-10-10 ENCOUNTER — TELEPHONE (OUTPATIENT)
Dept: FAMILY MEDICINE CLINIC | Age: 49
End: 2024-10-10

## 2024-10-10 DIAGNOSIS — G47.30 SLEEP APNEA, UNSPECIFIED TYPE: Primary | ICD-10-CM

## 2024-11-13 DIAGNOSIS — I10 ESSENTIAL HYPERTENSION: ICD-10-CM

## 2024-11-13 DIAGNOSIS — E78.2 MIXED HYPERLIPIDEMIA: ICD-10-CM

## 2024-11-13 DIAGNOSIS — E55.9 VITAMIN D DEFICIENCY: ICD-10-CM

## 2024-11-13 DIAGNOSIS — R73.01 IFG (IMPAIRED FASTING GLUCOSE): Primary | ICD-10-CM

## 2024-12-04 DIAGNOSIS — E03.8 OTHER SPECIFIED HYPOTHYROIDISM: ICD-10-CM

## 2024-12-04 DIAGNOSIS — E55.9 VITAMIN D DEFICIENCY: ICD-10-CM

## 2024-12-04 DIAGNOSIS — I10 ESSENTIAL HYPERTENSION: ICD-10-CM

## 2024-12-04 DIAGNOSIS — F41.9 ANXIETY: ICD-10-CM

## 2024-12-04 DIAGNOSIS — E78.2 MIXED HYPERLIPIDEMIA: ICD-10-CM

## 2024-12-04 RX ORDER — LEVOTHYROXINE SODIUM 200 UG/1
TABLET ORAL
Qty: 90 TABLET | Refills: 1 | Status: SHIPPED | OUTPATIENT
Start: 2024-12-04

## 2024-12-04 RX ORDER — AMLODIPINE BESYLATE 5 MG/1
TABLET ORAL
Qty: 90 TABLET | Refills: 1 | Status: SHIPPED | OUTPATIENT
Start: 2024-12-04

## 2024-12-04 RX ORDER — LEVOTHYROXINE SODIUM 50 UG/1
TABLET ORAL
Qty: 90 TABLET | Refills: 1 | Status: SHIPPED | OUTPATIENT
Start: 2024-12-04

## 2024-12-04 RX ORDER — FLUOXETINE 10 MG/1
CAPSULE ORAL
Qty: 90 CAPSULE | Refills: 1 | Status: SHIPPED | OUTPATIENT
Start: 2024-12-04

## 2024-12-04 RX ORDER — ERGOCALCIFEROL 1.25 MG/1
CAPSULE, LIQUID FILLED ORAL
Qty: 12 CAPSULE | Refills: 1 | Status: SHIPPED | OUTPATIENT
Start: 2024-12-04

## 2024-12-04 RX ORDER — ATORVASTATIN CALCIUM 40 MG/1
TABLET, FILM COATED ORAL
Qty: 90 TABLET | Refills: 1 | Status: SHIPPED | OUTPATIENT
Start: 2024-12-04

## 2024-12-04 NOTE — TELEPHONE ENCOUNTER
Tobin J Emerson called to request a refill on his medication.      Last office visit : 9/4/2024   Next office visit : 12/9/2024     Requested Prescriptions     Pending Prescriptions Disp Refills    FLUoxetine (PROZAC) 10 MG capsule [Pharmacy Med Name: FLUOXETINE HCL CAPS 10MG] 90 capsule 3     Sig: TAKE 1 CAPSULE DAILY    levothyroxine (SYNTHROID) 50 MCG tablet [Pharmacy Med Name: L-THYROXINE (SYNTHROID) TABS 50MCG] 90 tablet 3     Sig: TAKE 1 TABLET DAILY, TAKE WITH LEVOTHYROXINE 200 MCG    atorvastatin (LIPITOR) 40 MG tablet [Pharmacy Med Name: ATORVASTATIN TABS 40MG] 90 tablet 3     Sig: TAKE 1 TABLET NIGHTLY FOR CHOLESTEROL    levothyroxine (SYNTHROID) 200 MCG tablet [Pharmacy Med Name: L-THYROXINE (SYNTHROID) TABS 200MCG] 90 tablet 3     Sig: TAKE 1 TABLET DAILY, TAKE WITH LEVOTHYROXINE 50 MCG    vitamin D (ERGOCALCIFEROL) 1.25 MG (71928 UT) CAPS capsule [Pharmacy Med Name: VIT D-2 CAP(ERGOCAL)1.25MG 50,000U] 12 capsule 3     Sig: TAKE 1 CAPSULE ONCE WEEKLY    amLODIPine (NORVASC) 5 MG tablet [Pharmacy Med Name: AMLODIPINE BESYLATE TABS 5MG] 90 tablet 3     Sig: TAKE 1 TABLET DAILY            Petrona Bonilla LPN

## 2024-12-05 DIAGNOSIS — I10 ESSENTIAL HYPERTENSION: ICD-10-CM

## 2024-12-05 DIAGNOSIS — R39.12 BENIGN PROSTATIC HYPERPLASIA WITH WEAK URINARY STREAM: ICD-10-CM

## 2024-12-05 DIAGNOSIS — R73.01 IFG (IMPAIRED FASTING GLUCOSE): ICD-10-CM

## 2024-12-05 DIAGNOSIS — E55.9 VITAMIN D DEFICIENCY: ICD-10-CM

## 2024-12-05 DIAGNOSIS — E78.2 MIXED HYPERLIPIDEMIA: ICD-10-CM

## 2024-12-05 DIAGNOSIS — N40.1 BENIGN PROSTATIC HYPERPLASIA WITH WEAK URINARY STREAM: ICD-10-CM

## 2024-12-05 LAB
25(OH)D3 SERPL-MCNC: 42.8 NG/ML
ALBUMIN SERPL-MCNC: 4.2 G/DL (ref 3.5–5.2)
ALP SERPL-CCNC: 79 U/L (ref 40–129)
ALT SERPL-CCNC: 23 U/L (ref 5–41)
ANION GAP SERPL CALCULATED.3IONS-SCNC: 11 MMOL/L (ref 7–19)
AST SERPL-CCNC: 24 U/L (ref 5–40)
BASOPHILS # BLD: 0.1 K/UL (ref 0–0.2)
BASOPHILS NFR BLD: 0.9 % (ref 0–1)
BILIRUB SERPL-MCNC: 0.3 MG/DL (ref 0.2–1.2)
BILIRUB UR QL STRIP: NEGATIVE
BUN SERPL-MCNC: 11 MG/DL (ref 6–20)
CALCIUM SERPL-MCNC: 8.9 MG/DL (ref 8.6–10)
CHLORIDE SERPL-SCNC: 101 MMOL/L (ref 98–111)
CHOLEST SERPL-MCNC: 141 MG/DL (ref 0–199)
CLARITY UR: CLEAR
CO2 SERPL-SCNC: 27 MMOL/L (ref 22–29)
COLOR UR: YELLOW
CREAT SERPL-MCNC: 0.9 MG/DL (ref 0.7–1.2)
EOSINOPHIL # BLD: 0.2 K/UL (ref 0–0.6)
EOSINOPHIL NFR BLD: 2.6 % (ref 0–5)
ERYTHROCYTE [DISTWIDTH] IN BLOOD BY AUTOMATED COUNT: 12.8 % (ref 11.5–14.5)
GLUCOSE SERPL-MCNC: 91 MG/DL (ref 70–99)
GLUCOSE UR STRIP.AUTO-MCNC: NEGATIVE MG/DL
HBA1C MFR BLD: 5.8 % (ref 4–5.6)
HCT VFR BLD AUTO: 42.7 % (ref 42–52)
HDLC SERPL-MCNC: 45 MG/DL (ref 40–60)
HGB BLD-MCNC: 13.6 G/DL (ref 14–18)
HGB UR STRIP.AUTO-MCNC: NEGATIVE MG/L
IMM GRANULOCYTES # BLD: 0 K/UL
KETONES UR STRIP.AUTO-MCNC: NEGATIVE MG/DL
LDLC SERPL CALC-MCNC: 81 MG/DL
LEUKOCYTE ESTERASE UR QL STRIP.AUTO: NEGATIVE
LYMPHOCYTES # BLD: 2.7 K/UL (ref 1.1–4.5)
LYMPHOCYTES NFR BLD: 34.7 % (ref 20–40)
MCH RBC QN AUTO: 30.6 PG (ref 27–31)
MCHC RBC AUTO-ENTMCNC: 31.9 G/DL (ref 33–37)
MCV RBC AUTO: 96 FL (ref 80–94)
MONOCYTES # BLD: 1.1 K/UL (ref 0–0.9)
MONOCYTES NFR BLD: 13.9 % (ref 0–10)
NEUTROPHILS # BLD: 3.7 K/UL (ref 1.5–7.5)
NEUTS SEG NFR BLD: 47.6 % (ref 50–65)
NITRITE UR QL STRIP.AUTO: NEGATIVE
PH UR STRIP.AUTO: 6 [PH] (ref 5–8)
PLATELET # BLD AUTO: 295 K/UL (ref 130–400)
PMV BLD AUTO: 10 FL (ref 9.4–12.4)
POTASSIUM SERPL-SCNC: 4.2 MMOL/L (ref 3.5–5)
PROT SERPL-MCNC: 6.4 G/DL (ref 6.4–8.3)
PROT UR STRIP.AUTO-MCNC: NEGATIVE MG/DL
PSA SERPL-MCNC: 2.36 NG/ML (ref 0–4)
RBC # BLD AUTO: 4.45 M/UL (ref 4.7–6.1)
SODIUM SERPL-SCNC: 139 MMOL/L (ref 136–145)
SP GR UR STRIP.AUTO: 1.01 (ref 1–1.03)
TRIGL SERPL-MCNC: 76 MG/DL (ref 0–149)
TSH SERPL DL<=0.005 MIU/L-ACNC: 4.11 UIU/ML (ref 0.27–4.2)
UROBILINOGEN UR STRIP.AUTO-MCNC: 0.2 E.U./DL
WBC # BLD AUTO: 7.7 K/UL (ref 4.8–10.8)

## 2024-12-09 ENCOUNTER — OFFICE VISIT (OUTPATIENT)
Dept: FAMILY MEDICINE CLINIC | Age: 49
End: 2024-12-09
Payer: COMMERCIAL

## 2024-12-09 VITALS
OXYGEN SATURATION: 97 % | HEART RATE: 62 BPM | HEIGHT: 75 IN | TEMPERATURE: 97.1 F | BODY MASS INDEX: 34.83 KG/M2 | DIASTOLIC BLOOD PRESSURE: 76 MMHG | WEIGHT: 280.13 LBS | SYSTOLIC BLOOD PRESSURE: 134 MMHG

## 2024-12-09 DIAGNOSIS — Z00.00 ANNUAL PHYSICAL EXAM: Primary | ICD-10-CM

## 2024-12-09 DIAGNOSIS — I10 HYPERTENSION, UNSPECIFIED TYPE: ICD-10-CM

## 2024-12-09 DIAGNOSIS — E03.9 ACQUIRED HYPOTHYROIDISM: ICD-10-CM

## 2024-12-09 DIAGNOSIS — F34.1 DYSTHYMIA: ICD-10-CM

## 2024-12-09 DIAGNOSIS — E78.2 MIXED HYPERLIPIDEMIA: ICD-10-CM

## 2024-12-09 DIAGNOSIS — J45.30 MILD PERSISTENT ASTHMA WITHOUT COMPLICATION: ICD-10-CM

## 2024-12-09 PROCEDURE — 3078F DIAST BP <80 MM HG: CPT | Performed by: NURSE PRACTITIONER

## 2024-12-09 PROCEDURE — 99396 PREV VISIT EST AGE 40-64: CPT | Performed by: NURSE PRACTITIONER

## 2024-12-09 PROCEDURE — 3075F SYST BP GE 130 - 139MM HG: CPT | Performed by: NURSE PRACTITIONER

## 2024-12-09 RX ORDER — OLMESARTAN MEDOXOMIL 20 MG/1
TABLET ORAL
Qty: 90 TABLET | Refills: 3 | Status: SHIPPED | OUTPATIENT
Start: 2024-12-09

## 2024-12-09 RX ORDER — BUPROPION HYDROCHLORIDE 300 MG/1
TABLET ORAL
Qty: 90 TABLET | Refills: 1 | Status: SHIPPED | OUTPATIENT
Start: 2024-12-09

## 2024-12-09 RX ORDER — LEVOTHYROXINE SODIUM 75 UG/1
75 TABLET ORAL
Qty: 90 TABLET | Refills: 1 | Status: SHIPPED | OUTPATIENT
Start: 2024-12-09

## 2024-12-09 ASSESSMENT — ENCOUNTER SYMPTOMS
GASTROINTESTINAL NEGATIVE: 1
RESPIRATORY NEGATIVE: 1

## 2024-12-09 NOTE — PROGRESS NOTES
SUBJECTIVE:    Patient ID: Tobin Carbone is a49 y.o. male.  Tobin Carbone is here today for Annual Exam (Pt is here for yearly physical )  .    HPI:   HPI     Tobin is here today for his annual physical.  He has had lab work for review.  Feels his breathing is doing \"really well\" after starting Dulera.  He could tell improvement within a short time. Has only used rescue inhaler one time since starting dulera.  He also contributes starting Singulair as being helpful to his pulmonary improvement.  Sees urology next week.  PSA was ordered with urology and 2.36.   He continues to have anxiety and depression but feels that his mental health medications are working well at this time stating that he continues to do okay but feels this is something he will continue to freitas.  Hypothyroidism-lab work is showing a need to increase medication as TSH has increased into the 4 range.  Hypertension-blood pressure is well-controlled today.  He continues to adhere to daily medication treatment plan.  Hyperlipidemia-adheres to statin treatment and lab work was great.  Vitamin D deficiency-does adhere to weekly prescription vitamin D.  GERD-controlled with PPI.    Past Medical History:   Diagnosis Date    Adenoma of left adrenal gland     Hyperlipidemia     Hypertension     Hypothyroidism     Kidney stone     Multiple thyroid nodules 06/2020    Unspecified sleep apnea      Prior to Visit Medications    Medication Sig Taking? Authorizing Provider   mometasone-formoterol (DULERA) 100-5 MCG/ACT inhaler Inhale 2 puffs into the lungs 2 times daily 13 GRAM INHALERS----3 DEVICES FOR 3 MONTHS. Yes Sarah Zimmer APRN   levothyroxine (SYNTHROID) 75 MCG tablet Take 1 tablet by mouth every morning (before breakfast) Yes Sarah Zimmer APRN   buPROPion (WELLBUTRIN XL) 300 MG extended release tablet Take 1 tablet by mouth daily. Yes Sarah Zimmer APRN   olmesartan (BENICAR) 20 MG tablet TAKE 1 TABLET DAILY Yes Sarah Zimmer APRN   FLUoxetine

## 2024-12-13 ENCOUNTER — OFFICE VISIT (OUTPATIENT)
Dept: UROLOGY | Age: 49
End: 2024-12-13

## 2024-12-13 VITALS — WEIGHT: 281 LBS | HEIGHT: 75 IN | TEMPERATURE: 97.5 F | BODY MASS INDEX: 34.94 KG/M2

## 2024-12-13 DIAGNOSIS — N40.1 BENIGN PROSTATIC HYPERPLASIA WITH WEAK URINARY STREAM: Primary | ICD-10-CM

## 2024-12-13 DIAGNOSIS — R39.12 BENIGN PROSTATIC HYPERPLASIA WITH WEAK URINARY STREAM: Primary | ICD-10-CM

## 2024-12-13 LAB
APPEARANCE FLUID: CLEAR
BILIRUBIN, POC: NORMAL
BLOOD URINE, POC: NORMAL
CLARITY, POC: CLEAR
COLOR, POC: YELLOW
GLUCOSE URINE, POC: NORMAL MG/DL
KETONES, POC: NORMAL MG/DL
LEUKOCYTE EST, POC: NORMAL
NITRITE, POC: NORMAL
PH, POC: 7
PROTEIN, POC: NORMAL MG/DL
SPECIFIC GRAVITY, POC: 1.02
UROBILINOGEN, POC: 1 MG/DL

## 2024-12-13 RX ORDER — TAMSULOSIN HYDROCHLORIDE 0.4 MG/1
0.4 CAPSULE ORAL DAILY
Qty: 90 CAPSULE | Refills: 3 | Status: SHIPPED | OUTPATIENT
Start: 2024-12-13

## 2024-12-13 ASSESSMENT — ENCOUNTER SYMPTOMS
TROUBLE SWALLOWING: 0
BACK PAIN: 0
ABDOMINAL DISTENTION: 0
COUGH: 0
EYE DISCHARGE: 0
ABDOMINAL PAIN: 0
NAUSEA: 0
VOMITING: 0
SHORTNESS OF BREATH: 0
EYE REDNESS: 0
DIARRHEA: 0
CONSTIPATION: 0

## 2024-12-13 NOTE — PROGRESS NOTES
Tobin Carbone is a 49 y.o. male who presents today   Chief Complaint   Patient presents with    Follow-up     I am here today for my yearly follow up. My PSA is done.      BPH / LUTS:  Patient is here today for lower urinary tract symptoms which started a few year(s) ago.   Recently the urinary symptoms are: show no change  Current medical Rx for BPH/LUTS: Tamsulosin  Previous treatments tried for LUTS: Medical therapy with alpha-blocker  Previous surgical intervention: none  Urinary retention: No PVR today 0  Any associated gross hematuria? no  History of recurrent UTIs: no  His AUA Symptom Score is, 7/35 quality-of-life score is 1 pleased  Patient states symptoms are of mild severity.  Patient has a positive family history of prostate cancer his father was diagnosed in his 70s.  Patient had a single episode elevated PSA of 3.8 in January 2022 all his other PSAs have been within normal range.  Last year was 1.8 this year 2.36 slightly up but still normal for his age      Past Medical History:   Diagnosis Date    Adenoma of left adrenal gland     Hyperlipidemia     Hypertension     Hypothyroidism     Kidney stone     Multiple thyroid nodules 06/2020    Unspecified sleep apnea        Past Surgical History:   Procedure Laterality Date    APPENDECTOMY      CHOLECYSTECTOMY  2015    LITHOTRIPSY      SHOULDER ARTHROSCOPY Left 12/29/2022    LEFT SHOULDER SUBSCAPULARIS DEBRIDEMENT, BICEPS TENODESIS/TENOTOMY performed by Zeferino Lemos MD at [facility] OR    SHOULDER DEBRIDEMENT Right 10/2013    VASECTOMY  2023    Dr. Solis    WRIST SURGERY Left        Current Outpatient Medications   Medication Sig Dispense Refill    tamsulosin (FLOMAX) 0.4 MG capsule Take 1 capsule by mouth daily 90 capsule 3    mometasone-formoterol (DULERA) 100-5 MCG/ACT inhaler Inhale 2 puffs into the lungs 2 times daily 13 GRAM INHALERS----3 DEVICES FOR 3 MONTHS. 3 each 5    levothyroxine (SYNTHROID) 75 MCG tablet Take 1 tablet by mouth every morning

## 2025-02-04 DIAGNOSIS — J45.30 MILD PERSISTENT ASTHMA WITHOUT COMPLICATION: ICD-10-CM

## 2025-02-05 RX ORDER — ALBUTEROL SULFATE 90 UG/1
2 INHALANT RESPIRATORY (INHALATION) 4 TIMES DAILY PRN
Qty: 18 G | Refills: 5 | Status: SHIPPED | OUTPATIENT
Start: 2025-02-05

## 2025-02-05 NOTE — TELEPHONE ENCOUNTER
Tobin J Emerson called to request a refill on his medication.      Last office visit : 12/9/2024   Next office visit : Visit date not found     Requested Prescriptions     Pending Prescriptions Disp Refills    albuterol sulfate HFA (VENTOLIN HFA) 108 (90 Base) MCG/ACT inhaler 18 g 5     Sig: Inhale 2 puffs into the lungs 4 times daily as needed for Wheezing            Petrona Bonilla LPN

## 2025-04-05 DIAGNOSIS — I10 HYPERTENSION, UNSPECIFIED TYPE: ICD-10-CM

## 2025-04-05 DIAGNOSIS — E03.8 OTHER SPECIFIED HYPOTHYROIDISM: ICD-10-CM

## 2025-04-07 RX ORDER — LEVOTHYROXINE SODIUM 200 UG/1
200 TABLET ORAL DAILY
Qty: 90 TABLET | Refills: 3 | Status: SHIPPED | OUTPATIENT
Start: 2025-04-07

## 2025-04-07 RX ORDER — LEVOTHYROXINE SODIUM 75 UG/1
75 TABLET ORAL
Qty: 90 TABLET | Refills: 1 | OUTPATIENT
Start: 2025-04-07

## 2025-04-07 RX ORDER — LEVOTHYROXINE SODIUM 75 UG/1
75 TABLET ORAL
Qty: 90 TABLET | Refills: 1 | Status: SHIPPED | OUTPATIENT
Start: 2025-04-07

## 2025-04-07 RX ORDER — OLMESARTAN MEDOXOMIL 20 MG/1
TABLET ORAL
Qty: 90 TABLET | Refills: 3 | Status: SHIPPED | OUTPATIENT
Start: 2025-04-07

## 2025-04-07 NOTE — TELEPHONE ENCOUNTER
Tobin J Emerson called to request a refill on his medication.      Last office visit : 12/9/2024  Next office visit : Visit date not found     Requested Prescriptions      No prescriptions requested or ordered in this encounter            Petrona Bonilla LPN

## 2025-04-07 NOTE — TELEPHONE ENCOUNTER
Received fax from pharmacy requesting refill on pts medication(s). Pt was last seen in office on Visit date not found  and has a follow up scheduled for Visit date not found. Will send request to  Dr. Otto\"Sarah  for authorization.     Requested Prescriptions     Pending Prescriptions Disp Refills    levothyroxine (SYNTHROID) 200 MCG tablet 90 tablet 3     Sig: Take 1 tablet by mouth Daily

## 2025-04-07 NOTE — TELEPHONE ENCOUNTER
Tobin J Emerson called to request a refill on his medication.      Last office visit : 12/9/2024  Next office visit : Visit date not found     Requested Prescriptions     Pending Prescriptions Disp Refills    levothyroxine (SYNTHROID) 75 MCG tablet 90 tablet 1     Sig: Take 1 tablet by mouth every morning (before breakfast)            Petrona Bonilla LPN

## 2025-04-21 DIAGNOSIS — J45.30 MILD PERSISTENT ASTHMA WITHOUT COMPLICATION: ICD-10-CM

## 2025-04-21 RX ORDER — MONTELUKAST SODIUM 10 MG/1
10 TABLET ORAL NIGHTLY
Qty: 90 TABLET | Refills: 1 | Status: SHIPPED | OUTPATIENT
Start: 2025-04-21

## 2025-04-21 NOTE — TELEPHONE ENCOUNTER
Tobin J Emerson called to request a refill on his medication.      Last office visit : 12/09/2024  Next office visit : Visit date not found     Requested Prescriptions     Pending Prescriptions Disp Refills    montelukast (SINGULAIR) 10 MG tablet 90 tablet 1     Sig: Take 1 tablet by mouth nightly            Em Kirk CMA

## 2025-05-13 DIAGNOSIS — J45.30 MILD PERSISTENT ASTHMA WITHOUT COMPLICATION: ICD-10-CM

## 2025-05-13 NOTE — TELEPHONE ENCOUNTER
Pharmacy requests a refill on Tobin Carbone's medication.    Last Office Visit: 12/9/24  Next Office Visit:      Requested Prescriptions     Pending Prescriptions Disp Refills    montelukast (SINGULAIR) 10 MG tablet 90 tablet 1     Sig: Take 1 tablet by mouth nightly       Doreen Garcia LPN

## 2025-05-14 RX ORDER — MONTELUKAST SODIUM 10 MG/1
10 TABLET ORAL NIGHTLY
Qty: 90 TABLET | Refills: 0 | Status: SHIPPED | OUTPATIENT
Start: 2025-05-14

## 2025-05-28 DIAGNOSIS — E03.9 ACQUIRED HYPOTHYROIDISM: ICD-10-CM

## 2025-06-09 ENCOUNTER — OFFICE VISIT (OUTPATIENT)
Age: 50
End: 2025-06-09
Payer: COMMERCIAL

## 2025-06-09 VITALS
RESPIRATION RATE: 14 BRPM | TEMPERATURE: 97.6 F | HEIGHT: 75 IN | OXYGEN SATURATION: 96 % | SYSTOLIC BLOOD PRESSURE: 122 MMHG | HEART RATE: 65 BPM | BODY MASS INDEX: 35.84 KG/M2 | DIASTOLIC BLOOD PRESSURE: 74 MMHG | WEIGHT: 288.2 LBS

## 2025-06-09 DIAGNOSIS — I10 ESSENTIAL HYPERTENSION: ICD-10-CM

## 2025-06-09 DIAGNOSIS — E03.9 ACQUIRED HYPOTHYROIDISM: Primary | ICD-10-CM

## 2025-06-09 DIAGNOSIS — J45.30 MILD PERSISTENT ASTHMA WITHOUT COMPLICATION: ICD-10-CM

## 2025-06-09 DIAGNOSIS — F34.1 DYSTHYMIA: ICD-10-CM

## 2025-06-09 DIAGNOSIS — F41.9 ANXIETY: ICD-10-CM

## 2025-06-09 DIAGNOSIS — R73.01 IFG (IMPAIRED FASTING GLUCOSE): ICD-10-CM

## 2025-06-09 DIAGNOSIS — E03.9 ACQUIRED HYPOTHYROIDISM: ICD-10-CM

## 2025-06-09 DIAGNOSIS — E78.2 MIXED HYPERLIPIDEMIA: ICD-10-CM

## 2025-06-09 DIAGNOSIS — G47.30 SLEEP APNEA, UNSPECIFIED TYPE: ICD-10-CM

## 2025-06-09 DIAGNOSIS — E55.9 VITAMIN D DEFICIENCY: ICD-10-CM

## 2025-06-09 LAB
25(OH)D3 SERPL-MCNC: 38.3 NG/ML
ALBUMIN SERPL-MCNC: 4.3 G/DL (ref 3.5–5.2)
ALP SERPL-CCNC: 78 U/L (ref 40–129)
ALT SERPL-CCNC: 47 U/L (ref 10–50)
ANION GAP SERPL CALCULATED.3IONS-SCNC: 13 MMOL/L (ref 8–16)
AST SERPL-CCNC: 30 U/L (ref 10–50)
BASOPHILS # BLD: 0.1 K/UL (ref 0–0.2)
BASOPHILS NFR BLD: 0.9 % (ref 0–1)
BILIRUB SERPL-MCNC: 0.5 MG/DL (ref 0.2–1.2)
BUN SERPL-MCNC: 13 MG/DL (ref 6–20)
CALCIUM SERPL-MCNC: 9 MG/DL (ref 8.6–10)
CHLORIDE SERPL-SCNC: 103 MMOL/L (ref 98–107)
CO2 SERPL-SCNC: 22 MMOL/L (ref 22–29)
CREAT SERPL-MCNC: 0.9 MG/DL (ref 0.7–1.2)
EOSINOPHIL # BLD: 0.3 K/UL (ref 0–0.6)
EOSINOPHIL NFR BLD: 4 % (ref 0–5)
ERYTHROCYTE [DISTWIDTH] IN BLOOD BY AUTOMATED COUNT: 12.9 % (ref 11.5–14.5)
GLUCOSE SERPL-MCNC: 112 MG/DL (ref 70–99)
HBA1C MFR BLD: 5.9 % (ref 4–5.6)
HCT VFR BLD AUTO: 42.4 % (ref 42–52)
HGB BLD-MCNC: 15 G/DL (ref 14–18)
IMM GRANULOCYTES # BLD: 0 K/UL
LYMPHOCYTES # BLD: 1.8 K/UL (ref 1.1–4.5)
LYMPHOCYTES NFR BLD: 28.6 % (ref 20–40)
MCH RBC QN AUTO: 31.8 PG (ref 27–31)
MCHC RBC AUTO-ENTMCNC: 35.4 G/DL (ref 33–37)
MCV RBC AUTO: 90 FL (ref 80–94)
MONOCYTES # BLD: 0.9 K/UL (ref 0–0.9)
MONOCYTES NFR BLD: 13.6 % (ref 0–10)
NEUTROPHILS # BLD: 3.3 K/UL (ref 1.5–7.5)
NEUTS SEG NFR BLD: 52.7 % (ref 50–65)
PLATELET # BLD AUTO: 304 K/UL (ref 130–400)
PMV BLD AUTO: 9.9 FL (ref 9.4–12.4)
POTASSIUM SERPL-SCNC: 4.1 MMOL/L (ref 3.5–5.1)
PROT SERPL-MCNC: 6.7 G/DL (ref 6.4–8.3)
RBC # BLD AUTO: 4.71 M/UL (ref 4.7–6.1)
SODIUM SERPL-SCNC: 138 MMOL/L (ref 136–145)
WBC # BLD AUTO: 6.3 K/UL (ref 4.8–10.8)

## 2025-06-09 PROCEDURE — 3078F DIAST BP <80 MM HG: CPT | Performed by: NURSE PRACTITIONER

## 2025-06-09 PROCEDURE — 3074F SYST BP LT 130 MM HG: CPT | Performed by: NURSE PRACTITIONER

## 2025-06-09 PROCEDURE — 99214 OFFICE O/P EST MOD 30 MIN: CPT | Performed by: NURSE PRACTITIONER

## 2025-06-09 RX ORDER — BUPROPION HYDROCHLORIDE 300 MG/1
TABLET ORAL
Qty: 90 TABLET | Refills: 1 | Status: SHIPPED | OUTPATIENT
Start: 2025-06-09

## 2025-06-09 RX ORDER — MONTELUKAST SODIUM 10 MG/1
10 TABLET ORAL NIGHTLY
Qty: 90 TABLET | Refills: 3 | Status: SHIPPED | OUTPATIENT
Start: 2025-06-09

## 2025-06-09 RX ORDER — AMLODIPINE BESYLATE 5 MG/1
TABLET ORAL
Qty: 90 TABLET | Refills: 1 | Status: SHIPPED | OUTPATIENT
Start: 2025-06-09

## 2025-06-09 RX ORDER — ESOMEPRAZOLE MAGNESIUM 40 MG/1
40 CAPSULE, DELAYED RELEASE ORAL
Qty: 90 CAPSULE | Refills: 3 | Status: SHIPPED | OUTPATIENT
Start: 2025-06-09

## 2025-06-09 RX ORDER — ATORVASTATIN CALCIUM 40 MG/1
TABLET, FILM COATED ORAL
Qty: 90 TABLET | Refills: 1 | Status: SHIPPED | OUTPATIENT
Start: 2025-06-09

## 2025-06-09 SDOH — ECONOMIC STABILITY: FOOD INSECURITY: WITHIN THE PAST 12 MONTHS, YOU WORRIED THAT YOUR FOOD WOULD RUN OUT BEFORE YOU GOT MONEY TO BUY MORE.: NEVER TRUE

## 2025-06-09 SDOH — ECONOMIC STABILITY: FOOD INSECURITY: WITHIN THE PAST 12 MONTHS, THE FOOD YOU BOUGHT JUST DIDN'T LAST AND YOU DIDN'T HAVE MONEY TO GET MORE.: NEVER TRUE

## 2025-06-09 ASSESSMENT — PATIENT HEALTH QUESTIONNAIRE - PHQ9
3. TROUBLE FALLING OR STAYING ASLEEP: NOT AT ALL
2. FEELING DOWN, DEPRESSED OR HOPELESS: SEVERAL DAYS
9. THOUGHTS THAT YOU WOULD BE BETTER OFF DEAD, OR OF HURTING YOURSELF: NOT AT ALL
10. IF YOU CHECKED OFF ANY PROBLEMS, HOW DIFFICULT HAVE THESE PROBLEMS MADE IT FOR YOU TO DO YOUR WORK, TAKE CARE OF THINGS AT HOME, OR GET ALONG WITH OTHER PEOPLE: NOT DIFFICULT AT ALL
SUM OF ALL RESPONSES TO PHQ QUESTIONS 1-9: 2
7. TROUBLE CONCENTRATING ON THINGS, SUCH AS READING THE NEWSPAPER OR WATCHING TELEVISION: NOT AT ALL
SUM OF ALL RESPONSES TO PHQ QUESTIONS 1-9: 2
4. FEELING TIRED OR HAVING LITTLE ENERGY: NOT AT ALL
SUM OF ALL RESPONSES TO PHQ QUESTIONS 1-9: 2
SUM OF ALL RESPONSES TO PHQ QUESTIONS 1-9: 2
8. MOVING OR SPEAKING SO SLOWLY THAT OTHER PEOPLE COULD HAVE NOTICED. OR THE OPPOSITE, BEING SO FIGETY OR RESTLESS THAT YOU HAVE BEEN MOVING AROUND A LOT MORE THAN USUAL: NOT AT ALL
6. FEELING BAD ABOUT YOURSELF - OR THAT YOU ARE A FAILURE OR HAVE LET YOURSELF OR YOUR FAMILY DOWN: NOT AT ALL
5. POOR APPETITE OR OVEREATING: NOT AT ALL
1. LITTLE INTEREST OR PLEASURE IN DOING THINGS: SEVERAL DAYS

## 2025-06-09 ASSESSMENT — ENCOUNTER SYMPTOMS: RESPIRATORY NEGATIVE: 1

## 2025-06-09 NOTE — PROGRESS NOTES
(SINGULAIR) 10 MG tablet          :    Tobin Carbone: Discuss Labs (Patient is here today as a follow up to discuss lab results. Patient had labs done on 05/28 to get his thyroid checked. ) and Follow-up  Update on success of fluoxetine in 4-6wks; f/u if not feeling improvement  Lab today  Further f/u likely in 6mo.  Diagnosis and orders for this visit are above.      Please note that this chart was generated using dragon dictationsoftware.  Although every effort was made to ensure the accuracy of this automated transcription, some errors in transcription may have occurred.    The patient (or guardian, if applicable) and other individuals in attendance with the patient were advised that Artificial Intelligence will be utilized during this visit to record, process the conversation to generate a clinical note, and support improvement of the AI technology. The patient (or guardian, if applicable) and other individuals in attendance at the appointment consented to the use of AI, including the recording.      --DENISHA Ashton on 6/9/2025 at 9:53 AM    An electronic signature was used to authenticate this note.

## 2025-06-14 ENCOUNTER — RESULTS FOLLOW-UP (OUTPATIENT)
Age: 50
End: 2025-06-14

## 2025-06-29 DIAGNOSIS — J45.30 MILD PERSISTENT ASTHMA WITHOUT COMPLICATION: ICD-10-CM

## 2025-06-30 RX ORDER — MONTELUKAST SODIUM 10 MG/1
10 TABLET ORAL NIGHTLY
Qty: 90 TABLET | Refills: 1 | Status: SHIPPED | OUTPATIENT
Start: 2025-06-30

## 2025-06-30 RX ORDER — FLUOXETINE 10 MG/1
10 CAPSULE ORAL DAILY
Qty: 90 CAPSULE | Refills: 1 | Status: SHIPPED | OUTPATIENT
Start: 2025-06-30 | End: 2025-07-02 | Stop reason: DRUGHIGH

## 2025-06-30 NOTE — TELEPHONE ENCOUNTER
Tobin J Emerson called to request a refill on his medication.      Last office visit : 6/9/2025  Next office visit : Visit date not found     Requested Prescriptions     Pending Prescriptions Disp Refills    FLUoxetine (PROZAC) 10 MG capsule [Pharmacy Med Name: FLUOXETIN(P) CAP 10MG] 90 capsule 1     Sig: TAKE 1 CAPSULE DAILY    montelukast (SINGULAIR) 10 MG tablet [Pharmacy Med Name: MONTELUKAST  TAB 10MG] 90 tablet 1     Sig: TAKE 1 TABLET NIGHTLY            Petrona Bonilla LPN

## 2025-07-02 RX ORDER — FLUOXETINE 10 MG/1
10 CAPSULE ORAL DAILY
Refills: 1 | OUTPATIENT
Start: 2025-07-02

## 2025-07-02 NOTE — TELEPHONE ENCOUNTER
Spoke to patient for clarification, he is no longer on the Prozac 10mg   Refused the request for the prozac 10mg and removed this from the patient's medication list

## 2025-08-08 DIAGNOSIS — R39.12 BENIGN PROSTATIC HYPERPLASIA WITH WEAK URINARY STREAM: ICD-10-CM

## 2025-08-08 DIAGNOSIS — N40.1 BENIGN PROSTATIC HYPERPLASIA WITH WEAK URINARY STREAM: ICD-10-CM

## 2025-08-08 RX ORDER — TAMSULOSIN HYDROCHLORIDE 0.4 MG/1
0.4 CAPSULE ORAL DAILY
Qty: 90 CAPSULE | Refills: 3 | Status: SHIPPED | OUTPATIENT
Start: 2025-08-08

## (undated) DEVICE — 3M™ IOBAN™ 2 ANTIMICROBIAL INCISE DRAPE 6650EZ: Brand: IOBAN™ 2

## (undated) DEVICE — THE CHANNEL CLEANING BRUSH IS A NYLON FLEXI BRUSH ATTACHED TO A FLEXIBLE PLASTIC SHEATH DESIGNED TO SAFELY REMOVE DEBRIS FROM FLEXIBLE ENDOSCOPES.

## (undated) DEVICE — SUPER TURBOVAC 90 WITH INTEGRATED FINGER SWITCHES IFS: Brand: COBLATION

## (undated) DEVICE — SYRINGE MED 50ML LUERLOCK TIP

## (undated) DEVICE — BANDAGE,GAUZE,CONFORMING,4"X75",STRL,LF: Brand: MEDLINE

## (undated) DEVICE — [AGGRESSIVE PLUS CUTTER, ARTHROSCOPIC SHAVER BLADE,  DO NOT RESTERILIZE,  DO NOT USE IF PACKAGE IS DAMAGED,  KEEP DRY,  KEEP AWAY FROM SUNLIGHT]: Brand: FORMULA

## (undated) DEVICE — BANDAGE GZ W2XL75IN ST RAYON POLY CNFRM STRTCH LTWT

## (undated) DEVICE — 3M™ STERI-STRIP™ REINFORCED ADHESIVE SKIN CLOSURES, R1547, 1/2 IN X 4 IN (12 MM X 100 MM), 6 STRIPS/ENVELOPE: Brand: 3M™ STERI-STRIP™

## (undated) DEVICE — DRESSING TRNSPAR W5XL4.5IN FLM SHT SEMIPERMEABLE WIND

## (undated) DEVICE — SHOULDER CDS

## (undated) DEVICE — YANKAUER,BULB TIP WITH VENT: Brand: ARGYLE

## (undated) DEVICE — ENDOGATOR AUXILIARY WATER JET CONNECTOR: Brand: ENDOGATOR

## (undated) DEVICE — KIT INSTR W/ 2.4MM GUIDEPIN SUT PASS WIRE NO2 FIBERWIRE

## (undated) DEVICE — PAD,EYE,1-5/8X2 5/8,STERILE,LF,1/PK: Brand: MEDLINE

## (undated) DEVICE — SHEET,DRAPE,53X77,STERILE: Brand: MEDLINE

## (undated) DEVICE — SENSR O2 OXIMAX FNGR A/ 18IN NONSTR

## (undated) DEVICE — SHOULDER STABILIZATION KIT,                                    DISPOSABLE 12 PER BOX

## (undated) DEVICE — SNAR POLYP CAPTIVATOR MICROHEX 13 240CM

## (undated) DEVICE — INTEGRATED CASSETTE TUBING, DO NOT USE IF PACKAGE IS DAMAGED: Brand: CROSSFLOW

## (undated) DEVICE — T-MAX DISPOSABLE FACE MASK 8 PER BOX

## (undated) DEVICE — MASK,OXYGEN,MED CONC,ADLT,7' TUB, UC: Brand: PENDING

## (undated) DEVICE — CHLORAPREP 26ML ORANGE

## (undated) DEVICE — PAD,ARMBOARD,CONV,FOAM,2X8X20",12PR/CS: Brand: MEDLINE

## (undated) DEVICE — CUFF,BP,DISP,1 TUBE,ADULT,HP: Brand: MEDLINE

## (undated) DEVICE — UNDERGLOVE SURG SZ 8 FNGR THK0.21MIL GRN LTX BEAD CUF

## (undated) DEVICE — TBG SMPL FLTR LINE NASL 02/C02 A/ BX/100

## (undated) DEVICE — FRCP BX RADJAW4 NDL 2.8 240 STD OG

## (undated) DEVICE — THE SINGLE USE ETRAP – POLYP TRAP IS USED FOR SUCTION RETRIEVAL OF ENDOSCOPICALLY REMOVED POLYPS.: Brand: ETRAP

## (undated) DEVICE — CONMED SCOPE SAVER BITE BLOCK, 20X27 MM: Brand: SCOPE SAVER

## (undated) DEVICE — GLOVE SURG SZ 8 CRM LTX FREE POLYISOPRENE POLYMER BEAD ANTI

## (undated) DEVICE — SUTURE MCRYL SZ 3 0 L18IN ABSRB UD PS 2 3 8 CIR REV CUT NDL MCP497G

## (undated) DEVICE — DRAPE,SHOULDER,BEACH CHAIR,STERILE: Brand: MEDLINE

## (undated) DEVICE — Device: Brand: DEFENDO AIR/WATER/SUCTION AND BIOPSY VALVE

## (undated) DEVICE — SOLUTION IRRIG 3000ML 0.9% SOD CHL USP UROMATIC PLAS CONT